# Patient Record
Sex: FEMALE | Race: WHITE | Employment: FULL TIME | URBAN - NONMETROPOLITAN AREA
[De-identification: names, ages, dates, MRNs, and addresses within clinical notes are randomized per-mention and may not be internally consistent; named-entity substitution may affect disease eponyms.]

---

## 2017-01-03 RX ORDER — DAPAGLIFLOZIN 5 MG/1
TABLET, FILM COATED ORAL
Qty: 30 TABLET | Refills: 11 | Status: SHIPPED | OUTPATIENT
Start: 2017-01-03 | End: 2018-01-11 | Stop reason: SDUPTHER

## 2017-04-04 DIAGNOSIS — R39.15 URINARY URGENCY: ICD-10-CM

## 2017-04-04 DIAGNOSIS — E11.9 TYPE 2 DIABETES MELLITUS WITHOUT COMPLICATION, WITHOUT LONG-TERM CURRENT USE OF INSULIN (HCC): ICD-10-CM

## 2017-04-04 DIAGNOSIS — J41.1 BRONCHITIS, MUCOPURULENT RECURRENT (HCC): ICD-10-CM

## 2017-04-04 DIAGNOSIS — J30.9 ALLERGIC RHINITIS: ICD-10-CM

## 2017-04-04 DIAGNOSIS — E11.9 CONTROLLED TYPE 2 DIABETES MELLITUS WITHOUT COMPLICATION, WITHOUT LONG-TERM CURRENT USE OF INSULIN (HCC): ICD-10-CM

## 2017-04-04 DIAGNOSIS — E78.5 DYSLIPIDEMIA: ICD-10-CM

## 2017-04-04 DIAGNOSIS — F32.A ANXIETY AND DEPRESSION: ICD-10-CM

## 2017-04-04 DIAGNOSIS — F41.9 ANXIETY AND DEPRESSION: ICD-10-CM

## 2017-04-04 RX ORDER — VENLAFAXINE HYDROCHLORIDE 75 MG/1
75 CAPSULE, EXTENDED RELEASE ORAL DAILY
Qty: 30 CAPSULE | Refills: 5 | Status: SHIPPED | OUTPATIENT
Start: 2017-04-04 | End: 2017-06-02 | Stop reason: SDUPTHER

## 2017-04-04 RX ORDER — ATORVASTATIN CALCIUM 20 MG/1
20 TABLET, FILM COATED ORAL DAILY
Qty: 30 TABLET | Refills: 5 | Status: SHIPPED | OUTPATIENT
Start: 2017-04-04 | End: 2017-10-31 | Stop reason: SDUPTHER

## 2017-04-04 RX ORDER — BENZOYL PEROXIDE
KIT TOPICAL
Qty: 30 TABLET | Refills: 5 | Status: SHIPPED | OUTPATIENT
Start: 2017-04-04 | End: 2017-09-21 | Stop reason: SDUPTHER

## 2017-04-04 RX ORDER — LANCETS 30 GAUGE
EACH MISCELLANEOUS
Qty: 100 EACH | Refills: 3 | Status: SHIPPED | OUTPATIENT
Start: 2017-04-04 | End: 2021-05-07 | Stop reason: SDUPTHER

## 2017-04-04 RX ORDER — OXYBUTYNIN CHLORIDE 5 MG/1
5 TABLET ORAL 2 TIMES DAILY
Qty: 60 TABLET | Refills: 11 | Status: SHIPPED | OUTPATIENT
Start: 2017-04-04 | End: 2018-04-16 | Stop reason: SDUPTHER

## 2017-04-04 RX ORDER — FLUTICASONE PROPIONATE 50 MCG
1 SPRAY, SUSPENSION (ML) NASAL DAILY
Qty: 1 BOTTLE | Refills: 5 | Status: SHIPPED | OUTPATIENT
Start: 2017-04-04 | End: 2018-06-12

## 2017-04-04 RX ORDER — ALBUTEROL SULFATE 90 UG/1
2 AEROSOL, METERED RESPIRATORY (INHALATION) EVERY 6 HOURS PRN
Qty: 1 INHALER | Refills: 3 | Status: CANCELLED | OUTPATIENT
Start: 2017-04-04

## 2017-04-04 RX ORDER — LISINOPRIL 2.5 MG/1
2.5 TABLET ORAL DAILY
Qty: 30 TABLET | Refills: 5 | Status: SHIPPED | OUTPATIENT
Start: 2017-04-04 | End: 2017-10-31 | Stop reason: SDUPTHER

## 2017-05-03 DIAGNOSIS — F41.9 ANXIETY AND DEPRESSION: ICD-10-CM

## 2017-05-03 DIAGNOSIS — F32.A ANXIETY AND DEPRESSION: ICD-10-CM

## 2017-05-04 RX ORDER — HYDROXYZINE 50 MG/1
TABLET, FILM COATED ORAL
Qty: 60 TABLET | Refills: 0 | Status: SHIPPED | OUTPATIENT
Start: 2017-05-04 | End: 2017-06-01 | Stop reason: SDUPTHER

## 2017-05-08 DIAGNOSIS — E11.9 TYPE 2 DIABETES MELLITUS WITHOUT COMPLICATION (HCC): ICD-10-CM

## 2017-05-26 ENCOUNTER — HOSPITAL ENCOUNTER (OUTPATIENT)
Age: 32
Discharge: HOME | End: 2017-05-26
Payer: COMMERCIAL

## 2017-05-26 DIAGNOSIS — E78.5 DYSLIPIDEMIA: ICD-10-CM

## 2017-05-26 DIAGNOSIS — E11.9 CONTROLLED TYPE 2 DIABETES MELLITUS WITHOUT COMPLICATION, WITHOUT LONG-TERM CURRENT USE OF INSULIN (HCC): ICD-10-CM

## 2017-05-26 LAB
ALBUMIN SERPL-MCNC: 4.2 G/DL (ref 3.5–5.2)
ALBUMIN/GLOBULIN RATIO: 1.3 (ref 1–2.5)
ALP BLD-CCNC: 89 U/L (ref 35–104)
ALT SERPL-CCNC: 24 U/L (ref 5–33)
ANION GAP SERPL CALCULATED.3IONS-SCNC: 14 MMOL/L (ref 9–17)
AST SERPL-CCNC: 13 U/L
BILIRUB SERPL-MCNC: 0.35 MG/DL (ref 0.3–1.2)
BUN BLDV-MCNC: 14 MG/DL (ref 6–20)
BUN/CREAT BLD: 26 (ref 9–20)
CALCIUM SERPL-MCNC: 9.3 MG/DL (ref 8.6–10.4)
CHLORIDE BLD-SCNC: 100 MMOL/L (ref 98–107)
CHOLESTEROL/HDL RATIO: 3.4
CHOLESTEROL: 144 MG/DL
CO2: 22 MMOL/L (ref 20–31)
CREAT SERPL-MCNC: 0.54 MG/DL (ref 0.5–0.9)
CREATININE URINE: 45.7 MG/DL (ref 28–217)
ESTIMATED AVERAGE GLUCOSE: 148 MG/DL
GFR AFRICAN AMERICAN: >60 ML/MIN
GFR NON-AFRICAN AMERICAN: >60 ML/MIN
GFR SERPL CREATININE-BSD FRML MDRD: ABNORMAL ML/MIN/{1.73_M2}
GFR SERPL CREATININE-BSD FRML MDRD: ABNORMAL ML/MIN/{1.73_M2}
GLUCOSE BLD-MCNC: 150 MG/DL (ref 70–99)
HBA1C MFR BLD: 6.8 % (ref 4.8–5.9)
HDLC SERPL-MCNC: 42 MG/DL
LDL CHOLESTEROL: 75 MG/DL (ref 0–130)
MICROALBUMIN/CREAT 24H UR: <12 MG/L
MICROALBUMIN/CREAT UR-RTO: 26 MCG/MG CREAT
POTASSIUM SERPL-SCNC: 4.5 MMOL/L (ref 3.7–5.3)
SODIUM BLD-SCNC: 136 MMOL/L (ref 135–144)
TOTAL PROTEIN: 7.4 G/DL (ref 6.4–8.3)
TRIGL SERPL-MCNC: 133 MG/DL
VLDLC SERPL CALC-MCNC: NORMAL MG/DL (ref 1–30)

## 2017-05-26 PROCEDURE — 83036 HEMOGLOBIN GLYCOSYLATED A1C: CPT

## 2017-05-26 PROCEDURE — 36415 COLL VENOUS BLD VENIPUNCTURE: CPT

## 2017-05-26 PROCEDURE — 80053 COMPREHEN METABOLIC PANEL: CPT

## 2017-05-26 PROCEDURE — 82570 ASSAY OF URINE CREATININE: CPT

## 2017-05-26 PROCEDURE — 82043 UR ALBUMIN QUANTITATIVE: CPT

## 2017-05-26 PROCEDURE — 80061 LIPID PANEL: CPT

## 2017-06-01 DIAGNOSIS — F32.A ANXIETY AND DEPRESSION: ICD-10-CM

## 2017-06-01 DIAGNOSIS — F41.9 ANXIETY AND DEPRESSION: ICD-10-CM

## 2017-06-02 ENCOUNTER — OFFICE VISIT (OUTPATIENT)
Dept: PRIMARY CARE CLINIC | Age: 32
End: 2017-06-02
Payer: COMMERCIAL

## 2017-06-02 VITALS
RESPIRATION RATE: 18 BRPM | HEIGHT: 66 IN | DIASTOLIC BLOOD PRESSURE: 63 MMHG | SYSTOLIC BLOOD PRESSURE: 98 MMHG | BODY MASS INDEX: 37.96 KG/M2 | WEIGHT: 236.2 LBS | HEART RATE: 69 BPM | TEMPERATURE: 97 F

## 2017-06-02 DIAGNOSIS — F41.9 ANXIETY AND DEPRESSION: ICD-10-CM

## 2017-06-02 DIAGNOSIS — Z23 NEED FOR PNEUMOCOCCAL VACCINATION: ICD-10-CM

## 2017-06-02 DIAGNOSIS — F32.A ANXIETY AND DEPRESSION: ICD-10-CM

## 2017-06-02 DIAGNOSIS — E78.5 DYSLIPIDEMIA: ICD-10-CM

## 2017-06-02 DIAGNOSIS — E11.9 CONTROLLED TYPE 2 DIABETES MELLITUS WITHOUT COMPLICATION, WITHOUT LONG-TERM CURRENT USE OF INSULIN (HCC): Primary | ICD-10-CM

## 2017-06-02 PROCEDURE — 90471 IMMUNIZATION ADMIN: CPT | Performed by: NURSE PRACTITIONER

## 2017-06-02 PROCEDURE — 90732 PPSV23 VACC 2 YRS+ SUBQ/IM: CPT | Performed by: NURSE PRACTITIONER

## 2017-06-02 PROCEDURE — 99214 OFFICE O/P EST MOD 30 MIN: CPT | Performed by: NURSE PRACTITIONER

## 2017-06-02 RX ORDER — HYDROXYZINE 50 MG/1
TABLET, FILM COATED ORAL
Qty: 30 TABLET | Refills: 1 | Status: SHIPPED | OUTPATIENT
Start: 2017-06-02 | End: 2017-07-17 | Stop reason: SDUPTHER

## 2017-06-02 RX ORDER — VENLAFAXINE HYDROCHLORIDE 150 MG/1
150 CAPSULE, EXTENDED RELEASE ORAL DAILY
Qty: 90 CAPSULE | Refills: 1 | Status: SHIPPED | OUTPATIENT
Start: 2017-06-02 | End: 2017-11-29 | Stop reason: SDUPTHER

## 2017-06-02 ASSESSMENT — ENCOUNTER SYMPTOMS
BACK PAIN: 0
SORE THROAT: 0
ABDOMINAL PAIN: 0
DIARRHEA: 0
VOMITING: 0
RHINORRHEA: 0
NAUSEA: 0
SHORTNESS OF BREATH: 0
COUGH: 0
WHEEZING: 0
CONSTIPATION: 0

## 2017-07-17 DIAGNOSIS — E11.9 TYPE 2 DIABETES MELLITUS WITHOUT COMPLICATION (HCC): ICD-10-CM

## 2017-07-17 DIAGNOSIS — F32.A ANXIETY AND DEPRESSION: ICD-10-CM

## 2017-07-17 DIAGNOSIS — F41.9 ANXIETY AND DEPRESSION: ICD-10-CM

## 2017-07-18 DIAGNOSIS — E11.9 TYPE 2 DIABETES MELLITUS WITHOUT COMPLICATION (HCC): ICD-10-CM

## 2017-07-18 DIAGNOSIS — E11.9 TYPE 2 DIABETES MELLITUS WITHOUT COMPLICATION, WITHOUT LONG-TERM CURRENT USE OF INSULIN (HCC): ICD-10-CM

## 2017-07-18 DIAGNOSIS — E78.5 DYSLIPIDEMIA: ICD-10-CM

## 2017-07-18 DIAGNOSIS — J30.9 ALLERGIC RHINITIS: ICD-10-CM

## 2017-07-18 DIAGNOSIS — E11.9 CONTROLLED TYPE 2 DIABETES MELLITUS WITHOUT COMPLICATION, WITHOUT LONG-TERM CURRENT USE OF INSULIN (HCC): ICD-10-CM

## 2017-07-18 DIAGNOSIS — J41.1 BRONCHITIS, MUCOPURULENT RECURRENT (HCC): ICD-10-CM

## 2017-07-18 DIAGNOSIS — F41.9 ANXIETY AND DEPRESSION: ICD-10-CM

## 2017-07-18 DIAGNOSIS — F32.A ANXIETY AND DEPRESSION: ICD-10-CM

## 2017-07-18 RX ORDER — LISINOPRIL 2.5 MG/1
2.5 TABLET ORAL DAILY
Qty: 30 TABLET | Refills: 5 | Status: CANCELLED | OUTPATIENT
Start: 2017-07-18

## 2017-07-18 RX ORDER — LORATADINE 10 MG/1
TABLET ORAL
Qty: 30 TABLET | Refills: 5 | Status: CANCELLED | OUTPATIENT
Start: 2017-07-18

## 2017-07-18 RX ORDER — HYDROXYZINE 50 MG/1
TABLET, FILM COATED ORAL
Qty: 30 TABLET | Refills: 1 | Status: SHIPPED | OUTPATIENT
Start: 2017-07-18 | End: 2017-09-10 | Stop reason: SDUPTHER

## 2017-07-18 RX ORDER — ALBUTEROL SULFATE 90 UG/1
2 AEROSOL, METERED RESPIRATORY (INHALATION) EVERY 6 HOURS PRN
Qty: 1 INHALER | Refills: 3 | Status: SHIPPED | OUTPATIENT
Start: 2017-07-18 | End: 2017-10-31 | Stop reason: SDUPTHER

## 2017-07-18 RX ORDER — HYDROXYZINE 50 MG/1
TABLET, FILM COATED ORAL
Qty: 30 TABLET | Refills: 1 | Status: CANCELLED | OUTPATIENT
Start: 2017-07-18

## 2017-07-18 RX ORDER — FLUTICASONE PROPIONATE 50 MCG
1 SPRAY, SUSPENSION (ML) NASAL DAILY
Qty: 1 BOTTLE | Refills: 5 | Status: CANCELLED | OUTPATIENT
Start: 2017-07-18

## 2017-07-18 RX ORDER — BLOOD-GLUCOSE METER
1 KIT MISCELLANEOUS DAILY
Qty: 1 KIT | Refills: 0 | Status: SHIPPED | OUTPATIENT
Start: 2017-07-18 | End: 2018-08-10 | Stop reason: SDUPTHER

## 2017-07-18 RX ORDER — VENLAFAXINE HYDROCHLORIDE 150 MG/1
150 CAPSULE, EXTENDED RELEASE ORAL DAILY
Qty: 90 CAPSULE | Refills: 1 | Status: CANCELLED | OUTPATIENT
Start: 2017-07-18

## 2017-07-18 RX ORDER — ATORVASTATIN CALCIUM 20 MG/1
20 TABLET, FILM COATED ORAL DAILY
Qty: 30 TABLET | Refills: 5 | Status: CANCELLED | OUTPATIENT
Start: 2017-07-18

## 2017-07-18 RX ORDER — LINAGLIPTIN AND METFORMIN HYDROCHLORIDE 2.5; 1 MG/1; MG/1
TABLET, FILM COATED ORAL
Qty: 60 TABLET | Refills: 11 | Status: SHIPPED | OUTPATIENT
Start: 2017-07-18 | End: 2018-08-10 | Stop reason: SDUPTHER

## 2017-09-09 ENCOUNTER — APPOINTMENT (OUTPATIENT)
Dept: GENERAL RADIOLOGY | Age: 32
End: 2017-09-09
Payer: COMMERCIAL

## 2017-09-09 ENCOUNTER — HOSPITAL ENCOUNTER (EMERGENCY)
Age: 32
Discharge: HOME | End: 2017-09-09
Attending: EMERGENCY MEDICINE
Payer: COMMERCIAL

## 2017-09-09 VITALS
TEMPERATURE: 98.1 F | SYSTOLIC BLOOD PRESSURE: 121 MMHG | HEIGHT: 66 IN | WEIGHT: 220 LBS | BODY MASS INDEX: 35.36 KG/M2 | HEART RATE: 68 BPM | RESPIRATION RATE: 18 BRPM | OXYGEN SATURATION: 96 % | DIASTOLIC BLOOD PRESSURE: 85 MMHG

## 2017-09-09 DIAGNOSIS — S80.01XA CONTUSION OF RIGHT KNEE, INITIAL ENCOUNTER: Primary | ICD-10-CM

## 2017-09-09 PROCEDURE — 99283 EMERGENCY DEPT VISIT LOW MDM: CPT

## 2017-09-09 PROCEDURE — 73562 X-RAY EXAM OF KNEE 3: CPT

## 2017-09-09 ASSESSMENT — PAIN DESCRIPTION - PAIN TYPE: TYPE: ACUTE PAIN

## 2017-09-09 ASSESSMENT — PAIN SCALES - GENERAL
PAINLEVEL_OUTOF10: 9
PAINLEVEL_OUTOF10: 4

## 2017-09-10 DIAGNOSIS — F41.9 ANXIETY AND DEPRESSION: ICD-10-CM

## 2017-09-10 DIAGNOSIS — F32.A ANXIETY AND DEPRESSION: ICD-10-CM

## 2017-09-11 RX ORDER — HYDROXYZINE 50 MG/1
TABLET, FILM COATED ORAL
Qty: 180 TABLET | Refills: 1 | Status: SHIPPED | OUTPATIENT
Start: 2017-09-11 | End: 2018-04-15 | Stop reason: SDUPTHER

## 2017-09-21 DIAGNOSIS — J30.9 ALLERGIC RHINITIS: ICD-10-CM

## 2017-09-21 RX ORDER — BENZOYL PEROXIDE
KIT TOPICAL
Qty: 30 TABLET | Refills: 5 | Status: SHIPPED | OUTPATIENT
Start: 2017-09-21 | End: 2018-05-14 | Stop reason: SDUPTHER

## 2017-09-26 ENCOUNTER — PATIENT MESSAGE (OUTPATIENT)
Dept: PRIMARY CARE CLINIC | Age: 32
End: 2017-09-26

## 2017-09-26 DIAGNOSIS — N89.8 VAGINAL ITCHING: ICD-10-CM

## 2017-10-09 ENCOUNTER — PATIENT MESSAGE (OUTPATIENT)
Dept: PRIMARY CARE CLINIC | Age: 32
End: 2017-10-09

## 2017-10-09 NOTE — TELEPHONE ENCOUNTER
From: Najma Close  To:  Hemanth Johnson CNP  Sent: 10/9/2017 8:48 AM EDT  Subject: Non-Urgent Medical Question    My work needs a note saying I am diabetic and need to take a break to eat during my day

## 2017-10-31 ENCOUNTER — OFFICE VISIT (OUTPATIENT)
Dept: PRIMARY CARE CLINIC | Age: 32
End: 2017-10-31
Payer: COMMERCIAL

## 2017-10-31 VITALS
TEMPERATURE: 97.6 F | WEIGHT: 226.3 LBS | BODY MASS INDEX: 36.53 KG/M2 | RESPIRATION RATE: 16 BRPM | DIASTOLIC BLOOD PRESSURE: 89 MMHG | HEART RATE: 89 BPM | SYSTOLIC BLOOD PRESSURE: 113 MMHG

## 2017-10-31 DIAGNOSIS — J40 BRONCHITIS: Primary | ICD-10-CM

## 2017-10-31 DIAGNOSIS — E78.5 DYSLIPIDEMIA: ICD-10-CM

## 2017-10-31 DIAGNOSIS — J20.9 ACUTE BRONCHITIS WITH BRONCHOSPASM: ICD-10-CM

## 2017-10-31 DIAGNOSIS — E11.9 CONTROLLED TYPE 2 DIABETES MELLITUS WITHOUT COMPLICATION, WITHOUT LONG-TERM CURRENT USE OF INSULIN (HCC): ICD-10-CM

## 2017-10-31 PROCEDURE — G8484 FLU IMMUNIZE NO ADMIN: HCPCS | Performed by: NURSE PRACTITIONER

## 2017-10-31 PROCEDURE — 4004F PT TOBACCO SCREEN RCVD TLK: CPT | Performed by: NURSE PRACTITIONER

## 2017-10-31 PROCEDURE — G8427 DOCREV CUR MEDS BY ELIG CLIN: HCPCS | Performed by: NURSE PRACTITIONER

## 2017-10-31 PROCEDURE — G8417 CALC BMI ABV UP PARAM F/U: HCPCS | Performed by: NURSE PRACTITIONER

## 2017-10-31 PROCEDURE — 99213 OFFICE O/P EST LOW 20 MIN: CPT | Performed by: NURSE PRACTITIONER

## 2017-10-31 RX ORDER — AZITHROMYCIN 250 MG/1
TABLET, FILM COATED ORAL
Qty: 1 PACKET | Refills: 0 | Status: SHIPPED | OUTPATIENT
Start: 2017-10-31 | End: 2017-11-10

## 2017-10-31 RX ORDER — BENZONATATE 100 MG/1
100 CAPSULE ORAL 3 TIMES DAILY PRN
Qty: 21 CAPSULE | Refills: 0 | Status: SHIPPED | OUTPATIENT
Start: 2017-10-31 | End: 2017-11-07

## 2017-10-31 RX ORDER — ALBUTEROL SULFATE 90 UG/1
2 AEROSOL, METERED RESPIRATORY (INHALATION) EVERY 6 HOURS PRN
Qty: 1 INHALER | Refills: 3 | Status: SHIPPED | OUTPATIENT
Start: 2017-10-31 | End: 2019-05-14 | Stop reason: SDUPTHER

## 2017-10-31 ASSESSMENT — ENCOUNTER SYMPTOMS
WHEEZING: 1
GASTROINTESTINAL NEGATIVE: 1
SINUS PAIN: 1
COUGH: 1
EYES NEGATIVE: 1
RHINORRHEA: 1
CHEST TIGHTNESS: 1
SORE THROAT: 1
SHORTNESS OF BREATH: 1
SINUS PRESSURE: 1
TROUBLE SWALLOWING: 0
VOICE CHANGE: 1

## 2017-10-31 NOTE — PATIENT INSTRUCTIONS
Patient Education        Bronchitis: Care Instructions  Your Care Instructions    Bronchitis is inflammation of the bronchial tubes, which carry air to the lungs. The tubes swell and produce mucus, or phlegm. The mucus and inflamed bronchial tubes make you cough. You may have trouble breathing. Most cases of bronchitis are caused by viruses like those that cause colds. Antibiotics usually do not help and they may be harmful. Bronchitis usually develops rapidly and lasts about 2 to 3 weeks in otherwise healthy people. Follow-up care is a key part of your treatment and safety. Be sure to make and go to all appointments, and call your doctor if you are having problems. It's also a good idea to know your test results and keep a list of the medicines you take. How can you care for yourself at home? · Take all medicines exactly as prescribed. Call your doctor if you think you are having a problem with your medicine. · Get some extra rest.  · Take an over-the-counter pain medicine, such as acetaminophen (Tylenol), ibuprofen (Advil, Motrin), or naproxen (Aleve) to reduce fever and relieve body aches. Read and follow all instructions on the label. · Do not take two or more pain medicines at the same time unless the doctor told you to. Many pain medicines have acetaminophen, which is Tylenol. Too much acetaminophen (Tylenol) can be harmful. · Take an over-the-counter cough medicine that contains dextromethorphan to help quiet a dry, hacking cough so that you can sleep. Avoid cough medicines that have more than one active ingredient. Read and follow all instructions on the label. · Breathe moist air from a humidifier, hot shower, or sink filled with hot water. The heat and moisture will thin mucus so you can cough it out. · Do not smoke. Smoking can make bronchitis worse. If you need help quitting, talk to your doctor about stop-smoking programs and medicines.  These can increase your chances of quitting for good.  When should you call for help? Call 911 anytime you think you may need emergency care. For example, call if:  · You have severe trouble breathing. Call your doctor now or seek immediate medical care if:  · You have new or worse trouble breathing. · You cough up dark brown or bloody mucus (sputum). · You have a new or higher fever. · You have a new rash. Watch closely for changes in your health, and be sure to contact your doctor if:  · You cough more deeply or more often, especially if you notice more mucus or a change in the color of your mucus. · You are not getting better as expected. Where can you learn more? Go to https://Revaluate.[employer]. org and sign in to your [employer] account. Enter H333 in the [employer] box to learn more about \"Bronchitis: Care Instructions. \"     If you do not have an account, please click on the \"Sign Up Now\" link. Current as of: March 25, 2017  Content Version: 11.3  © 8888-4623 [employer]. Care instructions adapted under license by [facility] ([facility]). If you have questions about a medical condition or this instruction, always ask your healthcare professional. [place] [address] any warranty or liability for your use of this information.

## 2017-10-31 NOTE — LETTER
[address] [address]  Phone: [phone]  Fax: [phone] Renate Guzman NP        October 31, 2017     Patient: Srinivasan Real   YOB: 1985   Date of Visit: 10/31/2017       To Whom it May Concern:    Elliott Tapia was seen in my clinic on 10/31/2017. She may return to work on 11/1/2017. If you have any questions or concerns, please don't hesitate to call.     Sincerely,         Ashley Gimenez NP

## 2017-10-31 NOTE — PROGRESS NOTES
[facility] PHYSICIANS  Formerly [facility]  [address]  [place] [address]  Dept: [phone]  Dept Fax: [phone]    Alma Okeefe is a 29 y.o. female who presents to the [facility] in Care today for her medical conditions/complaints as noted below. Alma Okeefe is c/o of Cough (started a week ago.  ) and Congestion (sinus)      HPI:     Cough   This is a new problem. The current episode started 1 to 4 weeks ago (over a week, worse over past 3-4 days ). The problem has been gradually worsening. Episode frequency: frequent  Cough characteristics: dry and productive yellow/green  Associated symptoms include chills, a fever, headaches, nasal congestion, rhinorrhea, a sore throat, shortness of breath and wheezing. Pertinent negatives include no ear congestion, ear pain or rash. Nothing aggravates the symptoms. Risk factors for lung disease include smoking/tobacco exposure. She has tried OTC cough suppressant and steroid inhaler for the symptoms. The treatment provided no relief. Her past medical history is significant for asthma.        Past Medical History:   Diagnosis Date    Depression     Female stress incontinence 11/23/2015    Obesity     Type II or unspecified type diabetes mellitus without mention of complication, not stated as uncontrolled     Wears glasses         Current Outpatient Prescriptions   Medication Sig Dispense Refill    EQ ALLERGY RELIEF 10 MG tablet TAKE ONE TABLET BY MOUTH ONCE DAILY 30 tablet 5    hydrOXYzine (ATARAX) 50 MG tablet TAKE ONE TABLET BY MOUTH TWICE DAILY AS NEEDED FOR ANXIETY 180 tablet 1    JENTADUETO 2.5-1000 MG TABS TAKE ONE TABLET BY MOUTH TWICE DAILY 60 tablet 11    albuterol sulfate HFA (VENTOLIN HFA) 108 (90 Base) MCG/ACT inhaler Inhale 2 puffs into the lungs every 6 hours as needed for Wheezing 1 Inhaler 3    glucose monitoring kit (FREESTYLE) monitoring kit 1 kit by Does not apply route daily Substitute as needed for Right Ear: Tympanic membrane, external ear and ear canal normal.   Left Ear: Tympanic membrane, external ear and ear canal normal.   Nose: Mucosal edema and rhinorrhea present. Right sinus exhibits no maxillary sinus tenderness and no frontal sinus tenderness. Left sinus exhibits no maxillary sinus tenderness and no frontal sinus tenderness. Mouth/Throat: Uvula is midline, oropharynx is clear and moist and mucous membranes are normal. No trismus in the jaw. No uvula swelling. No oropharyngeal exudate, posterior oropharyngeal edema, posterior oropharyngeal erythema or tonsillar abscesses. Eyes: Conjunctivae and EOM are normal. Pupils are equal, round, and reactive to light. Right eye exhibits no discharge. Left eye exhibits no discharge. Neck: Normal range of motion. Neck supple. No tracheal deviation present. No thyromegaly present. Cardiovascular: Normal rate, regular rhythm, normal heart sounds and intact distal pulses. Exam reveals no gallop and no friction rub. No murmur heard. Pulses:       Radial pulses are 2+ on the left side. Pulmonary/Chest: Effort normal. No accessory muscle usage. No tachypnea. No respiratory distress. She has no decreased breath sounds. She has wheezes. She has no rhonchi. She has no rales. Dry cough in office   Breath sounds with scattered wheezes to auscultation over posterior bilateral fields. Chest expansion is symmetrical with non-restricted air movement. Musculoskeletal: Normal range of motion. She exhibits no edema or tenderness. Lymphadenopathy:     She has no cervical adenopathy. Neurological: She is alert and oriented to person, place, and time. No cranial nerve deficit. She exhibits normal muscle tone. Coordination and gait normal.   Skin: Skin is warm and dry. No rash noted. She is not diaphoretic. No erythema. Psychiatric: She has a normal mood and affect.  Her speech is normal and behavior is normal. Judgment and thought content normal.

## 2017-11-01 RX ORDER — LISINOPRIL 2.5 MG/1
TABLET ORAL
Qty: 90 TABLET | Refills: 1 | Status: SHIPPED | OUTPATIENT
Start: 2017-11-01 | End: 2018-05-14 | Stop reason: SDUPTHER

## 2017-11-01 RX ORDER — ATORVASTATIN CALCIUM 20 MG/1
TABLET, FILM COATED ORAL
Qty: 90 TABLET | Refills: 1 | Status: SHIPPED | OUTPATIENT
Start: 2017-11-01 | End: 2018-08-07 | Stop reason: SDUPTHER

## 2017-11-01 NOTE — TELEPHONE ENCOUNTER
Health Maintenance   Topic Date Due    Diabetic retinal exam  04/29/2017    Diabetic foot exam  08/03/2017    Flu vaccine (1) 09/01/2017    DTaP/Tdap/Td vaccine (1 - Tdap) 11/03/2017 (Originally 9/2/2006)    Diabetic hemoglobin A1C test  05/26/2018    Diabetic microalbuminuria test  05/26/2018    Lipid screen  05/26/2018    Cervical cancer screen  08/19/2018    Pneumococcal med risk  Completed    HIV screen  Completed             (applicable per patient's age: Cancer Screenings, Depression Screening, Fall Risk Screening, Immunizations)    Hemoglobin A1C (%)   Date Value   05/26/2017 6.8 (H)   10/28/2016 6.2 (H)   07/29/2016 6.1 (H)     Microalb/Crt.  Ratio (mcg/mg creat)   Date Value   05/26/2017 26 (H)     LDL Cholesterol (mg/dL)   Date Value   05/26/2017 75     AST (U/L)   Date Value   05/26/2017 13     ALT (U/L)   Date Value   05/26/2017 24     BUN (mg/dL)   Date Value   05/26/2017 14      (goal A1C is < 7)   (goal LDL is <100) need 30-50% reduction from baseline     BP Readings from Last 3 Encounters:   10/31/17 113/89   09/09/17 121/85   06/02/17 98/63    (goal /80)      All Future Testing planned in [facility]:  Lab Frequency Next Occurrence   Comprehensive Metabolic Panel Once 31/43/4898   Hemoglobin A1C Once 11/29/2017   Microalbumin, Ur Once 11/29/2017   CBC Auto Differential Once 11/29/2017       Next Visit Date:  Future Appointments  Date Time Provider Rohini Cabral   12/11/2017 9:30 AM [facility] Might, CNP [facility]            Patient Active Problem List:     Dyslipidemia     Insomnia     Anxiety and depression     Morbidly obese (HCC) BMI 38.1     Adopted     Bronchitis, mucopurulent recurrent (HCC)     Urinary urgency     Female stress incontinence     Controlled type 2 diabetes mellitus without complication, without long-term current use of insulin ([address]

## 2017-11-29 DIAGNOSIS — F32.A ANXIETY AND DEPRESSION: ICD-10-CM

## 2017-11-29 DIAGNOSIS — F41.9 ANXIETY AND DEPRESSION: ICD-10-CM

## 2017-11-29 RX ORDER — VENLAFAXINE HYDROCHLORIDE 150 MG/1
CAPSULE, EXTENDED RELEASE ORAL
Qty: 90 CAPSULE | Refills: 1 | Status: SHIPPED | OUTPATIENT
Start: 2017-11-29 | End: 2018-08-07 | Stop reason: SDUPTHER

## 2018-01-12 RX ORDER — DAPAGLIFLOZIN 5 MG/1
TABLET, FILM COATED ORAL
Qty: 90 TABLET | Refills: 3 | Status: SHIPPED | OUTPATIENT
Start: 2018-01-12 | End: 2019-05-14

## 2018-01-12 NOTE — TELEPHONE ENCOUNTER
Health Maintenance   Topic Date Due    DTaP/Tdap/Td vaccine (1 - Tdap) 09/02/2006    Diabetic retinal exam  04/29/2017    Diabetic foot exam  08/03/2017    Flu vaccine (1) 09/01/2017    A1C test (Diabetic or Prediabetic)  05/26/2018    Diabetic microalbuminuria test  05/26/2018    Lipid screen  05/26/2018    Potassium monitoring  05/26/2018    Creatinine monitoring  05/26/2018    Cervical cancer screen  08/19/2018    Pneumococcal med risk  Completed    HIV screen  Completed             (applicable per patient's age: Cancer Screenings, Depression Screening, Fall Risk Screening, Immunizations)    Hemoglobin A1C (%)   Date Value   05/26/2017 6.8 (H)   10/28/2016 6.2 (H)   07/29/2016 6.1 (H)     Microalb/Crt. Ratio (mcg/mg creat)   Date Value   05/26/2017 26 (H)     LDL Cholesterol (mg/dL)   Date Value   05/26/2017 75     AST (U/L)   Date Value   05/26/2017 13     ALT (U/L)   Date Value   05/26/2017 24     BUN (mg/dL)   Date Value   05/26/2017 14      (goal A1C is < 7)   (goal LDL is <100) need 30-50% reduction from baseline     BP Readings from Last 3 Encounters:   10/31/17 113/89   09/09/17 121/85   06/02/17 98/63    (goal /80)      All Future Testing planned in [facility]:  Lab Frequency Next Occurrence   Comprehensive Metabolic Panel Once 73/26/3280   Hemoglobin A1C Once 01/19/2018   Microalbumin, Ur Once 01/19/2018   CBC Auto Differential Once 01/19/2018       Next Visit Date:  No future appointments.          Patient Active Problem List:     Dyslipidemia     Insomnia     Anxiety and depression     Morbidly obese (HCC) BMI 38.1     Adopted     Bronchitis, mucopurulent recurrent (HCC)     Urinary urgency     Female stress incontinence     Controlled type 2 diabetes mellitus without complication, without long-term current use of insulin ([facility] [address]

## 2018-03-08 ENCOUNTER — TELEPHONE (OUTPATIENT)
Dept: PRIMARY CARE CLINIC | Age: 33
End: 2018-03-08

## 2018-03-08 NOTE — TELEPHONE ENCOUNTER
Attempted to call patient and message left regarding need to have lab work done that was previously ordered and to R/S missed appt. Instructed to call the office.

## 2018-04-16 DIAGNOSIS — R39.15 URINARY URGENCY: ICD-10-CM

## 2018-04-16 RX ORDER — OXYBUTYNIN CHLORIDE 5 MG/1
5 TABLET ORAL 2 TIMES DAILY
Qty: 60 TABLET | Refills: 0 | Status: SHIPPED | OUTPATIENT
Start: 2018-04-16 | End: 2018-07-17 | Stop reason: ALTCHOICE

## 2018-05-07 ENCOUNTER — OFFICE VISIT (OUTPATIENT)
Dept: PRIMARY CARE CLINIC | Age: 33
End: 2018-05-07
Payer: COMMERCIAL

## 2018-05-07 VITALS
DIASTOLIC BLOOD PRESSURE: 73 MMHG | BODY MASS INDEX: 36.41 KG/M2 | WEIGHT: 225.6 LBS | TEMPERATURE: 96.8 F | RESPIRATION RATE: 16 BRPM | HEART RATE: 73 BPM | SYSTOLIC BLOOD PRESSURE: 102 MMHG

## 2018-05-07 DIAGNOSIS — K52.9 AGE (ACUTE GASTROENTERITIS): Primary | ICD-10-CM

## 2018-05-07 PROCEDURE — 4004F PT TOBACCO SCREEN RCVD TLK: CPT | Performed by: NURSE PRACTITIONER

## 2018-05-07 PROCEDURE — G8427 DOCREV CUR MEDS BY ELIG CLIN: HCPCS | Performed by: NURSE PRACTITIONER

## 2018-05-07 PROCEDURE — 99213 OFFICE O/P EST LOW 20 MIN: CPT | Performed by: NURSE PRACTITIONER

## 2018-05-07 PROCEDURE — G8417 CALC BMI ABV UP PARAM F/U: HCPCS | Performed by: NURSE PRACTITIONER

## 2018-05-07 RX ORDER — PROMETHAZINE HYDROCHLORIDE 25 MG/1
25 TABLET ORAL EVERY 6 HOURS PRN
Qty: 12 TABLET | Refills: 0 | Status: SHIPPED | OUTPATIENT
Start: 2018-05-07 | End: 2018-08-09 | Stop reason: SDUPTHER

## 2018-05-07 ASSESSMENT — ENCOUNTER SYMPTOMS
RHINORRHEA: 0
SORE THROAT: 0
COUGH: 0
VOMITING: 1
SHORTNESS OF BREATH: 0
SINUS PAIN: 0
FACIAL SWELLING: 0
DIARRHEA: 0
BLOOD IN STOOL: 0
ABDOMINAL PAIN: 0
NAUSEA: 1
WHEEZING: 0
CONSTIPATION: 0

## 2018-05-14 DIAGNOSIS — F41.9 ANXIETY AND DEPRESSION: ICD-10-CM

## 2018-05-14 DIAGNOSIS — F32.A ANXIETY AND DEPRESSION: ICD-10-CM

## 2018-05-14 RX ORDER — HYDROXYZINE 50 MG/1
TABLET, FILM COATED ORAL
Qty: 60 TABLET | Refills: 0 | OUTPATIENT
Start: 2018-05-14

## 2018-05-15 RX ORDER — HYDROXYZINE 50 MG/1
TABLET, FILM COATED ORAL
Qty: 60 TABLET | Refills: 2 | Status: SHIPPED | OUTPATIENT
Start: 2018-05-15 | End: 2018-08-07 | Stop reason: SDUPTHER

## 2018-06-04 ENCOUNTER — TELEPHONE (OUTPATIENT)
Dept: PRIMARY CARE CLINIC | Age: 33
End: 2018-06-04

## 2018-06-04 DIAGNOSIS — E11.9 CONTROLLED TYPE 2 DIABETES MELLITUS WITHOUT COMPLICATION, WITHOUT LONG-TERM CURRENT USE OF INSULIN (HCC): Primary | ICD-10-CM

## 2018-06-06 ENCOUNTER — HOSPITAL ENCOUNTER (OUTPATIENT)
Age: 33
Discharge: HOME | End: 2018-06-06
Payer: COMMERCIAL

## 2018-06-06 DIAGNOSIS — R30.0 DYSURIA: ICD-10-CM

## 2018-06-06 DIAGNOSIS — R30.0 DYSURIA: Primary | ICD-10-CM

## 2018-06-06 DIAGNOSIS — E11.9 CONTROLLED TYPE 2 DIABETES MELLITUS WITHOUT COMPLICATION, WITHOUT LONG-TERM CURRENT USE OF INSULIN (HCC): ICD-10-CM

## 2018-06-06 LAB
-: ABNORMAL
ABSOLUTE EOS #: 0.35 K/UL (ref 0–0.44)
ABSOLUTE IMMATURE GRANULOCYTE: 0.08 K/UL (ref 0–0.3)
ABSOLUTE LYMPH #: 3.04 K/UL (ref 1.1–3.7)
ABSOLUTE MONO #: 0.64 K/UL (ref 0.1–1.2)
ALBUMIN SERPL-MCNC: 4.5 G/DL (ref 3.5–5.2)
ALBUMIN/GLOBULIN RATIO: 1.3 (ref 1–2.5)
ALP BLD-CCNC: 104 U/L (ref 35–104)
ALT SERPL-CCNC: 16 U/L (ref 5–33)
AMORPHOUS: ABNORMAL
ANION GAP SERPL CALCULATED.3IONS-SCNC: 12 MMOL/L (ref 9–17)
AST SERPL-CCNC: 9 U/L
BACTERIA: ABNORMAL
BASOPHILS # BLD: 1 % (ref 0–2)
BASOPHILS ABSOLUTE: 0.09 K/UL (ref 0–0.2)
BILIRUB SERPL-MCNC: 0.21 MG/DL (ref 0.3–1.2)
BILIRUBIN URINE: NEGATIVE
BUN BLDV-MCNC: 13 MG/DL (ref 6–20)
BUN/CREAT BLD: 27 (ref 9–20)
CALCIUM SERPL-MCNC: 9.6 MG/DL (ref 8.6–10.4)
CASTS UA: ABNORMAL /LPF
CHLORIDE BLD-SCNC: 98 MMOL/L (ref 98–107)
CO2: 25 MMOL/L (ref 20–31)
COLOR: YELLOW
COMMENT UA: ABNORMAL
CREAT SERPL-MCNC: 0.49 MG/DL (ref 0.5–0.9)
CREATININE URINE: 53.9 MG/DL (ref 28–217)
CRYSTALS, UA: ABNORMAL /HPF
DIFFERENTIAL TYPE: ABNORMAL
EOSINOPHILS RELATIVE PERCENT: 3 % (ref 1–4)
EPITHELIAL CELLS UA: ABNORMAL /HPF (ref 0–25)
ESTIMATED AVERAGE GLUCOSE: 197 MG/DL
GFR AFRICAN AMERICAN: >60 ML/MIN
GFR NON-AFRICAN AMERICAN: >60 ML/MIN
GFR SERPL CREATININE-BSD FRML MDRD: ABNORMAL ML/MIN/{1.73_M2}
GFR SERPL CREATININE-BSD FRML MDRD: ABNORMAL ML/MIN/{1.73_M2}
GLUCOSE BLD-MCNC: 206 MG/DL (ref 70–99)
GLUCOSE URINE: ABNORMAL
HBA1C MFR BLD: 8.5 % (ref 4.8–5.9)
HCT VFR BLD CALC: 43.6 % (ref 36.3–47.1)
HEMOGLOBIN: 14.5 G/DL (ref 11.9–15.1)
IMMATURE GRANULOCYTES: 1 %
KETONES, URINE: NEGATIVE
LEUKOCYTE ESTERASE, URINE: NEGATIVE
LYMPHOCYTES # BLD: 23 % (ref 24–43)
MCH RBC QN AUTO: 29.8 PG (ref 25.2–33.5)
MCHC RBC AUTO-ENTMCNC: 33.3 G/DL (ref 28.4–34.8)
MCV RBC AUTO: 89.5 FL (ref 82.6–102.9)
MICROALBUMIN/CREAT 24H UR: 60 MG/L
MICROALBUMIN/CREAT UR-RTO: 111 MCG/MG CREAT
MONOCYTES # BLD: 5 % (ref 3–12)
MUCUS: ABNORMAL
NITRITE, URINE: NEGATIVE
NRBC AUTOMATED: 0 PER 100 WBC
OTHER OBSERVATIONS UA: ABNORMAL
PDW BLD-RTO: 12.3 % (ref 11.8–14.4)
PH UA: 6.5 (ref 5–9)
PLATELET # BLD: 444 K/UL (ref 138–453)
PLATELET ESTIMATE: ABNORMAL
PMV BLD AUTO: 8.8 FL (ref 8.1–13.5)
POTASSIUM SERPL-SCNC: 4.6 MMOL/L (ref 3.7–5.3)
PROTEIN UA: NEGATIVE
RBC # BLD: 4.87 M/UL (ref 3.95–5.11)
RBC # BLD: ABNORMAL 10*6/UL
RBC UA: ABNORMAL /HPF (ref 0–2)
RENAL EPITHELIAL, UA: ABNORMAL /HPF
SEG NEUTROPHILS: 67 % (ref 36–65)
SEGMENTED NEUTROPHILS ABSOLUTE COUNT: 9.08 K/UL (ref 1.5–8.1)
SODIUM BLD-SCNC: 135 MMOL/L (ref 135–144)
SPECIFIC GRAVITY UA: 1.01 (ref 1.01–1.02)
TOTAL PROTEIN: 8.1 G/DL (ref 6.4–8.3)
TRICHOMONAS: ABNORMAL
TURBIDITY: CLEAR
URINE HGB: ABNORMAL
UROBILINOGEN, URINE: NORMAL
WBC # BLD: 13.3 K/UL (ref 3.5–11.3)
WBC # BLD: ABNORMAL 10*3/UL
WBC UA: ABNORMAL /HPF (ref 0–5)
YEAST: ABNORMAL

## 2018-06-06 PROCEDURE — 36415 COLL VENOUS BLD VENIPUNCTURE: CPT

## 2018-06-06 PROCEDURE — 85025 COMPLETE CBC W/AUTO DIFF WBC: CPT

## 2018-06-06 PROCEDURE — 82570 ASSAY OF URINE CREATININE: CPT

## 2018-06-06 PROCEDURE — 81001 URINALYSIS AUTO W/SCOPE: CPT

## 2018-06-06 PROCEDURE — 87077 CULTURE AEROBIC IDENTIFY: CPT

## 2018-06-06 PROCEDURE — 83036 HEMOGLOBIN GLYCOSYLATED A1C: CPT

## 2018-06-06 PROCEDURE — 80053 COMPREHEN METABOLIC PANEL: CPT

## 2018-06-06 PROCEDURE — 82043 UR ALBUMIN QUANTITATIVE: CPT

## 2018-06-06 PROCEDURE — 87186 SC STD MICRODIL/AGAR DIL: CPT

## 2018-06-06 PROCEDURE — 87086 URINE CULTURE/COLONY COUNT: CPT

## 2018-06-07 ENCOUNTER — TELEPHONE (OUTPATIENT)
Dept: PRIMARY CARE CLINIC | Age: 33
End: 2018-06-07

## 2018-06-07 LAB
CULTURE: ABNORMAL
CULTURE: ABNORMAL
Lab: ABNORMAL
Lab: ABNORMAL
ORGANISM: ABNORMAL
SPECIMEN DESCRIPTION: ABNORMAL
SPECIMEN DESCRIPTION: ABNORMAL
STATUS: ABNORMAL

## 2018-06-08 ENCOUNTER — TELEPHONE (OUTPATIENT)
Dept: PRIMARY CARE CLINIC | Age: 33
End: 2018-06-08

## 2018-06-08 DIAGNOSIS — N30.00 ACUTE CYSTITIS WITHOUT HEMATURIA: Primary | ICD-10-CM

## 2018-06-08 RX ORDER — CIPROFLOXACIN 250 MG/1
250 TABLET, FILM COATED ORAL 2 TIMES DAILY
Qty: 10 TABLET | Refills: 0 | Status: SHIPPED | OUTPATIENT
Start: 2018-06-08 | End: 2018-06-13

## 2018-06-12 ENCOUNTER — OFFICE VISIT (OUTPATIENT)
Dept: PRIMARY CARE CLINIC | Age: 33
End: 2018-06-12
Payer: COMMERCIAL

## 2018-06-12 VITALS
SYSTOLIC BLOOD PRESSURE: 116 MMHG | TEMPERATURE: 97.2 F | BODY MASS INDEX: 36.67 KG/M2 | HEART RATE: 82 BPM | WEIGHT: 227.2 LBS | RESPIRATION RATE: 16 BRPM | DIASTOLIC BLOOD PRESSURE: 68 MMHG

## 2018-06-12 DIAGNOSIS — E11.9 CONTROLLED TYPE 2 DIABETES MELLITUS WITHOUT COMPLICATION, WITHOUT LONG-TERM CURRENT USE OF INSULIN (HCC): Primary | ICD-10-CM

## 2018-06-12 DIAGNOSIS — E78.5 DYSLIPIDEMIA: ICD-10-CM

## 2018-06-12 DIAGNOSIS — J41.1 BRONCHITIS, MUCOPURULENT RECURRENT (HCC): ICD-10-CM

## 2018-06-12 PROCEDURE — G8417 CALC BMI ABV UP PARAM F/U: HCPCS | Performed by: NURSE PRACTITIONER

## 2018-06-12 PROCEDURE — 99214 OFFICE O/P EST MOD 30 MIN: CPT | Performed by: NURSE PRACTITIONER

## 2018-06-12 PROCEDURE — 3045F PR MOST RECENT HEMOGLOBIN A1C LEVEL 7.0-9.0%: CPT | Performed by: NURSE PRACTITIONER

## 2018-06-12 PROCEDURE — 3023F SPIROM DOC REV: CPT | Performed by: NURSE PRACTITIONER

## 2018-06-12 PROCEDURE — 4004F PT TOBACCO SCREEN RCVD TLK: CPT | Performed by: NURSE PRACTITIONER

## 2018-06-12 PROCEDURE — 2022F DILAT RTA XM EVC RTNOPTHY: CPT | Performed by: NURSE PRACTITIONER

## 2018-06-12 PROCEDURE — G8427 DOCREV CUR MEDS BY ELIG CLIN: HCPCS | Performed by: NURSE PRACTITIONER

## 2018-06-12 PROCEDURE — G8926 SPIRO NO PERF OR DOC: HCPCS | Performed by: NURSE PRACTITIONER

## 2018-06-12 ASSESSMENT — ENCOUNTER SYMPTOMS
VOMITING: 0
FREQUENT THROAT CLEARING: 0
CONSTIPATION: 0
DIARRHEA: 0
BLURRED VISION: 0
SORE THROAT: 0
NAUSEA: 0
CHEST TIGHTNESS: 0
WHEEZING: 0
HOARSE VOICE: 0
DIFFICULTY BREATHING: 0
SPUTUM PRODUCTION: 0
RHINORRHEA: 0
COUGH: 0
SHORTNESS OF BREATH: 0
HEMOPTYSIS: 0

## 2018-06-12 ASSESSMENT — PATIENT HEALTH QUESTIONNAIRE - PHQ9
SUM OF ALL RESPONSES TO PHQ QUESTIONS 1-9: 1
SUM OF ALL RESPONSES TO PHQ9 QUESTIONS 1 & 2: 1
1. LITTLE INTEREST OR PLEASURE IN DOING THINGS: 0
2. FEELING DOWN, DEPRESSED OR HOPELESS: 1

## 2018-06-12 ASSESSMENT — COPD QUESTIONNAIRES: COPD: 1

## 2018-06-14 ENCOUNTER — TELEPHONE (OUTPATIENT)
Dept: PRIMARY CARE CLINIC | Age: 33
End: 2018-06-14

## 2018-06-14 DIAGNOSIS — E11.9 CONTROLLED TYPE 2 DIABETES MELLITUS WITHOUT COMPLICATION, WITHOUT LONG-TERM CURRENT USE OF INSULIN (HCC): Primary | ICD-10-CM

## 2018-06-15 ENCOUNTER — TELEPHONE (OUTPATIENT)
Dept: PRIMARY CARE CLINIC | Age: 33
End: 2018-06-15

## 2018-07-04 DIAGNOSIS — E11.9 CONTROLLED TYPE 2 DIABETES MELLITUS WITHOUT COMPLICATION, WITHOUT LONG-TERM CURRENT USE OF INSULIN (HCC): ICD-10-CM

## 2018-07-05 RX ORDER — LISINOPRIL 2.5 MG/1
TABLET ORAL
Qty: 90 TABLET | Refills: 0 | Status: SHIPPED | OUTPATIENT
Start: 2018-07-05 | End: 2018-09-03 | Stop reason: SDUPTHER

## 2018-07-17 ENCOUNTER — HOSPITAL ENCOUNTER (OUTPATIENT)
Age: 33
Setting detail: SPECIMEN
Discharge: HOME | End: 2018-07-17
Payer: COMMERCIAL

## 2018-07-17 ENCOUNTER — OFFICE VISIT (OUTPATIENT)
Dept: UROLOGY | Age: 33
End: 2018-07-17
Payer: COMMERCIAL

## 2018-07-17 VITALS
BODY MASS INDEX: 36.48 KG/M2 | SYSTOLIC BLOOD PRESSURE: 110 MMHG | DIASTOLIC BLOOD PRESSURE: 80 MMHG | TEMPERATURE: 98 F | WEIGHT: 227 LBS | HEIGHT: 66 IN

## 2018-07-17 DIAGNOSIS — R35.1 NOCTURIA: ICD-10-CM

## 2018-07-17 DIAGNOSIS — N39.46 MIXED INCONTINENCE: ICD-10-CM

## 2018-07-17 DIAGNOSIS — R35.0 FREQUENCY OF URINATION: ICD-10-CM

## 2018-07-17 DIAGNOSIS — K59.00 CONSTIPATION, UNSPECIFIED CONSTIPATION TYPE: ICD-10-CM

## 2018-07-17 DIAGNOSIS — N39.46 MIXED INCONTINENCE: Primary | ICD-10-CM

## 2018-07-17 LAB
-: ABNORMAL
AMORPHOUS: ABNORMAL
BACTERIA: ABNORMAL
BILIRUBIN URINE: NEGATIVE
CASTS UA: ABNORMAL /LPF
COLOR: YELLOW
COMMENT UA: ABNORMAL
CRYSTALS, UA: ABNORMAL /HPF
EPITHELIAL CELLS UA: ABNORMAL /HPF (ref 0–25)
GLUCOSE URINE: ABNORMAL
KETONES, URINE: NEGATIVE
LEUKOCYTE ESTERASE, URINE: NEGATIVE
MUCUS: ABNORMAL
NITRITE, URINE: NEGATIVE
OTHER OBSERVATIONS UA: ABNORMAL
PH UA: 6 (ref 5–9)
PROTEIN UA: NEGATIVE
RBC UA: ABNORMAL /HPF (ref 0–2)
RENAL EPITHELIAL, UA: ABNORMAL /HPF
SPECIFIC GRAVITY UA: >1.03 (ref 1.01–1.02)
TRICHOMONAS: ABNORMAL
TURBIDITY: ABNORMAL
URINE HGB: ABNORMAL
UROBILINOGEN, URINE: NORMAL
WBC UA: ABNORMAL /HPF (ref 0–5)
YEAST: ABNORMAL

## 2018-07-17 PROCEDURE — G8417 CALC BMI ABV UP PARAM F/U: HCPCS | Performed by: NURSE PRACTITIONER

## 2018-07-17 PROCEDURE — 87086 URINE CULTURE/COLONY COUNT: CPT

## 2018-07-17 PROCEDURE — 99215 OFFICE O/P EST HI 40 MIN: CPT | Performed by: NURSE PRACTITIONER

## 2018-07-17 PROCEDURE — 51798 US URINE CAPACITY MEASURE: CPT | Performed by: NURSE PRACTITIONER

## 2018-07-17 PROCEDURE — 4004F PT TOBACCO SCREEN RCVD TLK: CPT | Performed by: NURSE PRACTITIONER

## 2018-07-17 PROCEDURE — G8427 DOCREV CUR MEDS BY ELIG CLIN: HCPCS | Performed by: NURSE PRACTITIONER

## 2018-07-17 PROCEDURE — 86403 PARTICLE AGGLUT ANTBDY SCRN: CPT

## 2018-07-17 PROCEDURE — 81001 URINALYSIS AUTO W/SCOPE: CPT

## 2018-07-17 RX ORDER — POLYETHYLENE GLYCOL 3350 17 G/17G
17 POWDER, FOR SOLUTION ORAL DAILY
Qty: 510 G | Refills: 11 | Status: SHIPPED | OUTPATIENT
Start: 2018-07-17 | End: 2018-08-16

## 2018-07-17 RX ORDER — OXYBUTYNIN CHLORIDE 10 MG/1
10 TABLET, EXTENDED RELEASE ORAL DAILY
Qty: 30 TABLET | Refills: 1 | Status: SHIPPED | OUTPATIENT
Start: 2018-07-17 | End: 2018-09-03 | Stop reason: SDUPTHER

## 2018-07-17 NOTE — PROGRESS NOTES
TABLET BY MOUTH ONCE DAILY 30 tablet 3    FARXIGA 5 MG tablet TAKE ONE TABLET BY MOUTH IN THE MORNING 90 tablet 3    venlafaxine (EFFEXOR XR) 150 MG extended release capsule TAKE ONE CAPSULE BY MOUTH ONCE DAILY 90 capsule 1    atorvastatin (LIPITOR) 20 MG tablet TAKE ONE TABLET BY MOUTH ONCE DAILY 90 tablet 1    albuterol sulfate HFA (VENTOLIN HFA) 108 (90 Base) MCG/ACT inhaler Inhale 2 puffs into the lungs every 6 hours as needed for Wheezing 1 Inhaler 3    JENTADUETO 2.5-1000 MG TABS TAKE ONE TABLET BY MOUTH TWICE DAILY 60 tablet 11    glucose monitoring kit (FREESTYLE) monitoring kit 1 kit by Does not apply route daily Substitute as needed for insurance requirements. 1 kit 0    glucose blood VI test strips (TRUE METRIX BLOOD GLUCOSE TEST) strip 1 each by In Vitro route daily As needed. 100 each 3    Lancets MISC Substitute as needed for insurance requirements. Dx 250.00, testing daily. 100 each 3    [DISCONTINUED] oxybutynin (DITROPAN) 5 MG tablet Take 1 tablet by mouth 2 times daily 60 tablet 0     No facility-administered encounter medications on file as of 7/17/2018. Patient has no known allergies. History   Smoking Status    Current Some Day Smoker    Packs/day: 0.25    Types: Cigarettes   Smokeless Tobacco    Never Used     Comment: 1-2 cigs a day     History   Alcohol Use    Yes     Comment: rarely       Vitals:    07/17/18 1354   BP: 110/80   Temp: 98 °F (36.7 °C)     PHYSICAL EXAM:  Constitutional: Patient resting comfortably, in no acute distress. Neuro: Alert and oriented to person place and time. Cranial nerves grossly intact. Psych: Mood and affect normal.  Skin: Warm, dry  HEENT: normocephalic, atraumatic  Lymphatics: No palpable lymphadenopathy  Lungs: Respiratory effort normal, unlabored  Cardiovascular:  Normal peripheral pulses  Abdomen: Soft, non-tender, non-distended with no organomegaly or palpable masses. : No CVA tenderness.  Bladder non-tender and not symptoms. She was instructed to start MiraLAX daily and was sent home with written instructions on how to take this medication. We will check a UA C&S today. We'll start her on oxybutynin extended release 10 mg daily. I did explain that the extended release formulations should have less side effects. We'll see her back in 6-8 weeks to reevaluate her urinary symptoms.

## 2018-07-18 LAB
CULTURE: ABNORMAL
Lab: ABNORMAL
SPECIMEN DESCRIPTION: ABNORMAL
STATUS: ABNORMAL

## 2018-07-19 ENCOUNTER — TELEPHONE (OUTPATIENT)
Dept: UROLOGY | Age: 33
End: 2018-07-19

## 2018-07-19 RX ORDER — CEPHALEXIN 500 MG/1
500 CAPSULE ORAL 3 TIMES DAILY
Qty: 21 CAPSULE | Refills: 0 | Status: SHIPPED | OUTPATIENT
Start: 2018-07-19 | End: 2018-07-26

## 2018-07-20 NOTE — TELEPHONE ENCOUNTER
UACS is positive for infection. I sent in keflex to treat this infection. Take this antibiotic until gone. Take this with food and eat yogurt once per day to prevent GI upset. If you develop nausea, vomiting, or fevers call the office or go to the ER. If your urinary symptoms do not improve once completing the antibiotics call our office.  Keep f/u as planned

## 2018-08-07 DIAGNOSIS — F41.9 ANXIETY AND DEPRESSION: ICD-10-CM

## 2018-08-07 DIAGNOSIS — N89.8 VAGINAL ITCHING: ICD-10-CM

## 2018-08-07 DIAGNOSIS — K52.9 AGE (ACUTE GASTROENTERITIS): ICD-10-CM

## 2018-08-07 DIAGNOSIS — F32.A ANXIETY AND DEPRESSION: ICD-10-CM

## 2018-08-07 DIAGNOSIS — E78.5 DYSLIPIDEMIA: ICD-10-CM

## 2018-08-08 RX ORDER — PROMETHAZINE HYDROCHLORIDE 25 MG/1
25 TABLET ORAL EVERY 6 HOURS PRN
Qty: 12 TABLET | Refills: 0 | OUTPATIENT
Start: 2018-08-08 | End: 2018-08-15

## 2018-08-08 RX ORDER — ATORVASTATIN CALCIUM 20 MG/1
TABLET, FILM COATED ORAL
Qty: 90 TABLET | Refills: 1 | Status: SHIPPED | OUTPATIENT
Start: 2018-08-08 | End: 2019-01-07 | Stop reason: SDUPTHER

## 2018-08-08 RX ORDER — HYDROXYZINE 50 MG/1
TABLET, FILM COATED ORAL
Qty: 60 TABLET | Refills: 2 | Status: SHIPPED | OUTPATIENT
Start: 2018-08-08 | End: 2018-11-07 | Stop reason: SDUPTHER

## 2018-08-08 RX ORDER — VENLAFAXINE HYDROCHLORIDE 150 MG/1
CAPSULE, EXTENDED RELEASE ORAL
Qty: 90 CAPSULE | Refills: 1 | Status: SHIPPED | OUTPATIENT
Start: 2018-08-08 | End: 2019-01-07 | Stop reason: SDUPTHER

## 2018-08-08 NOTE — TELEPHONE ENCOUNTER
Health Maintenance   Topic Date Due    Diabetic foot exam  08/03/2017    Lipid screen  05/26/2018    Cervical cancer screen  08/19/2018    DTaP/Tdap/Td vaccine (1 - Tdap) 06/12/2019 (Originally 9/2/2006)    Flu vaccine (1) 09/01/2018    Diabetic retinal exam  03/29/2019    A1C test (Diabetic or Prediabetic)  06/06/2019    Diabetic microalbuminuria test  06/06/2019    Potassium monitoring  06/06/2019    Creatinine monitoring  06/06/2019    Pneumococcal med risk  Completed    HIV screen  Completed             (applicable per patient's age: Cancer Screenings, Depression Screening, Fall Risk Screening, Immunizations)    Hemoglobin A1C (%)   Date Value   06/06/2018 8.5 (H)   05/26/2017 6.8 (H)   10/28/2016 6.2 (H)     Microalb/Crt.  Ratio (mcg/mg creat)   Date Value   06/06/2018 111 (H)     LDL Cholesterol (mg/dL)   Date Value   05/26/2017 75     AST (U/L)   Date Value   06/06/2018 9     ALT (U/L)   Date Value   06/06/2018 16     BUN (mg/dL)   Date Value   06/06/2018 13      (goal A1C is < 7)   (goal LDL is <100) need 30-50% reduction from baseline     BP Readings from Last 3 Encounters:   07/17/18 110/80   06/12/18 116/68   05/07/18 102/73    (goal /80)      All Future Testing planned in [facility]:  Lab Frequency Next Occurrence   Hemoglobin A1C Once 09/10/2018   Lipid Panel Once 09/10/2018       Next Visit Date:  Future Appointments  Date Time Provider Rohini Cabral   8/28/2018 1:30 PM NINA Shepherd CNP[facility]   9/11/2018 4:30 PM NINA Ewing CNP [facility]            Patient Active Problem List:     Dyslipidemia     Insomnia     Anxiety and depression     Morbidly obese (HCC) BMI 38.1     Adopted     Bronchitis, mucopurulent recurrent ([address]     Urinary urgency     Female stress incontinence     Controlled type 2 diabetes mellitus without complication, without long-term current use of insulin ([address]

## 2018-08-09 DIAGNOSIS — K52.9 AGE (ACUTE GASTROENTERITIS): ICD-10-CM

## 2018-08-09 DIAGNOSIS — N89.8 VAGINAL ITCHING: ICD-10-CM

## 2018-08-10 DIAGNOSIS — E11.9 TYPE 2 DIABETES MELLITUS WITHOUT COMPLICATION, WITHOUT LONG-TERM CURRENT USE OF INSULIN (HCC): ICD-10-CM

## 2018-08-10 DIAGNOSIS — E11.9 TYPE 2 DIABETES MELLITUS WITHOUT COMPLICATION (HCC): ICD-10-CM

## 2018-08-10 DIAGNOSIS — J30.1 SEASONAL ALLERGIC RHINITIS DUE TO POLLEN: ICD-10-CM

## 2018-08-10 RX ORDER — BLOOD-GLUCOSE METER
1 KIT MISCELLANEOUS DAILY
Qty: 1 KIT | Refills: 0 | Status: SHIPPED | OUTPATIENT
Start: 2018-08-10 | End: 2021-05-07 | Stop reason: SDUPTHER

## 2018-08-10 RX ORDER — LORATADINE 10 MG/1
TABLET ORAL
Qty: 90 TABLET | Refills: 3 | Status: SHIPPED | OUTPATIENT
Start: 2018-08-10 | End: 2019-05-22 | Stop reason: ALTCHOICE

## 2018-08-10 RX ORDER — PROMETHAZINE HYDROCHLORIDE 25 MG/1
25 TABLET ORAL EVERY 6 HOURS PRN
Qty: 12 TABLET | Refills: 0 | Status: SHIPPED | OUTPATIENT
Start: 2018-08-10 | End: 2018-08-17

## 2018-08-10 NOTE — TELEPHONE ENCOUNTER
From: Shahrma Shankar  Sent: 8/10/2018 11:02 AM EDT  Subject: Medication Renewal Request    Shahram Shankar would like a refill of the following medications:     Chris Overall 2.5-1000 MG TABS [Silvio Johnson, APRN - CNP]    Preferred pharmacy: [address], [id number] [place] [address]    Comment:

## 2018-08-10 NOTE — TELEPHONE ENCOUNTER
Health Maintenance   Topic Date Due    Diabetic foot exam  08/03/2017    Lipid screen  05/26/2018    Cervical cancer screen  08/19/2018    DTaP/Tdap/Td vaccine (1 - Tdap) 06/12/2019 (Originally 9/2/2006)    Flu vaccine (1) 09/01/2018    Diabetic retinal exam  03/29/2019    A1C test (Diabetic or Prediabetic)  06/06/2019    Diabetic microalbuminuria test  06/06/2019    Potassium monitoring  06/06/2019    Creatinine monitoring  06/06/2019    Pneumococcal med risk  Completed    HIV screen  Completed             (applicable per patient's age: Cancer Screenings, Depression Screening, Fall Risk Screening, Immunizations)    Hemoglobin A1C (%)   Date Value   06/06/2018 8.5 (H)   05/26/2017 6.8 (H)   10/28/2016 6.2 (H)     Microalb/Crt.  Ratio (mcg/mg creat)   Date Value   06/06/2018 111 (H)     LDL Cholesterol (mg/dL)   Date Value   05/26/2017 75     AST (U/L)   Date Value   06/06/2018 9     ALT (U/L)   Date Value   06/06/2018 16     BUN (mg/dL)   Date Value   06/06/2018 13      (goal A1C is < 7)   (goal LDL is <100) need 30-50% reduction from baseline     BP Readings from Last 3 Encounters:   07/17/18 110/80   06/12/18 116/68   05/07/18 102/73    (goal /80)      All Future Testing planned in [facility]:  Lab Frequency Next Occurrence   Hemoglobin A1C Once 09/10/2018   Lipid Panel Once 09/10/2018       Next Visit Date:  Future Appointments  Date Time Provider Rohini Cabral   8/28/2018 1:30 PM NINA John CNP[facility]   9/11/2018 4:30 PM NINA Gandara - CNP [facility]            Patient Active Problem List:     Dyslipidemia     Insomnia     Anxiety and depression     Morbidly obese (HCC) BMI 38.1     Adopted     Bronchitis, mucopurulent recurrent ([facility] [address]     Urinary urgency     Female stress incontinence     Controlled type 2 diabetes mellitus without complication, without long-term current use of insulin ([address]

## 2018-08-12 ASSESSMENT — ENCOUNTER SYMPTOMS
EYE REDNESS: 0
SHORTNESS OF BREATH: 0
WHEEZING: 0
COLOR CHANGE: 0
BACK PAIN: 0
VOMITING: 0
COUGH: 0
NAUSEA: 0
ABDOMINAL PAIN: 0
EYE PAIN: 0
CONSTIPATION: 1

## 2018-09-03 DIAGNOSIS — K52.9 AGE (ACUTE GASTROENTERITIS): ICD-10-CM

## 2018-09-03 DIAGNOSIS — E11.9 CONTROLLED TYPE 2 DIABETES MELLITUS WITHOUT COMPLICATION, WITHOUT LONG-TERM CURRENT USE OF INSULIN (HCC): ICD-10-CM

## 2018-09-04 RX ORDER — PROMETHAZINE HYDROCHLORIDE 25 MG/1
25 TABLET ORAL EVERY 6 HOURS PRN
Qty: 12 TABLET | Refills: 0 | OUTPATIENT
Start: 2018-09-04 | End: 2018-09-11

## 2018-09-04 RX ORDER — LISINOPRIL 2.5 MG/1
TABLET ORAL
Qty: 90 TABLET | Refills: 0 | Status: SHIPPED | OUTPATIENT
Start: 2018-09-04 | End: 2018-11-07 | Stop reason: SDUPTHER

## 2018-09-06 DIAGNOSIS — E11.9 CONTROLLED TYPE 2 DIABETES MELLITUS WITHOUT COMPLICATION, WITHOUT LONG-TERM CURRENT USE OF INSULIN (HCC): Primary | ICD-10-CM

## 2018-09-07 ENCOUNTER — TELEPHONE (OUTPATIENT)
Dept: PRIMARY CARE CLINIC | Age: 33
End: 2018-09-07

## 2018-09-18 ENCOUNTER — OFFICE VISIT (OUTPATIENT)
Dept: UROLOGY | Age: 33
End: 2018-09-18
Payer: COMMERCIAL

## 2018-09-18 ENCOUNTER — HOSPITAL ENCOUNTER (OUTPATIENT)
Age: 33
Setting detail: SPECIMEN
Discharge: HOME | End: 2018-09-18
Payer: COMMERCIAL

## 2018-09-18 VITALS — BODY MASS INDEX: 37.45 KG/M2 | SYSTOLIC BLOOD PRESSURE: 108 MMHG | DIASTOLIC BLOOD PRESSURE: 78 MMHG | WEIGHT: 232 LBS

## 2018-09-18 DIAGNOSIS — N39.46 MIXED INCONTINENCE: ICD-10-CM

## 2018-09-18 DIAGNOSIS — R35.0 FREQUENCY OF URINATION: ICD-10-CM

## 2018-09-18 DIAGNOSIS — R35.1 NOCTURIA: ICD-10-CM

## 2018-09-18 DIAGNOSIS — R35.1 NOCTURIA: Primary | ICD-10-CM

## 2018-09-18 LAB
-: ABNORMAL
AMORPHOUS: ABNORMAL
BACTERIA: ABNORMAL
BILIRUBIN URINE: NEGATIVE
CASTS UA: ABNORMAL /LPF
COLOR: YELLOW
COMMENT UA: ABNORMAL
CRYSTALS, UA: ABNORMAL /HPF
EPITHELIAL CELLS UA: ABNORMAL /HPF (ref 0–25)
GLUCOSE URINE: ABNORMAL
KETONES, URINE: NEGATIVE
LEUKOCYTE ESTERASE, URINE: NEGATIVE
MUCUS: ABNORMAL
NITRITE, URINE: NEGATIVE
OTHER OBSERVATIONS UA: ABNORMAL
PH UA: 5.5 (ref 5–9)
PROTEIN UA: NEGATIVE
RBC UA: ABNORMAL /HPF (ref 0–2)
RENAL EPITHELIAL, UA: ABNORMAL /HPF
SPECIFIC GRAVITY UA: >1.03 (ref 1.01–1.02)
TRICHOMONAS: ABNORMAL
TURBIDITY: CLEAR
URINE HGB: ABNORMAL
UROBILINOGEN, URINE: NORMAL
WBC UA: ABNORMAL /HPF (ref 0–5)
YEAST: ABNORMAL

## 2018-09-18 PROCEDURE — 81001 URINALYSIS AUTO W/SCOPE: CPT

## 2018-09-18 PROCEDURE — 99214 OFFICE O/P EST MOD 30 MIN: CPT | Performed by: NURSE PRACTITIONER

## 2018-09-18 PROCEDURE — 4004F PT TOBACCO SCREEN RCVD TLK: CPT | Performed by: NURSE PRACTITIONER

## 2018-09-18 PROCEDURE — G8427 DOCREV CUR MEDS BY ELIG CLIN: HCPCS | Performed by: NURSE PRACTITIONER

## 2018-09-18 PROCEDURE — 87086 URINE CULTURE/COLONY COUNT: CPT

## 2018-09-18 PROCEDURE — G8417 CALC BMI ABV UP PARAM F/U: HCPCS | Performed by: NURSE PRACTITIONER

## 2018-09-18 PROCEDURE — 51798 US URINE CAPACITY MEASURE: CPT | Performed by: NURSE PRACTITIONER

## 2018-09-18 RX ORDER — TROSPIUM CHLORIDE 20 MG/1
20 TABLET, FILM COATED ORAL 2 TIMES DAILY
Qty: 60 TABLET | Refills: 3 | Status: SHIPPED | OUTPATIENT
Start: 2018-09-18 | End: 2019-01-07 | Stop reason: SDUPTHER

## 2018-09-18 ASSESSMENT — ENCOUNTER SYMPTOMS
ABDOMINAL PAIN: 0
COLOR CHANGE: 0
WHEEZING: 0
NAUSEA: 0
EYE REDNESS: 0
COUGH: 0
VOMITING: 0
BACK PAIN: 0
SHORTNESS OF BREATH: 0
CONSTIPATION: 0
EYE PAIN: 0

## 2018-09-18 NOTE — PROGRESS NOTES
Depression     Female stress incontinence 11/23/2015    Obesity     Type II or unspecified type diabetes mellitus without mention of complication, not stated as uncontrolled     Wears glasses      Past Surgical History:   Procedure Laterality Date    TUBAL LIGATION      WISDOM TOOTH EXTRACTION       Family History   Problem Relation Age of Onset    Adopted: Yes     Current Outpatient Prescriptions on File Prior to Visit   Medication Sig Dispense Refill    blood glucose test strips (TRUE METRIX BLOOD GLUCOSE TEST) strip 1 each by In Vitro route daily As needed. 100 each 3    lisinopril (PRINIVIL;ZESTRIL) 2.5 MG tablet TAKE 1 TABLET BY MOUTH ONCE DAILY 90 tablet 0    terconazole (TERAZOL 7) 0.4 % vaginal cream PLACE VAGINALLY ONCE PER NIGHT 1 Tube 0    linagliptin-metformin (JENTADUETO) 2.5-1000 MG TABS TAKE ONE TABLET BY MOUTH TWICE DAILY 60 tablet 11    glucose monitoring kit (FREESTYLE) monitoring kit 1 kit by Does not apply route daily Substitute as needed for insurance requirements. 1 kit 0    loratadine (EQ ALLERGY RELIEF) 10 MG tablet TAKE ONE TABLET BY MOUTH ONCE DAILY 90 tablet 3    venlafaxine (EFFEXOR XR) 150 MG extended release capsule TAKE ONE CAPSULE BY MOUTH ONCE DAILY 90 capsule 1    atorvastatin (LIPITOR) 20 MG tablet TAKE ONE TABLET BY MOUTH ONCE DAILY 90 tablet 1    hydrOXYzine (ATARAX) 50 MG tablet TAKE 1 TABLET BY MOUTH TWICE DAILY AS NEEDED FOR ANXIETY 60 tablet 2    Exenatide (BYDUREON) 2 MG PEN Inject 1 pen into the skin once a week 4 pen 5    FARXIGA 5 MG tablet TAKE ONE TABLET BY MOUTH IN THE MORNING 90 tablet 3    albuterol sulfate HFA (VENTOLIN HFA) 108 (90 Base) MCG/ACT inhaler Inhale 2 puffs into the lungs every 6 hours as needed for Wheezing 1 Inhaler 3    Lancets MISC Substitute as needed for insurance requirements. Dx 250.00, testing daily. 100 each 3     No current facility-administered medications on file prior to visit.        Outpatient Encounter Prescriptions as Vitals:    09/18/18 1426   BP: 108/78     PHYSICAL EXAM:  Constitutional: Patient resting comfortably, in no acute distress. Neuro: Alert and oriented to person place and time. Cranial nerves grossly intact. Psych: Mood and affect normal.  Skin: Warm, dry  HEENT: normocephalic, atraumatic  Lymphatics: No palpable lymphadenopathy  Lungs: Respiratory effort normal, unlabored  Cardiovascular:  Normal peripheral pulses  Abdomen: Soft, non-tender, non-distended with no organomegaly or palpable masses. : No CVA tenderness. Bladder non-tender and not distended. Pelvic: Deferred      No results found for this visit on 09/18/18. Lab Results   Component Value Date    BUN 13 06/06/2018     Lab Results   Component Value Date    CREATININE 0.49 (L) 06/06/2018     Review of Systems   Constitutional: Negative for appetite change, chills and fever. Eyes: Negative for pain, redness and visual disturbance. Respiratory: Negative for cough, shortness of breath and wheezing. Cardiovascular: Negative for chest pain and leg swelling. Gastrointestinal: Negative for abdominal pain, constipation, nausea and vomiting. Genitourinary: Positive for enuresis, frequency and urgency. Negative for difficulty urinating, dysuria, flank pain, hematuria, pelvic pain, vaginal bleeding and vaginal discharge. Musculoskeletal: Negative for back pain, joint swelling and myalgias. Skin: Negative for color change, rash and wound. Neurological: Negative for dizziness, tremors and numbness. Hematological: Negative for adenopathy. Does not bruise/bleed easily. Objective:   Physical Exam    Assessment:       Diagnosis Orders   1. Nocturia  Urinalysis with Microscopic    Urine culture clean catch   2. Frequency of urination     3. Mixed incontinence             Plan: We will check a UA C&S today. We'll stop the oxybutynin and start her on trospium twice per day. She will continue MiraLAX daily.   She will continue to work

## 2018-09-19 ENCOUNTER — HOSPITAL ENCOUNTER (OUTPATIENT)
Age: 33
Discharge: HOME | End: 2018-09-19
Payer: COMMERCIAL

## 2018-09-19 DIAGNOSIS — E78.5 DYSLIPIDEMIA: ICD-10-CM

## 2018-09-19 DIAGNOSIS — E11.9 CONTROLLED TYPE 2 DIABETES MELLITUS WITHOUT COMPLICATION, WITHOUT LONG-TERM CURRENT USE OF INSULIN (HCC): ICD-10-CM

## 2018-09-19 LAB
CHOLESTEROL/HDL RATIO: 3.4
CHOLESTEROL: 126 MG/DL
CULTURE: NORMAL
ESTIMATED AVERAGE GLUCOSE: 171 MG/DL
HBA1C MFR BLD: 7.6 % (ref 4.8–5.9)
HDLC SERPL-MCNC: 37 MG/DL
LDL CHOLESTEROL: 58 MG/DL (ref 0–130)
Lab: NORMAL
SPECIMEN DESCRIPTION: NORMAL
STATUS: NORMAL
TRIGL SERPL-MCNC: 157 MG/DL
VLDLC SERPL CALC-MCNC: ABNORMAL MG/DL (ref 1–30)

## 2018-09-19 PROCEDURE — 80061 LIPID PANEL: CPT

## 2018-09-19 PROCEDURE — 83036 HEMOGLOBIN GLYCOSYLATED A1C: CPT

## 2018-09-19 PROCEDURE — 36415 COLL VENOUS BLD VENIPUNCTURE: CPT

## 2018-09-20 ENCOUNTER — TELEPHONE (OUTPATIENT)
Dept: UROLOGY | Age: 33
End: 2018-09-20

## 2018-10-03 RX ORDER — LANCETS 30 GAUGE
EACH MISCELLANEOUS
Qty: 100 EACH | Refills: 12 | Status: CANCELLED | OUTPATIENT
Start: 2018-10-03 | End: 2020-06-17

## 2018-10-09 ENCOUNTER — OFFICE VISIT (OUTPATIENT)
Dept: PRIMARY CARE CLINIC | Age: 33
End: 2018-10-09
Payer: COMMERCIAL

## 2018-10-09 VITALS
RESPIRATION RATE: 14 BRPM | WEIGHT: 227.2 LBS | HEIGHT: 66 IN | DIASTOLIC BLOOD PRESSURE: 63 MMHG | TEMPERATURE: 96.7 F | HEART RATE: 82 BPM | SYSTOLIC BLOOD PRESSURE: 114 MMHG | BODY MASS INDEX: 36.52 KG/M2

## 2018-10-09 DIAGNOSIS — E11.9 CONTROLLED TYPE 2 DIABETES MELLITUS WITHOUT COMPLICATION, WITHOUT LONG-TERM CURRENT USE OF INSULIN (HCC): Primary | ICD-10-CM

## 2018-10-09 DIAGNOSIS — E78.5 DYSLIPIDEMIA: ICD-10-CM

## 2018-10-09 DIAGNOSIS — Z23 NEED FOR INFLUENZA VACCINATION: ICD-10-CM

## 2018-10-09 DIAGNOSIS — F41.9 ANXIETY AND DEPRESSION: ICD-10-CM

## 2018-10-09 DIAGNOSIS — F32.A ANXIETY AND DEPRESSION: ICD-10-CM

## 2018-10-09 PROCEDURE — G8482 FLU IMMUNIZE ORDER/ADMIN: HCPCS | Performed by: NURSE PRACTITIONER

## 2018-10-09 PROCEDURE — 99214 OFFICE O/P EST MOD 30 MIN: CPT | Performed by: NURSE PRACTITIONER

## 2018-10-09 PROCEDURE — G8417 CALC BMI ABV UP PARAM F/U: HCPCS | Performed by: NURSE PRACTITIONER

## 2018-10-09 PROCEDURE — 3045F PR MOST RECENT HEMOGLOBIN A1C LEVEL 7.0-9.0%: CPT | Performed by: NURSE PRACTITIONER

## 2018-10-09 PROCEDURE — G8427 DOCREV CUR MEDS BY ELIG CLIN: HCPCS | Performed by: NURSE PRACTITIONER

## 2018-10-09 PROCEDURE — 90471 IMMUNIZATION ADMIN: CPT | Performed by: NURSE PRACTITIONER

## 2018-10-09 PROCEDURE — 4004F PT TOBACCO SCREEN RCVD TLK: CPT | Performed by: NURSE PRACTITIONER

## 2018-10-09 PROCEDURE — 2022F DILAT RTA XM EVC RTNOPTHY: CPT | Performed by: NURSE PRACTITIONER

## 2018-10-09 PROCEDURE — 90686 IIV4 VACC NO PRSV 0.5 ML IM: CPT | Performed by: NURSE PRACTITIONER

## 2018-10-09 RX ORDER — VENLAFAXINE HYDROCHLORIDE 75 MG/1
75 CAPSULE, EXTENDED RELEASE ORAL DAILY
Qty: 90 CAPSULE | Refills: 3 | Status: SHIPPED | OUTPATIENT
Start: 2018-10-09 | End: 2019-08-08 | Stop reason: SDUPTHER

## 2018-10-09 ASSESSMENT — ENCOUNTER SYMPTOMS
VISUAL CHANGE: 0
COUGH: 0
SHORTNESS OF BREATH: 0
NAUSEA: 0
SORE THROAT: 0
DIARRHEA: 0
CONSTIPATION: 0
WHEEZING: 0
RHINORRHEA: 0
ABDOMINAL PAIN: 0
VOMITING: 0

## 2018-10-09 NOTE — PATIENT INSTRUCTIONS
Patient Education        Influenza (Flu) Vaccine (Inactivated or Recombinant): What You Need to Know  Why get vaccinated? Influenza (\"flu\") is a contagious disease that spreads around the [place] every winter, usually between October and May. Flu is caused by influenza viruses and is spread mainly by coughing, sneezing, and close contact. Anyone can get flu. Flu strikes suddenly and can last several days. Symptoms vary by age, but can include:  · Fever/chills. · Sore throat. · Muscle aches. · Fatigue. · Cough. · Headache. · Runny or stuffy nose. Flu can also lead to pneumonia and blood infections, and cause diarrhea and seizures in children. If you have a medical condition, such as heart or lung disease, flu can make it worse. Flu is more dangerous for some people. Infants and young children, people 72years of age and older, pregnant women, and people with certain health conditions or a weakened immune system are at greatest risk. Each year thousands of people in the [facility] die from flu, and many more are hospitalized. Flu vaccine can:  · Keep you from getting flu. · Make flu less severe if you do get it. · Keep you from spreading flu to your family and other people. Inactivated and recombinant flu vaccines  A dose of flu vaccine is recommended every flu season. Children 6 months through 6years of age may need two doses during the same flu season. Everyone else needs only one dose each flu season. Some inactivated flu vaccines contain a very small amount of a mercury-based preservative called thimerosal. Studies have not shown thimerosal in vaccines to be harmful, but flu vaccines that do not contain thimerosal are available. There is no live flu virus in flu shots. They cannot cause the flu. There are many flu viruses, and they are always changing.  Each year a new flu vaccine is made to protect against three or four viruses that are likely to cause disease in the upcoming flu

## 2018-10-09 NOTE — PROGRESS NOTES
podiatrist.Eye exam is current. Hyperlipidemia   This is a chronic problem. The current episode started more than 1 year ago. The problem is controlled. Recent lipid tests were reviewed and are normal. Exacerbating diseases include diabetes and obesity. She has no history of liver disease. Factors aggravating her hyperlipidemia include fatty foods. Pertinent negatives include no chest pain, leg pain, myalgias or shortness of breath. Current antihyperlipidemic treatment includes statins. The current treatment provides moderate improvement of lipids. Compliance problems include adherence to exercise and adherence to diet. Risk factors for coronary artery disease include dyslipidemia, diabetes mellitus and obesity. Mental Health Problem   The primary symptoms do not include dysphoric mood, delusions, hallucinations, bizarre behavior, disorganized speech, negative symptoms or somatic symptoms. Primary symptoms comment: NERVOUSNESS. The onset of the illness is precipitated by emotional stress and a stressful event (RECENT MOVE). The degree of incapacity that she is experiencing as a consequence of her illness is moderate. Sequelae of the illness include harmed interpersonal relations. Additional symptoms of the illness include insomnia, fatigue, agitation, attention impairment and distractible. Additional symptoms of the illness do not include anhedonia, hypersomnia, appetite change, unexpected weight change, psychomotor retardation, feelings of worthlessness, euphoric mood, increased goal-directed activity, flight of ideas, inflated self-esteem, decreased need for sleep, poor judgment, visual change, headaches, abdominal pain or seizures. She does not admit to suicidal ideas. She does not have a plan to commit suicide. She does not contemplate harming herself. She has not already injured self. She does not contemplate injuring another person. She has not already  injured another person.  Risk factors that are present for mental illness include a history of mental illness. Past Medical History:     Past Medical History:   Diagnosis Date    Anxiety and depression     Depression     Female stress incontinence 11/23/2015    Obesity     Type II or unspecified type diabetes mellitus without mention of complication, not stated as uncontrolled     Wears glasses       Reviewed all health maintenance requirements and ordered appropriate tests  Health Maintenance Due   Topic Date Due    Diabetic foot exam  08/03/2017       Past Surgical History:     Past Surgical History:   Procedure Laterality Date    TUBAL LIGATION      WISDOM TOOTH EXTRACTION          Medications:       Prior to Admission medications    Medication Sig Start Date End Date Taking? Authorizing Provider   Exenatide (BYDUREON) 2 MG PEN Inject 1 pen into the skin once a week 10/9/18  Yes NINA Kan CNP   venlafaxine (EFFEXOR XR) 75 MG extended release capsule Take 1 capsule by mouth daily 10/9/18  Yes NINA Kan CNP   trospium (SANCTURA) 20 MG tablet Take 1 tablet by mouth 2 times daily 9/18/18  Yes NINA Cook CNP   blood glucose test strips (TRUE METRIX BLOOD GLUCOSE TEST) strip 1 each by In Vitro route daily As needed. 9/6/18  Yes NINA Kan CNP   lisinopril (PRINIVIL;ZESTRIL) 2.5 MG tablet TAKE 1 TABLET BY MOUTH ONCE DAILY 9/4/18  Yes NINA Kan CNP   linagliptin-metformin (JENTADUETO) 2.5-1000 MG TABS TAKE ONE TABLET BY MOUTH TWICE DAILY 8/10/18  Yes NINA Kan CNP   glucose monitoring kit (FREESTYLE) monitoring kit 1 kit by Does not apply route daily Substitute as needed for insurance requirements.  8/10/18  Yes NINA Kan CNP   loratadine (EQ ALLERGY RELIEF) 10 MG tablet TAKE ONE TABLET BY MOUTH ONCE DAILY 8/10/18  Yes NINA Kan CNP   venlafaxine (EFFEXOR XR) 150 MG extended release capsule TAKE ONE CAPSULE BY MOUTH ONCE DAILY 8/8/18  Yes Esther Johnson addressed. All patient questions answered. Pt voiced understanding. 5.  Reviewed prior labs and health maintenance  6. Continue current medications, diet and exercise. Completed Refills   Requested Prescriptions     Signed Prescriptions Disp Refills    Exenatide (BYDUREON) 2 MG PEN 4 pen 5     Sig: Inject 1 pen into the skin once a week    venlafaxine (EFFEXOR XR) 75 MG extended release capsule 90 capsule 3     Sig: Take 1 capsule by mouth daily         Return in about 6 months (around 4/9/2019) for check up.

## 2018-10-16 ENCOUNTER — TELEPHONE (OUTPATIENT)
Dept: PRIMARY CARE CLINIC | Age: 33
End: 2018-10-16

## 2018-11-07 DIAGNOSIS — F41.9 ANXIETY AND DEPRESSION: ICD-10-CM

## 2018-11-07 DIAGNOSIS — E11.9 CONTROLLED TYPE 2 DIABETES MELLITUS WITHOUT COMPLICATION, WITHOUT LONG-TERM CURRENT USE OF INSULIN (HCC): ICD-10-CM

## 2018-11-07 DIAGNOSIS — K52.9 AGE (ACUTE GASTROENTERITIS): ICD-10-CM

## 2018-11-07 DIAGNOSIS — F32.A ANXIETY AND DEPRESSION: ICD-10-CM

## 2018-11-07 RX ORDER — PROMETHAZINE HYDROCHLORIDE 25 MG/1
25 TABLET ORAL EVERY 6 HOURS PRN
Qty: 12 TABLET | Refills: 0 | OUTPATIENT
Start: 2018-11-07 | End: 2018-11-14

## 2018-11-07 RX ORDER — LISINOPRIL 2.5 MG/1
TABLET ORAL
Qty: 90 TABLET | Refills: 3 | Status: SHIPPED | OUTPATIENT
Start: 2018-11-07 | End: 2019-11-26 | Stop reason: SDUPTHER

## 2018-11-07 RX ORDER — HYDROXYZINE 50 MG/1
TABLET, FILM COATED ORAL
Qty: 60 TABLET | Refills: 2 | Status: SHIPPED | OUTPATIENT
Start: 2018-11-07 | End: 2019-02-18 | Stop reason: SDUPTHER

## 2018-11-07 NOTE — TELEPHONE ENCOUNTER
Last visit: 10/9/18  Last Med refill: 8/8/18    Next Visit Date:  Future Appointments  Date Time Provider Rohini Cabral   4/11/2019 2:00 PM NINA Duque - CNP [facility]       Health Maintenance   Topic Date Due    DTaP/Tdap/Td vaccine (1 - Tdap) 06/12/2019 (Originally 9/2/2006)    Cervical cancer screen  10/09/2019 (Originally 8/19/2018)    Diabetic retinal exam  03/29/2019    Diabetic microalbuminuria test  06/06/2019    Potassium monitoring  06/06/2019    Creatinine monitoring  06/06/2019    A1C test (Diabetic or Prediabetic)  09/19/2019    Lipid screen  09/19/2019    Diabetic foot exam  10/09/2019    Flu vaccine  Completed    Pneumococcal med risk  Completed    HIV screen  Completed       Hemoglobin A1C (%)   Date Value   09/19/2018 7.6 (H)   06/06/2018 8.5 (H)   05/26/2017 6.8 (H)             ( goal A1C is < 7)   Microalb/Crt.  Ratio (mcg/mg creat)   Date Value   06/06/2018 111 (H)     LDL Cholesterol (mg/dL)   Date Value   09/19/2018 58   05/26/2017 75       (goal LDL is <100)   AST (U/L)   Date Value   06/06/2018 9     ALT (U/L)   Date Value   06/06/2018 16     BUN (mg/dL)   Date Value   06/06/2018 13     BP Readings from Last 3 Encounters:   10/09/18 114/63   09/18/18 108/78   07/17/18 110/80          (goal 120/80)    All Future Testing planned in [facility]  Lab Frequency Next Occurrence   CBC Auto Differential Once 04/07/2019   Comprehensive Metabolic Panel Once 91/70/5112   Microalbumin, Ur Once 04/07/2019   Hemoglobin A1C Once 04/07/2019   Lipid Panel Once 04/07/2019               Patient Active Problem List:     Dyslipidemia     Insomnia     Anxiety and depression     Morbidly obese (HCC) BMI 38.1     Adopted     Bronchitis, mucopurulent recurrent (HCC)     Urinary urgency     Female stress incontinence     Controlled type 2 diabetes mellitus without complication, without long-term current use of insulin (HCC)     Nocturia     Frequency of urination     Mixed incontinence

## 2019-01-07 DIAGNOSIS — K52.9 AGE (ACUTE GASTROENTERITIS): ICD-10-CM

## 2019-01-07 DIAGNOSIS — F41.9 ANXIETY AND DEPRESSION: ICD-10-CM

## 2019-01-07 DIAGNOSIS — F32.A ANXIETY AND DEPRESSION: ICD-10-CM

## 2019-01-07 DIAGNOSIS — E78.5 DYSLIPIDEMIA: ICD-10-CM

## 2019-01-07 RX ORDER — ATORVASTATIN CALCIUM 20 MG/1
TABLET, FILM COATED ORAL
Qty: 90 TABLET | Refills: 3 | Status: SHIPPED | OUTPATIENT
Start: 2019-01-07 | End: 2020-02-06

## 2019-01-07 RX ORDER — PROMETHAZINE HYDROCHLORIDE 25 MG/1
25 TABLET ORAL EVERY 6 HOURS PRN
Qty: 12 TABLET | Refills: 0 | OUTPATIENT
Start: 2019-01-07 | End: 2019-01-14

## 2019-01-07 RX ORDER — VENLAFAXINE HYDROCHLORIDE 150 MG/1
CAPSULE, EXTENDED RELEASE ORAL
Qty: 90 CAPSULE | Refills: 3 | Status: SHIPPED | OUTPATIENT
Start: 2019-01-07 | End: 2020-02-06

## 2019-02-18 ENCOUNTER — TELEPHONE (OUTPATIENT)
Dept: PRIMARY CARE CLINIC | Age: 34
End: 2019-02-18

## 2019-02-18 DIAGNOSIS — F41.9 ANXIETY AND DEPRESSION: ICD-10-CM

## 2019-02-18 DIAGNOSIS — F32.A ANXIETY AND DEPRESSION: ICD-10-CM

## 2019-02-18 RX ORDER — HYDROXYZINE 50 MG/1
TABLET, FILM COATED ORAL
Qty: 60 TABLET | Refills: 2 | Status: SHIPPED | OUTPATIENT
Start: 2019-02-18 | End: 2019-05-22 | Stop reason: ALTCHOICE

## 2019-02-18 RX ORDER — DAPAGLIFLOZIN 5 MG/1
TABLET, FILM COATED ORAL
Qty: 30 TABLET | Refills: 11 | Status: CANCELLED | OUTPATIENT
Start: 2019-02-18

## 2019-02-19 ENCOUNTER — TELEPHONE (OUTPATIENT)
Dept: PRIMARY CARE CLINIC | Age: 34
End: 2019-02-19

## 2019-03-14 RX ORDER — TROSPIUM CHLORIDE 20 MG/1
TABLET, FILM COATED ORAL
Qty: 60 TABLET | Refills: 0 | OUTPATIENT
Start: 2019-03-14

## 2019-03-26 ENCOUNTER — TELEPHONE (OUTPATIENT)
Dept: BARIATRICS/WEIGHT MGMT | Age: 34
End: 2019-03-26

## 2019-03-26 NOTE — TELEPHONE ENCOUNTER
Online Info Session Completed:  on 3/19/19 okay to schedule with either surgeon. Verified Insurance Benefit   with [facility]    Appointment Note :   New Patient , [facility],   6   month visits,  PG Fee $350,  Mailed Packet or advised to arrive  early      Remind Patient of $350 Program fee with $ 100 required at Second visit with office on initial dietician visit. Remind Patient they must be nicotine free. They will be tested at the beginning of the program and prior to surgery. Advise Patient Responsible for out of pocket, copay at medical visits, Deductible and coinsurance applied to medical visits and procedure. You will be responsible for any of the following:  · Copays   · Deductibles   · Co insurances     The items mentioned above are indicated or required by your insurance plan. Your deductible and coinsurance are applied to medical visits and procedures. Verified with patient if he or she has had any previous bariatric surgery? no  ( If yes ,advise patient of transfer of care process and program fee)       .

## 2019-04-15 ENCOUNTER — TELEPHONE (OUTPATIENT)
Dept: PRIMARY CARE CLINIC | Age: 34
End: 2019-04-15

## 2019-04-15 NOTE — TELEPHONE ENCOUNTER
Attempted to call pt to remind to have labs done, no answer and unable to leave message due to mailbox being full.

## 2019-05-08 ENCOUNTER — TELEPHONE (OUTPATIENT)
Dept: PRIMARY CARE CLINIC | Age: 34
End: 2019-05-08

## 2019-05-08 NOTE — TELEPHONE ENCOUNTER
Attempted to call patient regarding need to schedule a missed appt and need to get fasting labs done. No answer and voice mailbox was full.

## 2019-05-14 ENCOUNTER — TELEPHONE (OUTPATIENT)
Dept: PRIMARY CARE CLINIC | Age: 34
End: 2019-05-14

## 2019-05-14 ENCOUNTER — OFFICE VISIT (OUTPATIENT)
Dept: PRIMARY CARE CLINIC | Age: 34
End: 2019-05-14
Payer: COMMERCIAL

## 2019-05-14 VITALS
SYSTOLIC BLOOD PRESSURE: 105 MMHG | RESPIRATION RATE: 18 BRPM | WEIGHT: 229.6 LBS | BODY MASS INDEX: 36.9 KG/M2 | HEART RATE: 90 BPM | HEIGHT: 66 IN | TEMPERATURE: 97.6 F | DIASTOLIC BLOOD PRESSURE: 80 MMHG | OXYGEN SATURATION: 96 %

## 2019-05-14 DIAGNOSIS — M94.0 COSTOCHONDRITIS, ACUTE: ICD-10-CM

## 2019-05-14 DIAGNOSIS — J20.9 ACUTE BRONCHITIS WITH BRONCHOSPASM: ICD-10-CM

## 2019-05-14 DIAGNOSIS — J40 BRONCHITIS: ICD-10-CM

## 2019-05-14 DIAGNOSIS — J30.1 SEASONAL ALLERGIC RHINITIS DUE TO POLLEN: Primary | ICD-10-CM

## 2019-05-14 PROCEDURE — G8417 CALC BMI ABV UP PARAM F/U: HCPCS | Performed by: NURSE PRACTITIONER

## 2019-05-14 PROCEDURE — 99214 OFFICE O/P EST MOD 30 MIN: CPT | Performed by: NURSE PRACTITIONER

## 2019-05-14 PROCEDURE — 4004F PT TOBACCO SCREEN RCVD TLK: CPT | Performed by: NURSE PRACTITIONER

## 2019-05-14 PROCEDURE — G8427 DOCREV CUR MEDS BY ELIG CLIN: HCPCS | Performed by: NURSE PRACTITIONER

## 2019-05-14 RX ORDER — MONTELUKAST SODIUM 10 MG/1
10 TABLET ORAL DAILY
Qty: 30 TABLET | Refills: 3 | Status: SHIPPED | OUTPATIENT
Start: 2019-05-14 | End: 2019-09-07 | Stop reason: SDUPTHER

## 2019-05-14 RX ORDER — ALBUTEROL SULFATE 90 UG/1
2 AEROSOL, METERED RESPIRATORY (INHALATION) EVERY 6 HOURS PRN
Qty: 1 INHALER | Refills: 3 | Status: SHIPPED | OUTPATIENT
Start: 2019-05-14 | End: 2022-01-26 | Stop reason: ALTCHOICE

## 2019-05-14 RX ORDER — METHYLPREDNISOLONE 4 MG/1
TABLET ORAL
Qty: 1 KIT | Refills: 0 | Status: SHIPPED | OUTPATIENT
Start: 2019-05-14 | End: 2019-05-20

## 2019-05-14 ASSESSMENT — ENCOUNTER SYMPTOMS
VOMITING: 0
COUGH: 1
RHINORRHEA: 1
ABDOMINAL PAIN: 0
DIARRHEA: 0
SINUS PAIN: 0
SWOLLEN GLANDS: 0
SORE THROAT: 1
WHEEZING: 0
NAUSEA: 0

## 2019-05-14 ASSESSMENT — PATIENT HEALTH QUESTIONNAIRE - PHQ9
1. LITTLE INTEREST OR PLEASURE IN DOING THINGS: 0
SUM OF ALL RESPONSES TO PHQ QUESTIONS 1-9: 1
SUM OF ALL RESPONSES TO PHQ QUESTIONS 1-9: 1
2. FEELING DOWN, DEPRESSED OR HOPELESS: 1
SUM OF ALL RESPONSES TO PHQ9 QUESTIONS 1 & 2: 1

## 2019-05-14 NOTE — PROGRESS NOTES
[facility] PHYSICIANS  [facility]  [address] [facility] [address]  Dept: [phone]  Dept Fax: [phone]    Bre Rees is a 34 y.o. female who [facility] the [facility] today for her medical conditions/complaints as noted below. Bre Rees is c/o of URI (\"for a few days. \" States \"when I coughe d a few days ago I felt something pop in my righ trib area. \" )      HPI:     Orlando Yarbrough is here today for a walk in care visit. URI    This is a new problem. The current episode started in the past 7 days. The problem has been waxing and waning. There has been no fever. Associated symptoms include chest pain (RIGHT RIB WITH COUGH), congestion, coughing, headaches, rhinorrhea, sneezing and a sore throat. Pertinent negatives include no abdominal pain, diarrhea, dysuria, ear pain, joint pain, joint swelling, nausea, neck pain, plugged ear sensation, rash, sinus pain, swollen glands, vomiting or wheezing. She has tried nothing for the symptoms. The treatment provided no relief. Past Medical History:   Diagnosis Date    Anxiety and depression     Depression     Female stress incontinence 11/23/2015    Obesity     Type II or unspecified type diabetes mellitus without mention of complication, not stated as uncontrolled     Wears glasses         Current Outpatient Medications   Medication Sig Dispense Refill    montelukast (SINGULAIR) 10 MG tablet Take 1 tablet by mouth daily 30 tablet 3    methylPREDNISolone (MEDROL DOSEPACK) 4 MG tablet Take by mouth.  1 kit 0    hydrOXYzine (ATARAX) 50 MG tablet TAKE 1 TABLET BY MOUTH TWICE DAILY AS NEEDED FOR ANXIETY 60 tablet 2    Ertugliflozin L-PyroglutamicAc (STEGLATRO) 15 MG TABS Take 1 tablet by mouth daily 90 tablet 1    atorvastatin (LIPITOR) 20 MG tablet TAKE 1 TABLET BY MOUTH ONCE DAILY 90 tablet 3    venlafaxine (EFFEXOR XR) 150 MG extended release capsule TAKE 1 CAPSULE BY MOUTH ONCE DAILY 90 capsule 3    lisinopril (PRINIVIL;ZESTRIL) 2.5 MG tablet TAKE 1 TABLET BY MOUTH ONCE DAILY 90 tablet 3    venlafaxine (EFFEXOR XR) 75 MG extended release capsule Take 1 capsule by mouth daily 90 capsule 3    blood glucose test strips (TRUE METRIX BLOOD GLUCOSE TEST) strip 1 each by In Vitro route daily As needed. 100 each 3    linagliptin-metformin (JENTADUETO) 2.5-1000 MG TABS TAKE ONE TABLET BY MOUTH TWICE DAILY 60 tablet 11    glucose monitoring kit (FREESTYLE) monitoring kit 1 kit by Does not apply route daily Substitute as needed for insurance requirements. 1 kit 0    loratadine (EQ ALLERGY RELIEF) 10 MG tablet TAKE ONE TABLET BY MOUTH ONCE DAILY 90 tablet 3    trospium (SANCTURA) 20 MG tablet TAKE 1 TABLET BY MOUTH TWICE DAILY 60 tablet 0    Exenatide (BYDUREON) 2 MG PEN Inject 1 pen into the skin once a week 4 pen 5    albuterol sulfate HFA (VENTOLIN HFA) 108 (90 Base) MCG/ACT inhaler Inhale 2 puffs into the lungs every 6 hours as needed for Wheezing 1 Inhaler 3    Lancets MISC Substitute as needed for insurance requirements. Dx 250.00, testing daily. 100 each 3     No current facility-administered medications for this visit. No Known Allergies    Subjective:      Review of Systems   HENT: Positive for congestion, rhinorrhea, sneezing and sore throat. Negative for ear pain and sinus pain. Respiratory: Positive for cough. Negative for wheezing. Cardiovascular: Positive for chest pain (RIGHT RIB WITH COUGH). Gastrointestinal: Negative for abdominal pain, diarrhea, nausea and vomiting. Genitourinary: Negative for dysuria. Musculoskeletal: Negative for joint pain and neck pain. Skin: Negative for rash. Neurological: Positive for headaches. Objective:     Physical Exam   Constitutional: She is oriented to person, place, and time. She appears well-developed and well-nourished. No distress.    HENT:   Right Ear: Tympanic membrane normal.   Left Ear: Tympanic membrane normal.   Nose: Mucosal edema present. No rhinorrhea. Mouth/Throat: Mucous membranes are normal. Mucous membranes are not dry. Posterior oropharyngeal erythema present. MILD POST NASAL DRIP   Eyes: Conjunctivae are normal.   Neck: Normal range of motion. Neck supple. Cardiovascular: Normal rate and regular rhythm. Pulmonary/Chest: Effort normal and breath sounds normal. She has no wheezes. She exhibits tenderness. She exhibits no mass, no crepitus, no edema and no deformity. Abdominal: Soft. Bowel sounds are normal.   Musculoskeletal: She exhibits tenderness. Lymphadenopathy:     She has no cervical adenopathy. Neurological: She is alert and oriented to person, place, and time. Skin: Skin is warm and dry. No rash noted. Nursing note and vitals reviewed. /80 (Site: Right Upper Arm, Position: Sitting, Cuff Size: Large Adult)   Pulse 90   Temp 97.6 °F (36.4 °C) (Temporal)   Resp 18   Ht 5' 6\" (1.676 m)   Wt 229 lb 9.6 oz (104.1 kg)   LMP 04/30/2019   SpO2 96%   BMI 37.06 kg/m²     Assessment:      Diagnosis Orders   1. Seasonal allergic rhinitis due to pollen  montelukast (SINGULAIR) 10 MG tablet   2. Costochondritis, acute  methylPREDNISolone (MEDROL DOSEPACK) 4 MG tablet       Plan:             Discussed exam, POCT findings, plan of care (including prescriptive and supportive as listed below) and follow-up atlength with patient and or Patient. Reviewed all prescribed and recommended medications, administration and side effects. Encouraged to return to [address] [place] [address] for noimprovement and or worsening of symptoms. To ER or call 911 if any difficulty breathing, shortness of breath, inability to swallow, hives or temp greater than 103 degrees. Questions answered. They verbalized understandingand agreement. No follow-ups on file.     Orders Placed This Encounter   Medications    montelukast (SINGULAIR) 10 MG tablet     Sig: Take 1 tablet by mouth daily     Dispense:  30 tablet Refill:  3    methylPREDNISolone (MEDROL DOSEPACK) 4 MG tablet     Sig: Take by mouth. Dispense:  1 kit     Refill:  0          All patient questions answered. Pt voiced understanding.       Electronically signed by NINA Wade CNP on 5/14/2019 at 12:19 PM

## 2019-05-14 NOTE — PATIENT INSTRUCTIONS
SURVEY:    You may be receiving a survey from [employer] regarding your visit today. Please complete the survey to enable us to provide the highest quality of care to you and your family. If you cannot score us a very good on any question, please call the office to discuss how we could have made your experience a very good one. Thank you. Patient Education        Allergies: Care Instructions  Your Care Instructions    Allergies occur when your body's defense system (immune system) overreacts to certain substances. The immune system treats a harmless substance as if it were a harmful germ or virus. Many things can cause this overreaction, including pollens, medicine, food, dust, animal dander, and mold. Allergies can be mild or severe. Mild allergies can be managed with home treatment. But medicine may be needed to prevent problems. Managing your allergies is an important part of staying healthy. Your doctor may suggest that you have allergy testing to help find out what is causing your allergies. When you know what things trigger your symptoms, you can avoid them. This can prevent allergy symptoms and other health problems. For severe allergies that cause reactions that affect your whole body (anaphylactic reactions), your doctor may prescribe a shot of epinephrine to carry with you in case you have a severe reaction. Learn how to give yourself the shot and keep it with you at all times. Make sure it is not . Follow-up care is a key part of your treatment and safety. Be sure to make and go to all appointments, and call your doctor if you are having problems. It's also a good idea to know your test results and keep a list of the medicines you take. How can you care for yourself at home? · If you have been told by your doctor that dust or dust mites are causing your allergy, decrease the dust around your bed:  ? Wash sheets, pillowcases, and other bedding in hot water every week. ?  Use think you are having a severe allergic reaction.     · You have symptoms in more than one body area, such as mild nausea and an itchy mouth.    After giving an epinephrine shot call 911, even if you feel better.   Call 911 if:    · You have symptoms of a severe allergic reaction. These may include:  ? Sudden raised, red areas (hives) all over your body. ? Swelling of the throat, mouth, lips, or tongue. ? Trouble breathing. ? Passing out (losing consciousness). Or you may feel very lightheaded or suddenly feel weak, confused, or restless.     · You have been given an epinephrine shot, even if you feel better.    Call your doctor now or seek immediate medical care if:    · You have symptoms of an allergic reaction, such as:  ? A rash or hives (raised, red areas on the skin). ? Itching. ? Swelling. ? Belly pain, nausea, or vomiting.    Watch closely for changes in your health, and be sure to contact your doctor if:    · You do not get better as expected. Where can you learn more? Go to [email or web address]://[email or web address]. org and sign in to your [employer] account. Enter Y556 in the [employer] box to learn more about \"Allergies: Care Instructions. \"     If you do not have an account, please click on the \"Sign Up Now\" link. Current as of: June 27, 2018  Content Version: 12.0  © 4393-2787 [employer]wise, Incorporated. Care instructions adapted under license by [facility] ([facility]). If you have questions about a medical condition or this instruction, always ask your healthcare professional. [place] [address] any warranty or liability for your use of this information.

## 2019-05-14 NOTE — TELEPHONE ENCOUNTER
Health Maintenance   Topic Date Due    Varicella Vaccine (1 of 2 - 13+ 2-dose series) 09/02/2000    Hepatitis B Vaccine (1 of 3 - Risk 3-dose series) 09/02/2006    Diabetic retinal exam  03/29/2019    DTaP/Tdap/Td vaccine (1 - Tdap) 06/12/2019 (Originally 9/2/2006)    Cervical cancer screen  10/09/2019 (Originally 8/19/2018)    Diabetic microalbuminuria test  06/06/2019    Potassium monitoring  06/06/2019    Creatinine monitoring  06/06/2019    A1C test (Diabetic or Prediabetic)  09/19/2019    Lipid screen  09/19/2019    Diabetic foot exam  10/09/2019    Flu vaccine  Completed    Pneumococcal 0-64 years Vaccine  Completed    HIV screen  Completed             (applicable per patient's age: Cancer Screenings, Depression Screening, Fall Risk Screening, Immunizations)    Hemoglobin A1C (%)   Date Value   09/19/2018 7.6 (H)   06/06/2018 8.5 (H)   05/26/2017 6.8 (H)     Microalb/Crt.  Ratio (mcg/mg creat)   Date Value   06/06/2018 111 (H)     LDL Cholesterol (mg/dL)   Date Value   09/19/2018 58     AST (U/L)   Date Value   06/06/2018 9     ALT (U/L)   Date Value   06/06/2018 16     BUN (mg/dL)   Date Value   06/06/2018 13      (goal A1C is < 7)   (goal LDL is <100) need 30-50% reduction from baseline     BP Readings from Last 3 Encounters:   05/14/19 105/80   10/09/18 114/63   09/18/18 108/78    (goal /80)      All Future Testing planned in [facility]:  Lab Frequency Next Occurrence   CBC Auto Differential Once 05/22/2019   Comprehensive Metabolic Panel Once 36/47/0687   Microalbumin, Ur Once 05/22/2019   Hemoglobin A1C Once 05/22/2019   Lipid Panel Once 05/22/2019       Next Visit Date:  Future Appointments   Date Time Provider Rohini Cabral   5/22/2019  9:30 AM Phil Johnson APRN - CNP [facility]            Patient Active Problem List:     Dyslipidemia     Insomnia     Anxiety and depression     Morbidly obese (HCC) BMI 38.1     Adopted     Bronchitis, mucopurulent recurrent ([address] Urinary urgency     Female stress incontinence     Controlled type 2 diabetes mellitus without complication, without long-term current use of insulin (HCC)     Nocturia     Frequency of urination     Mixed incontinence

## 2019-05-14 NOTE — LETTER
[facility]  [address] [address]  Phone: [phone]  Fax: [phone] [facility], NINA - CNP        May 14, 2019     Patient: Primus Michelle   YOB: 1985   Date of Visit: 5/14/2019       To Whom it May Concern:    Bob Mello was seen in my clinic on 5/14/2019. She may return to work on Wednesday May 15, 2019. .    If you have any questions or concerns, please don't hesitate to call.     Sincerely,         Joan Johnson, NINA - CNP

## 2019-05-15 ENCOUNTER — HOSPITAL ENCOUNTER (OUTPATIENT)
Age: 34
Discharge: HOME | End: 2019-05-15
Payer: COMMERCIAL

## 2019-05-15 DIAGNOSIS — E11.9 CONTROLLED TYPE 2 DIABETES MELLITUS WITHOUT COMPLICATION, WITHOUT LONG-TERM CURRENT USE OF INSULIN (HCC): ICD-10-CM

## 2019-05-15 LAB
ABSOLUTE EOS #: <0.03 K/UL (ref 0–0.44)
ABSOLUTE IMMATURE GRANULOCYTE: 0.09 K/UL (ref 0–0.3)
ABSOLUTE LYMPH #: 1.59 K/UL (ref 1.1–3.7)
ABSOLUTE MONO #: 0.4 K/UL (ref 0.1–1.2)
ALBUMIN SERPL-MCNC: 4.4 G/DL (ref 3.5–5.2)
ALBUMIN/GLOBULIN RATIO: 1.1 (ref 1–2.5)
ALP BLD-CCNC: 84 U/L (ref 35–104)
ALT SERPL-CCNC: 19 U/L (ref 5–33)
ANION GAP SERPL CALCULATED.3IONS-SCNC: 13 MMOL/L (ref 9–17)
AST SERPL-CCNC: 11 U/L
BASOPHILS # BLD: 0 % (ref 0–2)
BASOPHILS ABSOLUTE: 0.04 K/UL (ref 0–0.2)
BILIRUB SERPL-MCNC: 0.25 MG/DL (ref 0.3–1.2)
BUN BLDV-MCNC: 14 MG/DL (ref 6–20)
BUN/CREAT BLD: 29 (ref 9–20)
CALCIUM SERPL-MCNC: 9.4 MG/DL (ref 8.6–10.4)
CHLORIDE BLD-SCNC: 102 MMOL/L (ref 98–107)
CHOLESTEROL/HDL RATIO: 3.2
CHOLESTEROL: 146 MG/DL
CO2: 22 MMOL/L (ref 20–31)
CREAT SERPL-MCNC: 0.48 MG/DL (ref 0.5–0.9)
CREATININE URINE: 56.4 MG/DL (ref 28–217)
DIFFERENTIAL TYPE: ABNORMAL
EOSINOPHILS RELATIVE PERCENT: 0 % (ref 1–4)
ESTIMATED AVERAGE GLUCOSE: 171 MG/DL
GFR AFRICAN AMERICAN: >60 ML/MIN
GFR NON-AFRICAN AMERICAN: >60 ML/MIN
GFR SERPL CREATININE-BSD FRML MDRD: ABNORMAL ML/MIN/{1.73_M2}
GFR SERPL CREATININE-BSD FRML MDRD: ABNORMAL ML/MIN/{1.73_M2}
GLUCOSE BLD-MCNC: 156 MG/DL (ref 70–99)
HBA1C MFR BLD: 7.6 % (ref 4.8–5.9)
HCT VFR BLD CALC: 44.7 % (ref 36.3–47.1)
HDLC SERPL-MCNC: 45 MG/DL
HEMOGLOBIN: 14.5 G/DL (ref 11.9–15.1)
IMMATURE GRANULOCYTES: 1 %
LDL CHOLESTEROL: 89 MG/DL (ref 0–130)
LYMPHOCYTES # BLD: 12 % (ref 24–43)
MCH RBC QN AUTO: 29.9 PG (ref 25.2–33.5)
MCHC RBC AUTO-ENTMCNC: 32.4 G/DL (ref 28.4–34.8)
MCV RBC AUTO: 92.2 FL (ref 82.6–102.9)
MICROALBUMIN/CREAT 24H UR: 179 MG/L
MICROALBUMIN/CREAT UR-RTO: 317 MCG/MG CREAT
MONOCYTES # BLD: 3 % (ref 3–12)
NRBC AUTOMATED: 0 PER 100 WBC
PDW BLD-RTO: 12.5 % (ref 11.8–14.4)
PLATELET # BLD: 443 K/UL (ref 138–453)
PLATELET ESTIMATE: ABNORMAL
PMV BLD AUTO: 8.6 FL (ref 8.1–13.5)
POTASSIUM SERPL-SCNC: 4 MMOL/L (ref 3.7–5.3)
RBC # BLD: 4.85 M/UL (ref 3.95–5.11)
RBC # BLD: ABNORMAL 10*6/UL
SEG NEUTROPHILS: 84 % (ref 36–65)
SEGMENTED NEUTROPHILS ABSOLUTE COUNT: 11.4 K/UL (ref 1.5–8.1)
SODIUM BLD-SCNC: 137 MMOL/L (ref 135–144)
TOTAL PROTEIN: 8.4 G/DL (ref 6.4–8.3)
TRIGL SERPL-MCNC: 61 MG/DL
VLDLC SERPL CALC-MCNC: NORMAL MG/DL (ref 1–30)
WBC # BLD: 13.5 K/UL (ref 3.5–11.3)
WBC # BLD: ABNORMAL 10*3/UL

## 2019-05-15 PROCEDURE — 83036 HEMOGLOBIN GLYCOSYLATED A1C: CPT

## 2019-05-15 PROCEDURE — 80061 LIPID PANEL: CPT

## 2019-05-15 PROCEDURE — 36415 COLL VENOUS BLD VENIPUNCTURE: CPT

## 2019-05-15 PROCEDURE — 80053 COMPREHEN METABOLIC PANEL: CPT

## 2019-05-15 PROCEDURE — 82043 UR ALBUMIN QUANTITATIVE: CPT

## 2019-05-15 PROCEDURE — 82570 ASSAY OF URINE CREATININE: CPT

## 2019-05-15 PROCEDURE — 85025 COMPLETE CBC W/AUTO DIFF WBC: CPT

## 2019-05-22 ENCOUNTER — TELEPHONE (OUTPATIENT)
Dept: PRIMARY CARE CLINIC | Age: 34
End: 2019-05-22

## 2019-05-22 ENCOUNTER — OFFICE VISIT (OUTPATIENT)
Dept: PRIMARY CARE CLINIC | Age: 34
End: 2019-05-22
Payer: COMMERCIAL

## 2019-05-22 VITALS
DIASTOLIC BLOOD PRESSURE: 74 MMHG | BODY MASS INDEX: 36.52 KG/M2 | TEMPERATURE: 97.2 F | RESPIRATION RATE: 14 BRPM | WEIGHT: 227.2 LBS | SYSTOLIC BLOOD PRESSURE: 109 MMHG | HEIGHT: 66 IN | HEART RATE: 74 BPM

## 2019-05-22 DIAGNOSIS — E78.5 DYSLIPIDEMIA: ICD-10-CM

## 2019-05-22 DIAGNOSIS — E11.9 CONTROLLED TYPE 2 DIABETES MELLITUS WITHOUT COMPLICATION, WITHOUT LONG-TERM CURRENT USE OF INSULIN (HCC): Primary | ICD-10-CM

## 2019-05-22 DIAGNOSIS — J30.9 CHRONIC ALLERGIC RHINITIS: ICD-10-CM

## 2019-05-22 DIAGNOSIS — F51.01 PRIMARY INSOMNIA: ICD-10-CM

## 2019-05-22 PROCEDURE — G8427 DOCREV CUR MEDS BY ELIG CLIN: HCPCS | Performed by: NURSE PRACTITIONER

## 2019-05-22 PROCEDURE — 99214 OFFICE O/P EST MOD 30 MIN: CPT | Performed by: NURSE PRACTITIONER

## 2019-05-22 PROCEDURE — 3045F PR MOST RECENT HEMOGLOBIN A1C LEVEL 7.0-9.0%: CPT | Performed by: NURSE PRACTITIONER

## 2019-05-22 PROCEDURE — 2022F DILAT RTA XM EVC RTNOPTHY: CPT | Performed by: NURSE PRACTITIONER

## 2019-05-22 PROCEDURE — G8417 CALC BMI ABV UP PARAM F/U: HCPCS | Performed by: NURSE PRACTITIONER

## 2019-05-22 PROCEDURE — 4004F PT TOBACCO SCREEN RCVD TLK: CPT | Performed by: NURSE PRACTITIONER

## 2019-05-22 RX ORDER — LEVOCETIRIZINE DIHYDROCHLORIDE 5 MG/1
5 TABLET, FILM COATED ORAL NIGHTLY
Qty: 90 TABLET | Refills: 3 | Status: SHIPPED | OUTPATIENT
Start: 2019-05-22 | End: 2020-05-04

## 2019-05-22 RX ORDER — AMITRIPTYLINE HYDROCHLORIDE 25 MG/1
25 TABLET, FILM COATED ORAL NIGHTLY
Qty: 90 TABLET | Refills: 1 | Status: SHIPPED | OUTPATIENT
Start: 2019-05-22 | End: 2019-11-11 | Stop reason: SDUPTHER

## 2019-05-22 RX ORDER — MELATONIN 5 MG
TABLET,CHEWABLE ORAL
COMMUNITY
End: 2019-05-22 | Stop reason: ALTCHOICE

## 2019-05-22 ASSESSMENT — ENCOUNTER SYMPTOMS
SINUS PAIN: 0
NAUSEA: 0
SHORTNESS OF BREATH: 0
COUGH: 1
WHEEZING: 0
VOMITING: 0
SORE THROAT: 1
CONSTIPATION: 0
RHINORRHEA: 1
TROUBLE SWALLOWING: 0
ABDOMINAL PAIN: 0
DIARRHEA: 0
SWOLLEN GLANDS: 0

## 2019-05-22 NOTE — TELEPHONE ENCOUNTER
Patient notified of approval of the [address] [facility]. Message also left at [address].

## 2019-05-22 NOTE — PROGRESS NOTES
Name: Petar Power  : 1985         Chief Complaint:     Chief Complaint   Patient presents with    Diabetes     Routine office visit. States her blood sugars have been \"running in th e150's. \"     Hyperlipidemia    URI     X \"a couple weeks. \"        History of Present Illness:      Petar Power is a 34 y.o.  female who presents with Diabetes (Routine office visit. States her blood sugars have been \"running in th e150's. \" ); Hyperlipidemia; and URI (X \"a couple weeks. \" )      Percruby Manrique is here today for a routine office visit. Insomnia- using melatonin and doxylamine with little help, would like alternative medication     Diabetes   She presents for her follow-up diabetic visit. She has type 2 diabetes mellitus. Her disease course has been stable. Hypoglycemia symptoms include nervousness/anxiousness. Pertinent negatives for hypoglycemia include no dizziness or headaches. Pertinent negatives for diabetes include no chest pain, no fatigue, no foot paresthesias, no polydipsia, no polyphagia and no polyuria. There are no hypoglycemic complications. Symptoms are stable. Pertinent negatives for diabetic complications include no CVA, heart disease, nephropathy or peripheral neuropathy. Risk factors for coronary artery disease include diabetes mellitus, dyslipidemia, obesity and tobacco exposure. Current diabetic treatment includes oral agent (triple therapy). She is compliant with treatment all of the time. She is following a generally healthy diet. Meal planning includes avoidance of concentrated sweets. She has had a previous visit with a dietitian. She rarely participates in exercise. There is no change in her home blood glucose trend. Her breakfast blood glucose range is generally 140-180 mg/dl. An ACE inhibitor/angiotensin II receptor blocker is being taken. She does not see a podiatrist.Eye exam is not current. Hyperlipidemia   This is a chronic problem.  The current episode started more than 1 year ago. The problem is controlled. Recent lipid tests were reviewed and are normal. Exacerbating diseases include diabetes and obesity. She has no history of hypothyroidism or liver disease. Factors aggravating her hyperlipidemia include fatty foods. Pertinent negatives include no chest pain, leg pain, myalgias or shortness of breath. Current antihyperlipidemic treatment includes statins. The current treatment provides moderate improvement of lipids. Compliance problems include adherence to exercise and adherence to diet. Risk factors for coronary artery disease include diabetes mellitus, dyslipidemia, obesity, a sedentary lifestyle and stress. URI    This is a chronic problem. The current episode started more than 1 month ago. The problem has been waxing and waning. There has been no fever. Associated symptoms include congestion, coughing, rhinorrhea, sneezing and a sore throat. Pertinent negatives include no abdominal pain, chest pain, diarrhea, dysuria, ear pain, headaches, joint pain, joint swelling, nausea, neck pain, plugged ear sensation, rash, sinus pain, swollen glands, vomiting or wheezing. Treatments tried: SINGULAIR. The treatment provided mild relief. Past Medical History:     Past Medical History:   Diagnosis Date    Anxiety and depression     Depression     Female stress incontinence 11/23/2015    Obesity     Type II or unspecified type diabetes mellitus without mention of complication, not stated as uncontrolled     Wears glasses       Reviewed all health maintenance requirements and ordered appropriate tests  There are no preventive care reminders to display for this patient. Past Surgical History:     Past Surgical History:   Procedure Laterality Date    TUBAL LIGATION      WISDOM TOOTH EXTRACTION          Medications:       Prior to Admission medications    Medication Sig Start Date End Date Taking?  Authorizing Provider   levocetirizine (XYZAL) 5 MG tablet Take 1 tablet by mouth nightly 5/22/19  Yes Willy Mortensen Might, NINA - CNP   amitriptyline (ELAVIL) 25 MG tablet Take 1 tablet by mouth nightly 5/22/19  Yes Willy Mortensen Might, APRN - CNP   montelukast (SINGULAIR) 10 MG tablet Take 1 tablet by mouth daily 5/14/19  Yes Willy Mortensen Might, APRN - LINN   albuterol sulfate HFA (VENTOLIN HFA) 108 (90 Base) MCG/ACT inhaler Inhale 2 puffs into the lungs every 6 hours as needed for Wheezing 5/14/19  Yes Willy Mortensen Might, APRN - CNP   Ertugliflozin L-PyroglutamicAc (STEGLATRO) 15 MG TABS Take 1 tablet by mouth daily 2/18/19  Yes Willy Mortensen Might, NINA - CNP   atorvastatin (LIPITOR) 20 MG tablet TAKE 1 TABLET BY MOUTH ONCE DAILY 1/7/19  Yes Willy Mortensen Might, NINA - CNP   venlafaxine (EFFEXOR XR) 150 MG extended release capsule TAKE 1 CAPSULE BY MOUTH ONCE DAILY 1/7/19  Yes Willy Mortensen Might, NINA - CNP   lisinopril (PRINIVIL;ZESTRIL) 2.5 MG tablet TAKE 1 TABLET BY MOUTH ONCE DAILY 11/7/18  Yes Willy Mortensen Might, NINA - CNP   venlafaxine (EFFEXOR XR) 75 MG extended release capsule Take 1 capsule by mouth daily 10/9/18  Yes Willy Johnson, NINA - CNP   blood glucose test strips (TRUE METRIX BLOOD GLUCOSE TEST) strip 1 each by In Vitro route daily As needed. 9/6/18  Yes Willy Mortensen Might, NINA - LINN   linagliptin-metformin (JENTADUETO) 2.5-1000 MG TABS TAKE ONE TABLET BY MOUTH TWICE DAILY 8/10/18  Yes Willy Johnson, NINA - LINN   glucose monitoring kit (FREESTYLE) monitoring kit 1 kit by Does not apply route daily Substitute as needed for insurance requirements. 8/10/18  Yes Willy Johnson, NINA Pack CNP   Lancets MISC Substitute as needed for insurance requirements. Dx 250.00, testing daily. 4/4/17 12/18/18  Willy Johnson, NINA Pack CNP        Allergies:       Patient has no known allergies. Social History:     Tobacco:    reports that she has been smoking cigarettes. She has been smoking about 0.25 packs per day. She has never used smokeless tobacco.  Alcohol:      reports that she drinks alcohol.   Drug Use:  reports that she does not use drugs. Family History:     Family History   Adopted: Yes       Review of Systems:     Positive and Negative as described in HPI    Review of Systems   Constitutional: Negative for chills, fatigue and fever. HENT: Positive for congestion, rhinorrhea, sneezing and sore throat. Negative for ear pain, sinus pain and trouble swallowing. Eyes: Negative for visual disturbance. Respiratory: Positive for cough. Negative for shortness of breath and wheezing. Cardiovascular: Negative for chest pain and palpitations. Gastrointestinal: Negative for abdominal pain, constipation, diarrhea, nausea and vomiting. Endocrine: Negative for polydipsia, polyphagia and polyuria. Genitourinary: Negative for difficulty urinating and dysuria. Musculoskeletal: Negative for gait problem, joint pain, myalgias and neck pain. Skin: Negative for rash. Neurological: Negative for dizziness, syncope, light-headedness and headaches. Psychiatric/Behavioral: Positive for sleep disturbance. The patient is nervous/anxious. Physical Exam:   Vitals:  /74 (Site: Right Upper Arm, Position: Sitting, Cuff Size: Large Adult)   Pulse 74   Temp 97.2 °F (36.2 °C) (Temporal)   Resp 14   Ht 5' 6\" (1.676 m)   Wt 227 lb 3.2 oz (103.1 kg)   LMP 04/30/2019   BMI 36.67 kg/m²     Physical Exam   Constitutional: She is oriented to person, place, and time. She appears well-developed and well-nourished. No distress. Over nourished   HENT:   Nose: Mucosal edema present. No rhinorrhea. Mouth/Throat: Mucous membranes are normal. Mucous membranes are not dry. Posterior oropharyngeal erythema present. Mild post nasal drip   Eyes: Conjunctivae are normal.   Neck: Normal range of motion. Neck supple. Cardiovascular: Normal rate, regular rhythm and normal heart sounds. Pulmonary/Chest: Effort normal and breath sounds normal. She has no wheezes. Abdominal: Soft. Bowel sounds are normal. She exhibits no distension.  There is no tenderness. Musculoskeletal: She exhibits no edema. Lymphadenopathy:     She has no cervical adenopathy. Neurological: She is alert and oriented to person, place, and time. Skin: Skin is warm and dry. No rash noted. Psychiatric: She has a normal mood and affect. Her behavior is normal. Her mood appears not anxious. She does not exhibit a depressed mood. Nursing note and vitals reviewed. Data:     Lab Results   Component Value Date     05/15/2019    K 4.0 05/15/2019     05/15/2019    CO2 22 05/15/2019    BUN 14 05/15/2019    CREATININE 0.48 05/15/2019    GLUCOSE 156 05/15/2019    PROT 8.4 05/15/2019    LABALBU 4.4 05/15/2019    BILITOT 0.25 05/15/2019    ALKPHOS 84 05/15/2019    AST 11 05/15/2019    ALT 19 05/15/2019     Lab Results   Component Value Date    WBC 13.5 05/15/2019    RBC 4.85 05/15/2019    HGB 14.5 05/15/2019    HCT 44.7 05/15/2019    MCV 92.2 05/15/2019    MCH 29.9 05/15/2019    MCHC 32.4 05/15/2019    RDW 12.5 05/15/2019     05/15/2019    MPV 8.6 05/15/2019     Lab Results   Component Value Date    TSH 2.99 11/25/2014     Lab Results   Component Value Date    CHOL 146 05/15/2019    HDL 45 05/15/2019    LABA1C 7.6 05/15/2019       Assessment/Plan:      Diagnosis Orders   1. Controlled type 2 diabetes mellitus without complication, without long-term current use of insulin ([facility])  Comprehensive Metabolic Panel    Hemoglobin A1C    Microalbumin, Ur   2. Dyslipidemia     3. Chronic allergic rhinitis  levocetirizine (XYZAL) 5 MG tablet   4. Primary insomnia  amitriptyline (ELAVIL) 25 MG tablet       1. Claire Coates received counseling on the following healthy behaviors: nutrition, exercise and medication adherence  2. Patient given educational materials - see patient instructions  3. Was a self-tracking handout given in paper form or via [employer]? No  If yes, see orders or list here. 4.  Discussed use, benefit, and side effects of prescribed medications.   Barriers to medication compliance addressed. All patient questions answered. Pt voiced understanding. 5.  Reviewed prior labs and health maintenance  6. Continue current medications, diet and exercise. Completed Refills   Requested Prescriptions     Signed Prescriptions Disp Refills    levocetirizine (XYZAL) 5 MG tablet 90 tablet 3     Sig: Take 1 tablet by mouth nightly    amitriptyline (ELAVIL) 25 MG tablet 90 tablet 1     Sig: Take 1 tablet by mouth nightly         Return in about 6 months (around 11/22/2019) for check up. Quality & Risk Score Accuracy    Visit Dx:  E11.9 - Controlled type 2 diabetes mellitus without complication, without long-term current use of insulin ([facility])  Assessment and plan:  Stable based upon symptoms and exam. Continue current treatment plan and follow up at least yearly. Visit Dx:  J30.9 - Chronic allergic rhinitis  Assessment and plan:  Stable based upon symptoms and exam. Continue current treatment plan and follow up at least yearly.   Last edited 05/22/19 10:31 EDT by NINA Goodman - CNP

## 2019-05-22 NOTE — PATIENT INSTRUCTIONS
sugar levels. A registered dietitian or certified diabetes educator can help you plan how many carbs to include in each meal and snack. For most adults, a guideline for the daily amount of carbs is:  · 45 to 60 grams at each meal. That's about the same as 3 to 4 carbohydrate servings. · 15 to 20 grams at each snack. That's about the same as 1 carbohydrate serving. Count carbs  Counting carbs lets you know how much rapid-acting insulin to take before you eat. If you use an insulin pump, you get a constant rate of insulin during the day. So the pump must be programmed at meals. This gives you extra insulin to cover the rise in blood sugar after meals. If you take insulin:  · Learn your own insulin-to-carb ratio. You and your diabetes health professional will figure out the ratio. You can do this by testing your blood sugar after meals. For example, you may need a certain amount of insulin for every 15 grams of carbs. · Add up the carb grams in a meal. Then you can figure out how many units of insulin to take based on your insulin-to-carb ratio. · Exercise lowers blood sugar. You can use less insulin than you would if you were not doing exercise. Keep in mind that timing matters. If you exercise within 1 hour after a meal, your body may need less insulin for that meal than it would if you exercised 3 hours after the meal. Test your blood sugar to find out how exercise affects your need for insulin. If you do or don't take insulin:  · Look at labels on packaged foods. This can tell you how many carbs are in a serving. You can also use guides from the American Diabetes Association. · Be aware of portions, or serving sizes. If a package has two servings and you eat the whole package, you need to double the number of grams of carbohydrate listed for one serving. · Protein, fat, and fiber do not raise blood sugar as much as carbs do.  If you eat a lot of these nutrients in a meal, your blood sugar will rise more slowly than it would otherwise. Eat from all food groups  · Eat at least three meals a day. · Plan meals to include food from all the food groups. The food groups include grains, fruits, dairy, proteins, and vegetables. · Talk to your dietitian or diabetes educator about ways to add limited amounts of sweets into your meal plan. · If you drink alcohol, talk to your doctor. It may not be recommended when you are taking certain diabetes medicines. Where can you learn more? Go to https://YinYangMap.BrandProject. org and sign in to your [employer] account. Enter P652 in the N[employer]irt box to learn more about \"Counting Carbohydrates: Care Instructions. \"     If you do not have an account, please click on the \"Sign Up Now\" link. Current as of: July 25, 2018  Content Version: 12.0  © 8686-9422 Healthwise, Incorporated. Care instructions adapted under license by [facility] ([facility]). If you have questions about a medical condition or this instruction, always ask your healthcare professional. [place] [address] any warranty or liability for your use of this information.

## 2019-06-02 ENCOUNTER — HOSPITAL ENCOUNTER (EMERGENCY)
Age: 34
Discharge: HOME | End: 2019-06-02
Attending: EMERGENCY MEDICINE
Payer: COMMERCIAL

## 2019-06-02 ENCOUNTER — APPOINTMENT (OUTPATIENT)
Dept: GENERAL RADIOLOGY | Age: 34
End: 2019-06-02
Payer: COMMERCIAL

## 2019-06-02 ENCOUNTER — APPOINTMENT (OUTPATIENT)
Dept: CT IMAGING | Age: 34
End: 2019-06-02
Payer: COMMERCIAL

## 2019-06-02 VITALS
BODY MASS INDEX: 36.64 KG/M2 | WEIGHT: 227 LBS | TEMPERATURE: 97.9 F | RESPIRATION RATE: 16 BRPM | OXYGEN SATURATION: 96 % | HEART RATE: 73 BPM | DIASTOLIC BLOOD PRESSURE: 75 MMHG | SYSTOLIC BLOOD PRESSURE: 121 MMHG

## 2019-06-02 VITALS
DIASTOLIC BLOOD PRESSURE: 83 MMHG | RESPIRATION RATE: 16 BRPM | TEMPERATURE: 97 F | OXYGEN SATURATION: 97 % | HEART RATE: 85 BPM | SYSTOLIC BLOOD PRESSURE: 131 MMHG

## 2019-06-02 DIAGNOSIS — S22.31XA CLOSED FRACTURE OF ONE RIB OF RIGHT SIDE, INITIAL ENCOUNTER: ICD-10-CM

## 2019-06-02 DIAGNOSIS — R07.81 RIB PAIN ON RIGHT SIDE: Primary | ICD-10-CM

## 2019-06-02 DIAGNOSIS — R07.89 RIGHT-SIDED CHEST WALL PAIN: Primary | ICD-10-CM

## 2019-06-02 LAB
ABSOLUTE EOS #: 0.3 K/UL (ref 0–0.44)
ABSOLUTE IMMATURE GRANULOCYTE: 0.05 K/UL (ref 0–0.3)
ABSOLUTE LYMPH #: 2.49 K/UL (ref 1.1–3.7)
ABSOLUTE MONO #: 0.55 K/UL (ref 0.1–1.2)
ALBUMIN SERPL-MCNC: 3.8 G/DL (ref 3.5–5.2)
ALBUMIN/GLOBULIN RATIO: 1.2 (ref 1–2.5)
ALP BLD-CCNC: 84 U/L (ref 35–104)
ALT SERPL-CCNC: 20 U/L (ref 5–33)
ANION GAP SERPL CALCULATED.3IONS-SCNC: 11 MMOL/L (ref 9–17)
AST SERPL-CCNC: 12 U/L
BASOPHILS # BLD: 1 % (ref 0–2)
BASOPHILS ABSOLUTE: 0.06 K/UL (ref 0–0.2)
BILIRUB SERPL-MCNC: 0.19 MG/DL (ref 0.3–1.2)
BUN BLDV-MCNC: 8 MG/DL (ref 6–20)
BUN/CREAT BLD: 17 (ref 9–20)
CALCIUM SERPL-MCNC: 8.6 MG/DL (ref 8.6–10.4)
CHLORIDE BLD-SCNC: 100 MMOL/L (ref 98–107)
CO2: 24 MMOL/L (ref 20–31)
CREAT SERPL-MCNC: 0.46 MG/DL (ref 0.5–0.9)
DIFFERENTIAL TYPE: ABNORMAL
EOSINOPHILS RELATIVE PERCENT: 4 % (ref 1–4)
GFR AFRICAN AMERICAN: >60 ML/MIN
GFR NON-AFRICAN AMERICAN: >60 ML/MIN
GFR SERPL CREATININE-BSD FRML MDRD: ABNORMAL ML/MIN/{1.73_M2}
GFR SERPL CREATININE-BSD FRML MDRD: ABNORMAL ML/MIN/{1.73_M2}
GLUCOSE BLD-MCNC: 160 MG/DL (ref 70–99)
HCT VFR BLD CALC: 40.8 % (ref 36.3–47.1)
HEMOGLOBIN: 12.8 G/DL (ref 11.9–15.1)
IMMATURE GRANULOCYTES: 1 %
LIPASE: 32 U/L (ref 13–60)
LYMPHOCYTES # BLD: 30 % (ref 24–43)
MCH RBC QN AUTO: 29.2 PG (ref 25.2–33.5)
MCHC RBC AUTO-ENTMCNC: 31.4 G/DL (ref 28.4–34.8)
MCV RBC AUTO: 93.2 FL (ref 82.6–102.9)
MONOCYTES # BLD: 7 % (ref 3–12)
NRBC AUTOMATED: 0 PER 100 WBC
PDW BLD-RTO: 12.4 % (ref 11.8–14.4)
PLATELET # BLD: 389 K/UL (ref 138–453)
PLATELET ESTIMATE: ABNORMAL
PMV BLD AUTO: 8.6 FL (ref 8.1–13.5)
POTASSIUM SERPL-SCNC: 4 MMOL/L (ref 3.7–5.3)
RBC # BLD: 4.38 M/UL (ref 3.95–5.11)
RBC # BLD: ABNORMAL 10*6/UL
SEG NEUTROPHILS: 57 % (ref 36–65)
SEGMENTED NEUTROPHILS ABSOLUTE COUNT: 4.95 K/UL (ref 1.5–8.1)
SODIUM BLD-SCNC: 135 MMOL/L (ref 135–144)
TOTAL PROTEIN: 7.1 G/DL (ref 6.4–8.3)
WBC # BLD: 8.4 K/UL (ref 3.5–11.3)
WBC # BLD: ABNORMAL 10*3/UL

## 2019-06-02 PROCEDURE — 36415 COLL VENOUS BLD VENIPUNCTURE: CPT

## 2019-06-02 PROCEDURE — 71101 X-RAY EXAM UNILAT RIBS/CHEST: CPT

## 2019-06-02 PROCEDURE — 83690 ASSAY OF LIPASE: CPT

## 2019-06-02 PROCEDURE — 96374 THER/PROPH/DIAG INJ IV PUSH: CPT

## 2019-06-02 PROCEDURE — 99283 EMERGENCY DEPT VISIT LOW MDM: CPT

## 2019-06-02 PROCEDURE — 80053 COMPREHEN METABOLIC PANEL: CPT

## 2019-06-02 PROCEDURE — 6360000002 HC RX W HCPCS: Performed by: EMERGENCY MEDICINE

## 2019-06-02 PROCEDURE — 85025 COMPLETE CBC W/AUTO DIFF WBC: CPT

## 2019-06-02 PROCEDURE — 71250 CT THORAX DX C-: CPT

## 2019-06-02 PROCEDURE — 6370000000 HC RX 637 (ALT 250 FOR IP): Performed by: EMERGENCY MEDICINE

## 2019-06-02 RX ORDER — KETOROLAC TROMETHAMINE 10 MG/1
10 TABLET, FILM COATED ORAL EVERY 8 HOURS PRN
Qty: 15 TABLET | Refills: 0 | Status: SHIPPED | OUTPATIENT
Start: 2019-06-02 | End: 2019-06-05 | Stop reason: ALTCHOICE

## 2019-06-02 RX ORDER — HYDROCODONE BITARTRATE AND ACETAMINOPHEN 5; 325 MG/1; MG/1
1 TABLET ORAL ONCE
Status: COMPLETED | OUTPATIENT
Start: 2019-06-02 | End: 2019-06-02

## 2019-06-02 RX ORDER — KETOROLAC TROMETHAMINE 15 MG/ML
15 INJECTION, SOLUTION INTRAMUSCULAR; INTRAVENOUS ONCE
Status: COMPLETED | OUTPATIENT
Start: 2019-06-02 | End: 2019-06-02

## 2019-06-02 RX ORDER — HYDROCODONE BITARTRATE AND ACETAMINOPHEN 5; 325 MG/1; MG/1
1 TABLET ORAL EVERY 6 HOURS PRN
Qty: 20 TABLET | Refills: 0 | Status: SHIPPED | OUTPATIENT
Start: 2019-06-02 | End: 2019-06-10 | Stop reason: SDUPTHER

## 2019-06-02 RX ORDER — FENTANYL CITRATE 50 UG/ML
100 INJECTION, SOLUTION INTRAMUSCULAR; INTRAVENOUS ONCE
Status: COMPLETED | OUTPATIENT
Start: 2019-06-02 | End: 2019-06-02

## 2019-06-02 RX ADMIN — HYDROCODONE BITARTRATE AND ACETAMINOPHEN 1 TABLET: 5; 325 TABLET ORAL at 08:24

## 2019-06-02 RX ADMIN — FENTANYL CITRATE 100 MCG: 50 INJECTION INTRAMUSCULAR; INTRAVENOUS at 22:10

## 2019-06-02 RX ADMIN — KETOROLAC TROMETHAMINE 15 MG: 15 INJECTION, SOLUTION INTRAMUSCULAR; INTRAVENOUS at 08:25

## 2019-06-02 ASSESSMENT — PAIN DESCRIPTION - PAIN TYPE
TYPE: ACUTE PAIN

## 2019-06-02 ASSESSMENT — PAIN DESCRIPTION - ORIENTATION
ORIENTATION: RIGHT

## 2019-06-02 ASSESSMENT — PAIN DESCRIPTION - DESCRIPTORS
DESCRIPTORS: SHARP;RADIATING;DISCOMFORT
DESCRIPTORS: ACHING
DESCRIPTORS: SHARP;SHOOTING

## 2019-06-02 ASSESSMENT — PAIN SCALES - GENERAL
PAINLEVEL_OUTOF10: 9
PAINLEVEL_OUTOF10: 3
PAINLEVEL_OUTOF10: 5
PAINLEVEL_OUTOF10: 4
PAINLEVEL_OUTOF10: 6
PAINLEVEL_OUTOF10: 9

## 2019-06-02 ASSESSMENT — PAIN DESCRIPTION - LOCATION
LOCATION: RIB CAGE

## 2019-06-02 ASSESSMENT — PAIN DESCRIPTION - FREQUENCY
FREQUENCY: INTERMITTENT
FREQUENCY: CONTINUOUS

## 2019-06-02 NOTE — ED PROVIDER NOTES
[facility] FACILITY ED  [address] [facility]   Chief Complaint   Patient presents with    Rib Pain     ongoing for 3 weeks; right sided; worse when coughing, sneezing, and movement      HPI   Fransico Maria is a 34 y.o. female who presents with lower right rib pain for 3 weeks. She has been seen for it but it continues. She smokes about a pack a week. No vomiting or sweating. No SOB , worse pain with deep breathing, likely just due to further excursion of the rib. Pain over the ribs anteriorly on the right , worse with touching a lot. REVIEW OF SYSTEMS   Cardiac: +Chest Pain, Denies syncope  Respiratory: Denies cough or hemoptysis  GI: Denies Vomiting or Diarrhea  General: Denies Fever   All other review of systems otherwise negative.    PAST MEDICAL & SURGICAL HISTORY   Past Medical History:   Diagnosis Date    Anxiety and depression     Depression     Female stress incontinence 11/23/2015    Obesity     Type II or unspecified type diabetes mellitus without mention of complication, not stated as uncontrolled     Wears glasses      Past Surgical History:   Procedure Laterality Date    TUBAL LIGATION      WISDOM TOOTH EXTRACTION        CURRENT MEDICATIONS   Current Outpatient Rx   Medication Sig Dispense Refill    levocetirizine (XYZAL) 5 MG tablet Take 1 tablet by mouth nightly 90 tablet 3    amitriptyline (ELAVIL) 25 MG tablet Take 1 tablet by mouth nightly 90 tablet 1    montelukast (SINGULAIR) 10 MG tablet Take 1 tablet by mouth daily 30 tablet 3    albuterol sulfate HFA (VENTOLIN HFA) 108 (90 Base) MCG/ACT inhaler Inhale 2 puffs into the lungs every 6 hours as needed for Wheezing 1 Inhaler 3    Ertugliflozin L-PyroglutamicAc (STEGLATRO) 15 MG TABS Take 1 tablet by mouth daily 90 tablet 1    atorvastatin (LIPITOR) 20 MG tablet TAKE 1 TABLET BY MOUTH ONCE DAILY 90 tablet 3    venlafaxine (EFFEXOR XR) 150 MG extended release capsule TAKE 1 CAPSULE BY MOUTH ONCE DAILY 90 capsule 3    lisinopril (PRINIVIL;ZESTRIL) 2.5 MG tablet TAKE 1 TABLET BY MOUTH ONCE DAILY 90 tablet 3    venlafaxine (EFFEXOR XR) 75 MG extended release capsule Take 1 capsule by mouth daily 90 capsule 3    blood glucose test strips (TRUE METRIX BLOOD GLUCOSE TEST) strip 1 each by In Vitro route daily As needed. 100 each 3    linagliptin-metformin (JENTADUETO) 2.5-1000 MG TABS TAKE ONE TABLET BY MOUTH TWICE DAILY 60 tablet 11    glucose monitoring kit (FREESTYLE) monitoring kit 1 kit by Does not apply route daily Substitute as needed for insurance requirements. 1 kit 0    Lancets MISC Substitute as needed for insurance requirements. Dx 250.00, testing daily.  100 each 3      ALLERGIES   No Known Allergies   SOCIAL & FAMILY HISTORY   Social History     Socioeconomic History    Marital status:      Spouse name: None    Number of children: None    Years of education: None    Highest education level: None   Occupational History    None   Social Needs    Financial resource strain: None    Food insecurity:     Worry: None     Inability: None    Transportation needs:     Medical: None     Non-medical: None   Tobacco Use    Smoking status: Current Some Day Smoker     Packs/day: 0.25     Types: Cigarettes    Smokeless tobacco: Never Used    Tobacco comment: 1-2 cigs a day   Substance and Sexual Activity    Alcohol use: Yes     Comment: rarely    Drug use: No    Sexual activity: Yes   Lifestyle    Physical activity:     Days per week: None     Minutes per session: None    Stress: None   Relationships    Social connections:     Talks on phone: None     Gets together: None     Attends Christian service: None     Active member of club or organization: None     Attends meetings of clubs or organizations: None     Relationship status: None    Intimate partner violence:     Fear of current or ex partner: None     Emotionally abused: None     Physically abused: None     Forced sexual activity: None   Other Topics Concern    None   Social History Narrative    None     Family History   Adopted: Yes      PHYSICAL EXAM   VITAL SIGNS: /83   Pulse 85   Temp 97 °F (36.1 °C) (Tympanic)   Resp 16   LMP 05/30/2019   SpO2 97%    Constitutional: Well developed, well nourished, no acute distress   HENT: Atraumatic, moist mucus membranes  Neck: supple, no JVD   Respiratory: Lungs Clear, no retractions   Cardiovascular: Reg rate and rhythm   Vascular: Radial pulses 2+ equal bilaterally  GI: Soft, nontender, normal bowel sounds. For the most part RUQ seems fine  Musculoskeletal: No edema, no deformities, pain over anterior inferior right side rib. Integument: Skin warm and dry, no petechiae   Neurologic: Alert & oriented, normal speech  Psych: Pleasant affect, no hallucinations       RADIOLOGY/PROCEDURES   XR RIBS RIGHT INCLUDE CHEST (MIN 3 VIEWS)   Final Result   Subtle acute minimally displaced right posterior 7th rib fracture. No acute   cardiopulmonary findings. ED COURSE & MEDICAL DECISION MAKING   Pertinent Labs & Imaging studies reviewed and interpreted. (See chart for details)  See chart for details of medications given during the ED stay. Vitals:    06/02/19 0727   BP: 131/83   Pulse: 85   Resp: 16   Temp: 97 °F (36.1 °C)   SpO2: 97%       Differential Diagnosis: Rib sprain, RUQ issue    MDM: I do not think patient has PE. She is Perc negative. Exam suggests msk issue    FINAL IMPRESSION   1.  Right-sided chest wall pain        Plan: Pt signed out to Dr Gagan Miles ending work up  Electronically signed by: Kb Bradford MD, 6/2/2019 8:12 AM  (This note was completed with a voice recognition program)              Kb Bradford MD  06/02/19 0643

## 2019-06-02 NOTE — ED PROVIDER NOTES
Addendum: Patient feeling better with pain medicine and labs unremarkable and chest x-ray showed subtle right rib fracture consistent with where her pain is      Partklarissa Mason MD  06/02/19 8050

## 2019-06-03 NOTE — ED NOTES
Patient notified that CT is back and once reviewed by physician he will be in. Patient denies needs at this time.       Yaneth Holland RN  06/02/19 1939

## 2019-06-03 NOTE — ED PROVIDER NOTES
[facility] ED  [facility]   Chief Complaint   Patient presents with    Rib Pain     right sided, seen earlier today, was getting dressed and felt a \"snap\", states pain is a 9/10      HPI   Danny Quiros is a 34 y.o. female who presents with right side chest pain, onset was a few days ago. She was seen here this am and noted to have a rib fracture on cxr. This evening she felt a pop after bending over and then had more pain along the lower right front ribs . The duration has been constant. The pain was a 10  /10. No exertional symptoms. REVIEW OF SYSTEMS   Cardiac: +Chest Pain, Denies syncope  Respiratory: Denies cough or hemoptysis  GI: Denies Vomiting or Diarrhea  General: Denies Fever   All other review of systems otherwise negative. PAST MEDICAL & SURGICAL HISTORY   Past Medical History:   Diagnosis Date    Anxiety and depression     Depression     Female stress incontinence 11/23/2015    Obesity     Type II or unspecified type diabetes mellitus without mention of complication, not stated as uncontrolled     Wears glasses      Past Surgical History:   Procedure Laterality Date    TUBAL LIGATION      WISDOM TOOTH EXTRACTION        CURRENT MEDICATIONS   Current Outpatient Rx   Medication Sig Dispense Refill    ketorolac (TORADOL) 10 MG tablet Take 1 tablet by mouth every 8 hours as needed for Pain 15 tablet 0    HYDROcodone-acetaminophen (NORCO) 5-325 MG per tablet Take 1 tablet by mouth every 6 hours as needed for Pain for up to 5 days.  20 tablet 0    levocetirizine (XYZAL) 5 MG tablet Take 1 tablet by mouth nightly 90 tablet 3    amitriptyline (ELAVIL) 25 MG tablet Take 1 tablet by mouth nightly 90 tablet 1    montelukast (SINGULAIR) 10 MG tablet Take 1 tablet by mouth daily 30 tablet 3    albuterol sulfate HFA (VENTOLIN HFA) 108 (90 Base) MCG/ACT inhaler Inhale 2 puffs into the lungs every 6 hours as needed for Wheezing 1 Inhaler 3    Ertugliflozin L-PyroglutamicAc (STEGLATRO) 15 MG TABS Take 1 tablet by mouth daily 90 tablet 1    atorvastatin (LIPITOR) 20 MG tablet TAKE 1 TABLET BY MOUTH ONCE DAILY 90 tablet 3    venlafaxine (EFFEXOR XR) 150 MG extended release capsule TAKE 1 CAPSULE BY MOUTH ONCE DAILY 90 capsule 3    lisinopril (PRINIVIL;ZESTRIL) 2.5 MG tablet TAKE 1 TABLET BY MOUTH ONCE DAILY 90 tablet 3    venlafaxine (EFFEXOR XR) 75 MG extended release capsule Take 1 capsule by mouth daily 90 capsule 3    linagliptin-metformin (JENTADUETO) 2.5-1000 MG TABS TAKE ONE TABLET BY MOUTH TWICE DAILY 60 tablet 11    blood glucose test strips (TRUE METRIX BLOOD GLUCOSE TEST) strip 1 each by In Vitro route daily As needed. 100 each 3    glucose monitoring kit (FREESTYLE) monitoring kit 1 kit by Does not apply route daily Substitute as needed for insurance requirements. 1 kit 0    Lancets MISC Substitute as needed for insurance requirements. Dx 250.00, testing daily.  100 each 3      ALLERGIES   No Known Allergies   SOCIAL & FAMILY HISTORY   Social History     Socioeconomic History    Marital status:      Spouse name: None    Number of children: None    Years of education: None    Highest education level: None   Occupational History    None   Social Needs    Financial resource strain: None    Food insecurity:     Worry: None     Inability: None    Transportation needs:     Medical: None     Non-medical: None   Tobacco Use    Smoking status: Current Some Day Smoker     Packs/day: 0.25     Types: Cigarettes    Smokeless tobacco: Never Used    Tobacco comment: 1-2 cigs a day   Substance and Sexual Activity    Alcohol use: Yes     Comment: rarely    Drug use: No    Sexual activity: Yes   Lifestyle    Physical activity:     Days per week: None     Minutes per session: None    Stress: None   Relationships    Social connections:     Talks on phone: None     Gets together: None     Attends Yazdanism service: None     Active member of club or organization: None     Attends meetings of clubs or organizations: None     Relationship status: None    Intimate partner violence:     Fear of current or ex partner: None     Emotionally abused: None     Physically abused: None     Forced sexual activity: None   Other Topics Concern    None   Social History Narrative    None     Family History   Adopted: Yes      PHYSICAL EXAM   VITAL SIGNS: /75   Pulse 73   Temp 97.9 °F (36.6 °C) (Tympanic)   Resp 16   Wt 227 lb (103 kg)   LMP 05/30/2019   SpO2 96%   BMI 36.64 kg/m²    Constitutional: Well developed, well nourished, no acute distress   HENT: Atraumatic, moist mucus membranes  Neck: supple, no JVD   Respiratory: Lungs Clear, no retractions   Cardiovascular: Reg rate and rhythm   Vascular: Radial pulses 2+ equal bilaterally  GI: Soft, nontender, normal bowel sounds  Musculoskeletal: No edema, no deformities, severe discomfort with light rib palpation  Integument: Skin warm and dry, no petechiae   Neurologic: Alert & oriented, normal speech  Psych: Pleasant affect, no hallucinations       RADIOLOGY/PROCEDURES   CT CHEST WO CONTRAST   Final Result   Healing fracture of the right lateral 7th rib. ED COURSE & MEDICAL DECISION MAKING   Pertinent Labs & Imaging studies reviewed and interpreted. (See chart for details)  See chart for details of medications given during the ED stay. Vitals:    06/02/19 2256   BP: 121/75   Pulse: 73   Resp:    Temp:    SpO2:        Differential Diagnosis: Acute Coronary Syndrome, Congestive Heart Failure, Myocardial Infarction, Pulmonary Embolus, Thoracic Dissection, Pneumonia, Pneumothorax, other. FINAL IMPRESSION   1.  Rib pain on right side        Plan: discharge to home  Electronically signed by: Mina Menezes MD, 6/2/2019 11:27 PM  (This note was completed with a voice recognition program)              Mina Menezes MD  06/02/19 7300

## 2019-06-04 ENCOUNTER — TELEPHONE (OUTPATIENT)
Dept: PRIMARY CARE CLINIC | Age: 34
End: 2019-06-04

## 2019-06-05 ENCOUNTER — OFFICE VISIT (OUTPATIENT)
Dept: PRIMARY CARE CLINIC | Age: 34
End: 2019-06-05
Payer: COMMERCIAL

## 2019-06-05 VITALS
SYSTOLIC BLOOD PRESSURE: 116 MMHG | HEIGHT: 66 IN | DIASTOLIC BLOOD PRESSURE: 60 MMHG | RESPIRATION RATE: 16 BRPM | BODY MASS INDEX: 36.8 KG/M2 | TEMPERATURE: 97.2 F | HEART RATE: 71 BPM | WEIGHT: 229 LBS

## 2019-06-05 DIAGNOSIS — J41.1 BRONCHITIS, MUCOPURULENT RECURRENT (HCC): ICD-10-CM

## 2019-06-05 DIAGNOSIS — J45.909 ACUTE BRONCHITIS WITH ASTHMA: Primary | ICD-10-CM

## 2019-06-05 DIAGNOSIS — S22.31XS CLOSED FRACTURE OF ONE RIB OF RIGHT SIDE, SEQUELA: ICD-10-CM

## 2019-06-05 DIAGNOSIS — J20.9 ACUTE BRONCHITIS WITH ASTHMA: Primary | ICD-10-CM

## 2019-06-05 PROCEDURE — 99214 OFFICE O/P EST MOD 30 MIN: CPT | Performed by: NURSE PRACTITIONER

## 2019-06-05 PROCEDURE — G8427 DOCREV CUR MEDS BY ELIG CLIN: HCPCS | Performed by: NURSE PRACTITIONER

## 2019-06-05 PROCEDURE — 4004F PT TOBACCO SCREEN RCVD TLK: CPT | Performed by: NURSE PRACTITIONER

## 2019-06-05 PROCEDURE — 3023F SPIROM DOC REV: CPT | Performed by: NURSE PRACTITIONER

## 2019-06-05 PROCEDURE — G8926 SPIRO NO PERF OR DOC: HCPCS | Performed by: NURSE PRACTITIONER

## 2019-06-05 PROCEDURE — G8417 CALC BMI ABV UP PARAM F/U: HCPCS | Performed by: NURSE PRACTITIONER

## 2019-06-05 RX ORDER — BENZONATATE 200 MG/1
200 CAPSULE ORAL 3 TIMES DAILY PRN
Qty: 30 CAPSULE | Refills: 0 | Status: SHIPPED | OUTPATIENT
Start: 2019-06-05 | End: 2019-06-12

## 2019-06-05 RX ORDER — PREDNISONE 20 MG/1
40 TABLET ORAL DAILY
Qty: 10 TABLET | Refills: 0 | Status: SHIPPED | OUTPATIENT
Start: 2019-06-05 | End: 2019-06-10

## 2019-06-05 RX ORDER — BACLOFEN 10 MG/1
10 TABLET ORAL 3 TIMES DAILY
Qty: 30 TABLET | Refills: 0 | Status: SHIPPED | OUTPATIENT
Start: 2019-06-05 | End: 2019-06-12 | Stop reason: SDUPTHER

## 2019-06-05 RX ORDER — DOXYCYCLINE HYCLATE 100 MG/1
100 CAPSULE ORAL 2 TIMES DAILY
Qty: 20 CAPSULE | Refills: 0 | Status: SHIPPED | OUTPATIENT
Start: 2019-06-05 | End: 2019-06-15

## 2019-06-05 ASSESSMENT — ENCOUNTER SYMPTOMS
SHORTNESS OF BREATH: 0
HEMOPTYSIS: 0
DIARRHEA: 0
ABDOMINAL PAIN: 0
NAUSEA: 0
CONSTIPATION: 0
VOMITING: 0
SORE THROAT: 1
WHEEZING: 0
RHINORRHEA: 0
BACK PAIN: 0
COUGH: 1

## 2019-06-05 NOTE — PROGRESS NOTES
Name: Dong Anderson  : 1985         Chief Complaint:     Chief Complaint   Patient presents with    ED Follow-up     States \"I was in the ER x 2 on  and was told I have a fractured rib. \" C/o right rib pain.  Chest Pain     States \"I coughed so hard I broke a rib. \" Continues to c/o cough. History of Present Illness:      Dong Anderson is a 34 y.o.  female who presents with ED Follow-up (States \"I was in the ER x 2 on  and was told I have a fractured rib. \" C/o right rib pain. ) and Chest Pain (States \"I coughed so hard I broke a rib. \" Continues to c/o cough. )      Debbie Sen is here today for an ER follow up visit. Sustained right lower rib fracture from coughing, subtle, non- displaced    Chest Pain    This is a new problem. The current episode started in the past 7 days. The onset quality is sudden. The problem occurs intermittently. The problem has been gradually improving. The pain is present in the lateral region. The pain is at a severity of 5/10. The pain is moderate. The quality of the pain is described as sharp. The pain does not radiate. Associated symptoms include a cough. Pertinent negatives include no abdominal pain, back pain, diaphoresis, dizziness, fever, headaches, hemoptysis, malaise/fatigue, nausea, palpitations, shortness of breath, syncope, vomiting or weakness. The cough's precipitants include activity. The cough is productive. There is no color change associated with the cough. The cough is relieved by one or more prescription drugs. The cough is worsened by activity. The pain is aggravated by breathing, movement and lifting. She has tried NSAIDs, rest and analgesics (NORCO, TORADOL, HEAT, ICE) for the symptoms. The treatment provided mild relief. Risk factors include lack of exercise, smoking/tobacco exposure, stress and obesity. Her past medical history is significant for anxiety/panic attacks and diabetes.    Pertinent negatives for past medical history include no CAD and no MI. Prior diagnostic workup includes chest x-ray. Asthma   She complains of cough. There is no hemoptysis, shortness of breath or wheezing. This is a chronic problem. The current episode started more than 1 year ago. The problem occurs intermittently. The problem has been unchanged. The cough is productive of sputum. Associated symptoms include chest pain, nasal congestion, postnasal drip and a sore throat. Pertinent negatives include no ear congestion, ear pain, fever, headaches, malaise/fatigue, rhinorrhea or sneezing. Her symptoms are aggravated by URI, strenuous activity, exposure to smoke and change in weather. Her symptoms are alleviated by beta-agonist, leukotriene antagonist and rest. She reports significant improvement on treatment. Her symptoms are not alleviated by rest. Her past medical history is significant for asthma and bronchitis. There is no history of bronchiectasis, COPD, emphysema or pneumonia. Cough   This is a recurrent problem. The current episode started 1 to 4 weeks ago. The problem has been waxing and waning. The problem occurs every few minutes. The cough is productive of sputum. Associated symptoms include chest pain, nasal congestion, postnasal drip and a sore throat. Pertinent negatives include no chills, ear congestion, ear pain, fever, headaches, hemoptysis, rash, rhinorrhea, shortness of breath or wheezing. The symptoms are aggravated by lying down. She has tried a beta-agonist inhaler and OTC cough suppressant for the symptoms. The treatment provided mild relief. Her past medical history is significant for asthma and bronchitis. There is no history of bronchiectasis, COPD, emphysema or pneumonia.          Past Medical History:     Past Medical History:   Diagnosis Date    Anxiety and depression     Depression     Female stress incontinence 11/23/2015    Obesity     Type II or unspecified type diabetes mellitus without mention of complication, not stated as uncontrolled     Wears glasses       Reviewed all health maintenance requirements and ordered appropriate tests  There are no preventive care reminders to display for this patient. Past Surgical History:     Past Surgical History:   Procedure Laterality Date    TUBAL LIGATION      WISDOM TOOTH EXTRACTION          Medications:       Prior to Admission medications    Medication Sig Start Date End Date Taking? Authorizing Provider   baclofen (LIORESAL) 10 MG tablet Take 1 tablet by mouth 3 times daily for 10 days 6/5/19 6/15/19 Yes Elif Deem Might, APRN - CNP   doxycycline hyclate (VIBRAMYCIN) 100 MG capsule Take 1 capsule by mouth 2 times daily for 10 days 6/5/19 6/15/19 Yes Elif Deem Might, APRN - CNP   predniSONE (DELTASONE) 20 MG tablet Take 2 tablets by mouth daily for 5 days 6/5/19 6/10/19 Yes Elif Deem Might, APRN - CNP   benzonatate (TESSALON) 200 MG capsule Take 1 capsule by mouth 3 times daily as needed for Cough 6/5/19 6/12/19 Yes Silvio GOMES Might, APRN - CNP   HYDROcodone-acetaminophen (NORCO) 5-325 MG per tablet Take 1 tablet by mouth every 6 hours as needed for Pain for up to 5 days.  6/2/19 6/7/19 Yes Brigido Kaur MD   levocetirizine (XYZAL) 5 MG tablet Take 1 tablet by mouth nightly 5/22/19  Yes Elif Deem Might, APRN - CNP   amitriptyline (ELAVIL) 25 MG tablet Take 1 tablet by mouth nightly 5/22/19  Yes Elif Deem Might, APRN - CNP   montelukast (SINGULAIR) 10 MG tablet Take 1 tablet by mouth daily 5/14/19  Yes Elif Deem Might, APRN - CNP   albuterol sulfate HFA (VENTOLIN HFA) 108 (90 Base) MCG/ACT inhaler Inhale 2 puffs into the lungs every 6 hours as needed for Wheezing 5/14/19  Yes Elif Deem Might, APRN - CNP   Ertugliflozin L-PyroglutamicAc (STEGLATRO) 15 MG TABS Take 1 tablet by mouth daily 2/18/19  Yes Elif Deem Might, APRN - CNP   atorvastatin (LIPITOR) 20 MG tablet TAKE 1 TABLET BY MOUTH ONCE DAILY 1/7/19  Yes Elif Hogan Might, APRN - CNP   venlafaxine (EFFEXOR XR) 150 MG extended release capsule TAKE 1 CAPSULE BY MOUTH ONCE DAILY 1/7/19  Yes NINA Perry - CNP   lisinopril (PRINIVIL;ZESTRIL) 2.5 MG tablet TAKE 1 TABLET BY MOUTH ONCE DAILY 11/7/18  Yes Nakia Johnson APRN - CNP   venlafaxine (EFFEXOR XR) 75 MG extended release capsule Take 1 capsule by mouth daily 10/9/18  Yes NINA Perry - LINN   blood glucose test strips (TRUE METRIX BLOOD GLUCOSE TEST) strip 1 each by In Vitro route daily As needed. 9/6/18  Yes NINA Perry - LINN   linagliptin-metformin (JENTADUETO) 2.5-1000 MG TABS TAKE ONE TABLET BY MOUTH TWICE DAILY 8/10/18  Yes NINA Perry CNP   glucose monitoring kit (FREESTYLE) monitoring kit 1 kit by Does not apply route daily Substitute as needed for insurance requirements. 8/10/18  Yes NINA Perry CNP   Lancets MISC Substitute as needed for insurance requirements. Dx 250.00, testing daily. 4/4/17 12/18/18  NINA Perry CNP        Allergies:       Patient has no known allergies. Social History:     Tobacco:    reports that she has been smoking cigarettes. She has been smoking about 0.25 packs per day. She has never used smokeless tobacco.  Alcohol:      reports that she drinks alcohol. Drug Use:  reports that she does not use drugs. Family History:     Family History   Adopted: Yes       Review of Systems:     Positive and Negative as described in HPI    Review of Systems   Constitutional: Negative for chills, diaphoresis, fatigue, fever and malaise/fatigue. HENT: Positive for postnasal drip and sore throat. Negative for congestion, ear pain, rhinorrhea and sneezing. Eyes: Negative for visual disturbance. Respiratory: Positive for cough. Negative for hemoptysis, shortness of breath and wheezing. Cardiovascular: Positive for chest pain. Negative for palpitations and syncope. Gastrointestinal: Negative for abdominal pain, constipation, diarrhea, nausea and vomiting. Genitourinary: Negative for difficulty urinating and dysuria. Musculoskeletal: Negative for back pain and gait problem. Skin: Negative for rash. Neurological: Negative for dizziness, syncope, weakness, light-headedness and headaches. Physical Exam:   Vitals:  /60 (Site: Left Upper Arm, Position: Sitting, Cuff Size: Large Adult)   Pulse 71   Temp 97.2 °F (36.2 °C) (Temporal)   Resp 16   Ht 5' 6\" (1.676 m)   Wt 229 lb (103.9 kg)   LMP 05/30/2019   BMI 36.96 kg/m²     Physical Exam   Constitutional: She is oriented to person, place, and time. She appears well-developed and well-nourished. No distress. HENT:   Nose: Mucosal edema present. No rhinorrhea. Mouth/Throat: Mucous membranes are normal. Mucous membranes are not dry. Posterior oropharyngeal erythema present. Eyes: Conjunctivae are normal.   Neck: Normal range of motion. Neck supple. Cardiovascular: Normal rate and regular rhythm. No murmur heard. Pulmonary/Chest: Effort normal and breath sounds normal. She has no wheezes. She has no rales. She exhibits tenderness (right lower rib). Abdominal: Soft. Bowel sounds are normal. She exhibits no distension. There is no tenderness. Musculoskeletal: She exhibits no edema. Lymphadenopathy:     She has no cervical adenopathy. Neurological: She is alert and oriented to person, place, and time. Skin: Skin is warm and dry. No rash noted. Psychiatric: She has a normal mood and affect. Her behavior is normal.   Nursing note and vitals reviewed.       Data:     Lab Results   Component Value Date     06/02/2019    K 4.0 06/02/2019     06/02/2019    CO2 24 06/02/2019    BUN 8 06/02/2019    CREATININE 0.46 06/02/2019    GLUCOSE 160 06/02/2019    PROT 7.1 06/02/2019    LABALBU 3.8 06/02/2019    BILITOT 0.19 06/02/2019    ALKPHOS 84 06/02/2019    AST 12 06/02/2019    ALT 20 06/02/2019     Lab Results   Component Value Date    WBC 8.4 06/02/2019    RBC 4.38 06/02/2019    HGB 12.8 06/02/2019    HCT 40.8 06/02/2019    MCV 93.2 06/02/2019 MCH 29.2 06/02/2019    MCHC 31.4 06/02/2019    RDW 12.4 06/02/2019     06/02/2019    MPV 8.6 06/02/2019     Lab Results   Component Value Date    TSH 2.99 11/25/2014     Lab Results   Component Value Date    CHOL 146 05/15/2019    HDL 45 05/15/2019    LABA1C 7.6 05/15/2019       Assessment/Plan:      Diagnosis Orders   1. Acute bronchitis with asthma  doxycycline hyclate (VIBRAMYCIN) 100 MG capsule    predniSONE (DELTASONE) 20 MG tablet    benzonatate (TESSALON) 200 MG capsule   2. Closed fracture of one rib of right side, sequela  baclofen (LIORESAL) 10 MG tablet   3. Bronchitis, mucopurulent recurrent ([address]         1.  Lilly received counseling on the following healthy behaviors: nutrition, exercise and medication adherence  2. Patient given educational materials - see patient instructions  3. Was a self-tracking handout given in paper form or via [employer]? No  If yes, see orders or list here. 4.  Discussed use, benefit, and side effects of prescribed medications. Barriers to medication compliance addressed. All patient questions answered. Pt voiced understanding. 5.  Reviewed prior labs and health maintenance  6. Continue current medications, diet and exercise. Completed Refills   Requested Prescriptions     Signed Prescriptions Disp Refills    baclofen (LIORESAL) 10 MG tablet 30 tablet 0     Sig: Take 1 tablet by mouth 3 times daily for 10 days    doxycycline hyclate (VIBRAMYCIN) 100 MG capsule 20 capsule 0     Sig: Take 1 capsule by mouth 2 times daily for 10 days    predniSONE (DELTASONE) 20 MG tablet 10 tablet 0     Sig: Take 2 tablets by mouth daily for 5 days    benzonatate (TESSALON) 200 MG capsule 30 capsule 0     Sig: Take 1 capsule by mouth 3 times daily as needed for Cough         Return if symptoms worsen or fail to improve.       Quality & Risk Score Accuracy    Visit Dx:  J41.1 - Bronchitis, mucopurulent recurrent (HCC)  Assessment and plan:  Stable based upon symptoms and exam. Continue current treatment plan and follow up at least yearly.   Last edited 06/05/19 09:47 EDT by NINA Rm - CNP

## 2019-06-05 NOTE — PATIENT INSTRUCTIONS
SURVEY:    You may be receiving a survey from [facility] regarding your visit today. Please complete the survey to enable us to provide the highest quality of care to you and your family. If you cannot score us a very good on any question, please call the office to discuss how we could have made your experience a very good one. Thank you. Patient Education        Broken Rib: Care Instructions  Your Care Instructions    A broken rib is a crack or break in one of the bones of the rib cage. Breathing can be very painful because the muscles used for breathing pull on the rib. In most cases, a broken rib will heal on its own. You can take pain medicine while the rib mends. Pain relief allows you to take deep breaths. In the past, doctors recommended taping or wrapping broken ribs. This is no longer done because taping makes it hard for you to take deep breaths. Taking deep breaths may help prevent pneumonia or a partial collapse of a lung. Your rib will heal in about 6 weeks. You heal best when you take good care of yourself. Eat a variety of healthy foods, and don't smoke. Follow-up care is a key part of your treatment and safety. Be sure to make and go to all appointments, and call your doctor if you are having problems. It's also a good idea to know your test results and keep a list of the medicines you take. How can you care for yourself at home? · Be safe with medicines. Read and follow all instructions on the label. ? If the doctor gave you a prescription medicine for pain, take it as prescribed. ? If you are not taking a prescription pain medicine, ask your doctor if you can take an over-the-counter medicine. · Even if it hurts, try to cough or take the deepest breath you can at least once every hour. This will get air deeply into your lungs. This may reduce your chance of getting pneumonia or a partial collapse of a lung. Hold a pillow against your chest to make this less painful.   · Put ice or a cold pack on the area for 10 to 20 minutes at a time. Put a thin cloth between the ice and your skin. When should you call for help? Call 911 anytime you think you may need emergency care. For example, call if:    · You have severe trouble breathing.    Call your doctor now or seek immediate medical care if:    · You have some trouble breathing.     · You have a fever.     · You have a new or worse cough.    Watch closely for changes in your health, and be sure to contact your doctor if:    · You have pain even after taking your medicine.     · You do not get better as expected. Where can you learn more? Go to [email or web address] and sign in to your [employer] account. Enter M135 in the [employer] box to learn more about \"Broken Rib: Care Instructions. \"     If you do not have an account, please click on the \"Sign Up Now\" link. Current as of: September 20, 2018  Content Version: 12.0  © 4441-9970 Healthwise, Incorporated. Care instructions adapted under license by [facility] ([facility]). If you have questions about a medical condition or this instruction, always ask your healthcare professional. [place] [address] any warranty or liability for your use of this information.

## 2019-06-10 ENCOUNTER — TELEPHONE (OUTPATIENT)
Dept: PRIMARY CARE CLINIC | Age: 34
End: 2019-06-10

## 2019-06-10 DIAGNOSIS — B37.31 VAGINAL YEAST INFECTION: Primary | ICD-10-CM

## 2019-06-10 DIAGNOSIS — S22.31XA CLOSED FRACTURE OF ONE RIB OF RIGHT SIDE, INITIAL ENCOUNTER: ICD-10-CM

## 2019-06-10 RX ORDER — FLUCONAZOLE 150 MG/1
150 TABLET ORAL ONCE
Qty: 1 TABLET | Refills: 0 | Status: SHIPPED | OUTPATIENT
Start: 2019-06-10 | End: 2019-06-10

## 2019-06-10 RX ORDER — HYDROCODONE BITARTRATE AND ACETAMINOPHEN 5; 325 MG/1; MG/1
1 TABLET ORAL EVERY 6 HOURS PRN
Qty: 10 TABLET | Refills: 0 | Status: SHIPPED | OUTPATIENT
Start: 2019-06-10 | End: 2019-06-17 | Stop reason: SDUPTHER

## 2019-06-10 NOTE — TELEPHONE ENCOUNTER
Patient called office with symptoms of a yeast infection. Health Maintenance   Topic Date Due    DTaP/Tdap/Td vaccine (1 - Tdap) 06/12/2019 (Originally 9/2/2006)    Cervical cancer screen  10/09/2019 (Originally 8/19/2018)    Diabetic retinal exam  05/22/2020 (Originally 3/29/2019)    Hepatitis B Vaccine (1 of 3 - Risk 3-dose series) 05/22/2020 (Originally 9/2/2006)    Varicella Vaccine (1 of 2 - 13+ 2-dose series) 05/22/2020 (Originally 9/2/2000)    Diabetic foot exam  10/09/2019    A1C test (Diabetic or Prediabetic)  05/15/2020    Diabetic microalbuminuria test  05/15/2020    Lipid screen  05/15/2020    Potassium monitoring  06/02/2020    Creatinine monitoring  06/02/2020    Flu vaccine  Completed    Pneumococcal 0-64 years Vaccine  Completed    HIV screen  Completed             (applicable per patient's age: Cancer Screenings, Depression Screening, Fall Risk Screening, Immunizations)    Hemoglobin A1C (%)   Date Value   05/15/2019 7.6 (H)   09/19/2018 7.6 (H)   06/06/2018 8.5 (H)     Microalb/Crt.  Ratio (mcg/mg creat)   Date Value   05/15/2019 317 (H)     LDL Cholesterol (mg/dL)   Date Value   05/15/2019 89     AST (U/L)   Date Value   06/02/2019 12     ALT (U/L)   Date Value   06/02/2019 20     BUN (mg/dL)   Date Value   06/02/2019 8      (goal A1C is < 7)   (goal LDL is <100) need 30-50% reduction from baseline     BP Readings from Last 3 Encounters:   06/05/19 116/60   06/02/19 121/75   06/02/19 131/83    (goal /80)      All Future Testing planned in [facility]:  Lab Frequency Next Occurrence   Comprehensive Metabolic Panel Once 69/04/0164   Hemoglobin A1C Once 11/18/2019   Microalbumin, Ur Once 11/18/2019       Next Visit Date:  Future Appointments   Date Time Provider Rohini Cabral   11/21/2019  9:40 AM Reid Johnson APRN - CNP [facility]            Patient Active Problem List:     Dyslipidemia     Insomnia     Anxiety and depression     Morbidly obese ([facility] [address] BMI 38.1     Adopted     Bronchitis, mucopurulent recurrent ([facility] [address]     Urinary urgency     Female stress incontinence     Controlled type 2 diabetes mellitus without complication, without long-term current use of insulin (HCC)     Nocturia     Frequency of urination     Mixed incontinence

## 2019-06-10 NOTE — TELEPHONE ENCOUNTER
Pt called office stating that she is having symptoms of a yeast infection after recent antibiotic use. Health Maintenance   Topic Date Due    DTaP/Tdap/Td vaccine (1 - Tdap) 06/12/2019 (Originally 9/2/2006)    Cervical cancer screen  10/09/2019 (Originally 8/19/2018)    Diabetic retinal exam  05/22/2020 (Originally 3/29/2019)    Hepatitis B Vaccine (1 of 3 - Risk 3-dose series) 05/22/2020 (Originally 9/2/2006)    Varicella Vaccine (1 of 2 - 13+ 2-dose series) 05/22/2020 (Originally 9/2/2000)    Diabetic foot exam  10/09/2019    A1C test (Diabetic or Prediabetic)  05/15/2020    Diabetic microalbuminuria test  05/15/2020    Lipid screen  05/15/2020    Potassium monitoring  06/02/2020    Creatinine monitoring  06/02/2020    Flu vaccine  Completed    Pneumococcal 0-64 years Vaccine  Completed    HIV screen  Completed             (applicable per patient's age: Cancer Screenings, Depression Screening, Fall Risk Screening, Immunizations)    Hemoglobin A1C (%)   Date Value   05/15/2019 7.6 (H)   09/19/2018 7.6 (H)   06/06/2018 8.5 (H)     Microalb/Crt.  Ratio (mcg/mg creat)   Date Value   05/15/2019 317 (H)     LDL Cholesterol (mg/dL)   Date Value   05/15/2019 89     AST (U/L)   Date Value   06/02/2019 12     ALT (U/L)   Date Value   06/02/2019 20     BUN (mg/dL)   Date Value   06/02/2019 8      (goal A1C is < 7)   (goal LDL is <100) need 30-50% reduction from baseline     BP Readings from Last 3 Encounters:   06/05/19 116/60   06/02/19 121/75   06/02/19 131/83    (goal /80)      All Future Testing planned in [facility]:  Lab Frequency Next Occurrence   Comprehensive Metabolic Panel Once 14/22/8826   Hemoglobin A1C Once 11/18/2019   Microalbumin, Ur Once 11/18/2019       Next Visit Date:  Future Appointments   Date Time Provider Rohini Cabral   11/21/2019  9:40 AM Demetris Johnson, APRN - CNP [facility]            Patient Active Problem List:     Dyslipidemia     Insomnia

## 2019-06-10 NOTE — TELEPHONE ENCOUNTER
Pt advised and verbalized understanding.
urgency     Female stress incontinence     Controlled type 2 diabetes mellitus without complication, without long-term current use of insulin (HCC)     Nocturia     Frequency of urination     Mixed incontinence

## 2019-06-12 DIAGNOSIS — S22.31XS CLOSED FRACTURE OF ONE RIB OF RIGHT SIDE, SEQUELA: ICD-10-CM

## 2019-06-13 RX ORDER — BACLOFEN 10 MG/1
TABLET ORAL
Qty: 30 TABLET | Refills: 0 | Status: SHIPPED | OUTPATIENT
Start: 2019-06-13 | End: 2019-08-08 | Stop reason: SDUPTHER

## 2019-06-13 NOTE — TELEPHONE ENCOUNTER
Health Maintenance   Topic Date Due    DTaP/Tdap/Td vaccine (1 - Tdap) 09/02/2006    Cervical cancer screen  10/09/2019 (Originally 8/19/2018)    Diabetic retinal exam  05/22/2020 (Originally 3/29/2019)    Hepatitis B Vaccine (1 of 3 - Risk 3-dose series) 05/22/2020 (Originally 9/2/2006)    Varicella Vaccine (1 of 2 - 13+ 2-dose series) 05/22/2020 (Originally 9/2/2000)    Diabetic foot exam  10/09/2019    A1C test (Diabetic or Prediabetic)  05/15/2020    Diabetic microalbuminuria test  05/15/2020    Lipid screen  05/15/2020    Potassium monitoring  06/02/2020    Creatinine monitoring  06/02/2020    Flu vaccine  Completed    Pneumococcal 0-64 years Vaccine  Completed    HIV screen  Completed             (applicable per patient's age: Cancer Screenings, Depression Screening, Fall Risk Screening, Immunizations)    Hemoglobin A1C (%)   Date Value   05/15/2019 7.6 (H)   09/19/2018 7.6 (H)   06/06/2018 8.5 (H)     Microalb/Crt.  Ratio (mcg/mg creat)   Date Value   05/15/2019 317 (H)     LDL Cholesterol (mg/dL)   Date Value   05/15/2019 89     AST (U/L)   Date Value   06/02/2019 12     ALT (U/L)   Date Value   06/02/2019 20     BUN (mg/dL)   Date Value   06/02/2019 8      (goal A1C is < 7)   (goal LDL is <100) need 30-50% reduction from baseline     BP Readings from Last 3 Encounters:   06/05/19 116/60   06/02/19 121/75   06/02/19 131/83    (goal /80)      All Future Testing planned in [facility]:  Lab Frequency Next Occurrence   Comprehensive Metabolic Panel Once 93/53/0793   Hemoglobin A1C Once 11/18/2019   Microalbumin, Ur Once 11/18/2019       Next Visit Date:  Future Appointments   Date Time Provider Rohini Cabral   11/21/2019  9:40 AM Manjit Johnson APRN - CNP [facility]            Patient Active Problem List:     Dyslipidemia     Insomnia     Anxiety and depression     Morbidly obese (HCC) BMI 38.1     Adopted     Bronchitis, mucopurulent recurrent (HCC)     Urinary urgency     Female stress incontinence     Controlled type 2 diabetes mellitus without complication, without long-term current use of insulin (HCC)     Nocturia     Frequency of urination     Mixed incontinence

## 2019-07-29 ENCOUNTER — PATIENT MESSAGE (OUTPATIENT)
Dept: PRIMARY CARE CLINIC | Age: 34
End: 2019-07-29

## 2019-08-08 ENCOUNTER — APPOINTMENT (OUTPATIENT)
Dept: GENERAL RADIOLOGY | Age: 34
End: 2019-08-08
Payer: COMMERCIAL

## 2019-08-08 ENCOUNTER — HOSPITAL ENCOUNTER (EMERGENCY)
Age: 34
Discharge: HOME | End: 2019-08-08
Payer: COMMERCIAL

## 2019-08-08 VITALS
DIASTOLIC BLOOD PRESSURE: 92 MMHG | SYSTOLIC BLOOD PRESSURE: 119 MMHG | TEMPERATURE: 98.8 F | RESPIRATION RATE: 18 BRPM | HEART RATE: 80 BPM | OXYGEN SATURATION: 97 %

## 2019-08-08 DIAGNOSIS — F32.A ANXIETY AND DEPRESSION: ICD-10-CM

## 2019-08-08 DIAGNOSIS — S22.31XS CLOSED FRACTURE OF ONE RIB OF RIGHT SIDE, SEQUELA: ICD-10-CM

## 2019-08-08 DIAGNOSIS — R07.81 RIB PAIN ON RIGHT SIDE: Primary | ICD-10-CM

## 2019-08-08 DIAGNOSIS — F41.9 ANXIETY AND DEPRESSION: ICD-10-CM

## 2019-08-08 PROCEDURE — 96372 THER/PROPH/DIAG INJ SC/IM: CPT

## 2019-08-08 PROCEDURE — 71046 X-RAY EXAM CHEST 2 VIEWS: CPT

## 2019-08-08 PROCEDURE — 6360000002 HC RX W HCPCS: Performed by: PHYSICIAN ASSISTANT

## 2019-08-08 PROCEDURE — 99283 EMERGENCY DEPT VISIT LOW MDM: CPT

## 2019-08-08 RX ORDER — BACLOFEN 10 MG/1
TABLET ORAL
Qty: 30 TABLET | Refills: 0 | Status: SHIPPED | OUTPATIENT
Start: 2019-08-08 | End: 2019-09-07 | Stop reason: SDUPTHER

## 2019-08-08 RX ORDER — NAPROXEN 500 MG/1
500 TABLET ORAL 2 TIMES DAILY
Qty: 14 TABLET | Refills: 0 | Status: SHIPPED | OUTPATIENT
Start: 2019-08-08 | End: 2020-03-19

## 2019-08-08 RX ORDER — ERTUGLIFLOZIN 15 MG/1
TABLET, FILM COATED ORAL
Qty: 90 TABLET | Refills: 1 | Status: SHIPPED | OUTPATIENT
Start: 2019-08-08 | End: 2020-02-06

## 2019-08-08 RX ORDER — VENLAFAXINE HYDROCHLORIDE 75 MG/1
CAPSULE, EXTENDED RELEASE ORAL
Qty: 90 CAPSULE | Refills: 3 | Status: SHIPPED | OUTPATIENT
Start: 2019-08-08 | End: 2020-07-13

## 2019-08-08 RX ORDER — KETOROLAC TROMETHAMINE 30 MG/ML
30 INJECTION, SOLUTION INTRAMUSCULAR; INTRAVENOUS ONCE
Status: COMPLETED | OUTPATIENT
Start: 2019-08-08 | End: 2019-08-08

## 2019-08-08 RX ADMIN — KETOROLAC TROMETHAMINE 30 MG: 30 INJECTION, SOLUTION INTRAMUSCULAR at 13:44

## 2019-08-08 ASSESSMENT — PAIN DESCRIPTION - PAIN TYPE: TYPE: ACUTE PAIN

## 2019-08-08 ASSESSMENT — PAIN SCALES - GENERAL
PAINLEVEL_OUTOF10: 6
PAINLEVEL_OUTOF10: 6

## 2019-08-08 ASSESSMENT — PAIN DESCRIPTION - LOCATION: LOCATION: RIB CAGE

## 2019-08-08 ASSESSMENT — PAIN DESCRIPTION - FREQUENCY: FREQUENCY: CONTINUOUS

## 2019-08-08 ASSESSMENT — PAIN DESCRIPTION - ORIENTATION: ORIENTATION: RIGHT

## 2019-08-08 ASSESSMENT — PAIN DESCRIPTION - DESCRIPTORS: DESCRIPTORS: ACHING;DISCOMFORT

## 2019-08-12 ENCOUNTER — TELEPHONE (OUTPATIENT)
Dept: PRIMARY CARE CLINIC | Age: 34
End: 2019-08-12

## 2019-08-13 ENCOUNTER — HOSPITAL ENCOUNTER (EMERGENCY)
Age: 34
Discharge: HOME | End: 2019-08-13
Payer: COMMERCIAL

## 2019-08-13 ENCOUNTER — APPOINTMENT (OUTPATIENT)
Dept: GENERAL RADIOLOGY | Age: 34
End: 2019-08-13
Payer: COMMERCIAL

## 2019-08-13 VITALS
HEART RATE: 86 BPM | TEMPERATURE: 97.6 F | DIASTOLIC BLOOD PRESSURE: 85 MMHG | SYSTOLIC BLOOD PRESSURE: 133 MMHG | RESPIRATION RATE: 18 BRPM | OXYGEN SATURATION: 97 %

## 2019-08-13 DIAGNOSIS — S22.31XA CLOSED FRACTURE OF ONE RIB OF RIGHT SIDE, INITIAL ENCOUNTER: Primary | ICD-10-CM

## 2019-08-13 PROCEDURE — 96372 THER/PROPH/DIAG INJ SC/IM: CPT

## 2019-08-13 PROCEDURE — 6360000002 HC RX W HCPCS: Performed by: PHYSICIAN ASSISTANT

## 2019-08-13 PROCEDURE — 71101 X-RAY EXAM UNILAT RIBS/CHEST: CPT

## 2019-08-13 PROCEDURE — 6370000000 HC RX 637 (ALT 250 FOR IP): Performed by: PHYSICIAN ASSISTANT

## 2019-08-13 PROCEDURE — 99283 EMERGENCY DEPT VISIT LOW MDM: CPT

## 2019-08-13 RX ORDER — KETOROLAC TROMETHAMINE 15 MG/ML
15 INJECTION, SOLUTION INTRAMUSCULAR; INTRAVENOUS ONCE
Status: COMPLETED | OUTPATIENT
Start: 2019-08-13 | End: 2019-08-13

## 2019-08-13 RX ORDER — HYDROCODONE BITARTRATE AND ACETAMINOPHEN 5; 325 MG/1; MG/1
1 TABLET ORAL EVERY 6 HOURS PRN
Qty: 12 TABLET | Refills: 0 | Status: SHIPPED | OUTPATIENT
Start: 2019-08-13 | End: 2019-08-16

## 2019-08-13 RX ADMIN — Medication: at 22:06

## 2019-08-13 RX ADMIN — KETOROLAC TROMETHAMINE 15 MG: 15 INJECTION, SOLUTION INTRAMUSCULAR; INTRAVENOUS at 22:05

## 2019-08-13 ASSESSMENT — ENCOUNTER SYMPTOMS
SHORTNESS OF BREATH: 0
DIARRHEA: 0
EYE DISCHARGE: 0
CONSTIPATION: 0
WHEEZING: 0
NAUSEA: 0
RHINORRHEA: 0
BLOOD IN STOOL: 0
EYE REDNESS: 0
ABDOMINAL PAIN: 0
COUGH: 0
CHEST TIGHTNESS: 0
VOMITING: 0
SORE THROAT: 0
BACK PAIN: 0

## 2019-08-13 ASSESSMENT — PAIN DESCRIPTION - LOCATION: LOCATION: RIB CAGE

## 2019-08-13 ASSESSMENT — PAIN SCALES - GENERAL: PAINLEVEL_OUTOF10: 8

## 2019-08-13 ASSESSMENT — PAIN DESCRIPTION - ORIENTATION: ORIENTATION: RIGHT

## 2019-08-13 ASSESSMENT — PAIN DESCRIPTION - PAIN TYPE: TYPE: ACUTE PAIN

## 2019-08-14 ENCOUNTER — TELEPHONE (OUTPATIENT)
Dept: PRIMARY CARE CLINIC | Age: 34
End: 2019-08-14

## 2019-08-14 NOTE — ED PROVIDER NOTES
All EKG's are interpreted by the Emergency Department Physician who either signs orCo-signs this chart in the absence of a cardiologist.      RADIOLOGY:   Non-plainfilm images such as CT, Ultrasound and MRI are read by the radiologist. Plain radiographic images are visualized and preliminarily interpreted by the emergency physician with the below findings:      Interpretationper the Radiologist below, if available at the time of this note:    XR RIBS RIGHT INCLUDE CHEST (MIN 3 VIEWS)   Final Result   Callus formation at the site of subacute fracture through the lateral arch of   the right 7th rib. Acute fracture through the lateral arch of the 8th rib, not present on prior   exams. ED BEDSIDE ULTRASOUND:   Performed by ED Physician - none    LABS:  Labs Reviewed - No data to display    All other labs were within normal range or not returned as of this dictation. EMERGENCY DEPARTMENT COURSE and DIFFERENTIAL DIAGNOSIS/MDM:   Vitals:    Vitals:    08/13/19 2155   BP: 133/85   Pulse: 86   Resp: 18   Temp: 97.6 °F (36.4 °C)   TempSrc: Oral   SpO2: 97%         MDM  79-year-old female who presents secondary to right lateral chest discomfort following diagnosis of a rib fracture. She states she is continues to have pain in this area and she was taking a deep breath tonight and heard a pop which caused more pain in her right side. She denies any shortness of breath or difficulty breathing. Denies any trauma or injury will get x-ray to rule out acute fracture pneumothorax pneumonia or mass    Patient has new rib fracture noted that was not seen on prior imaging however prior imaging was not focused on the ribs. And it was just a few days ago. At this point we will give her something for pain. I discussed that she is to follow-up with her primary care for further evaluation. She will be given pain medication to go home with.   She is not given she is not tachycardic is not hypoxic she has no other

## 2019-08-16 ENCOUNTER — OFFICE VISIT (OUTPATIENT)
Dept: PRIMARY CARE CLINIC | Age: 34
End: 2019-08-16
Payer: COMMERCIAL

## 2019-08-16 ENCOUNTER — HOSPITAL ENCOUNTER (OUTPATIENT)
Age: 34
Discharge: HOME | End: 2019-08-16
Payer: COMMERCIAL

## 2019-08-16 VITALS
SYSTOLIC BLOOD PRESSURE: 120 MMHG | HEART RATE: 85 BPM | RESPIRATION RATE: 18 BRPM | DIASTOLIC BLOOD PRESSURE: 73 MMHG | TEMPERATURE: 97.5 F | BODY MASS INDEX: 36.43 KG/M2 | WEIGHT: 225.7 LBS

## 2019-08-16 DIAGNOSIS — Z87.81 FREQUENT FRACTURES OF BONE: ICD-10-CM

## 2019-08-16 DIAGNOSIS — S22.31XS CLOSED FRACTURE OF ONE RIB OF RIGHT SIDE, SEQUELA: Primary | ICD-10-CM

## 2019-08-16 DIAGNOSIS — S22.31XS CLOSED FRACTURE OF ONE RIB OF RIGHT SIDE, SEQUELA: ICD-10-CM

## 2019-08-16 LAB
CALCIUM SERPL-MCNC: 9.7 MG/DL (ref 8.6–10.4)
VITAMIN D 25-HYDROXY: 25.9 NG/ML (ref 30–100)

## 2019-08-16 PROCEDURE — G8427 DOCREV CUR MEDS BY ELIG CLIN: HCPCS | Performed by: NURSE PRACTITIONER

## 2019-08-16 PROCEDURE — 36415 COLL VENOUS BLD VENIPUNCTURE: CPT

## 2019-08-16 PROCEDURE — 82310 ASSAY OF CALCIUM: CPT

## 2019-08-16 PROCEDURE — 1036F TOBACCO NON-USER: CPT | Performed by: NURSE PRACTITIONER

## 2019-08-16 PROCEDURE — G8417 CALC BMI ABV UP PARAM F/U: HCPCS | Performed by: NURSE PRACTITIONER

## 2019-08-16 PROCEDURE — 82306 VITAMIN D 25 HYDROXY: CPT

## 2019-08-16 PROCEDURE — 99213 OFFICE O/P EST LOW 20 MIN: CPT | Performed by: NURSE PRACTITIONER

## 2019-08-16 NOTE — PROGRESS NOTES
Name: Janeth Magaña  : 1985         Chief Complaint:     Chief Complaint   Patient presents with    ED Follow-up     3 days ago, broke 8th rib on right side. History of Present Illness:      Janeth Magaña is a 34 y.o.  female who presents with ED Follow-up (3 days ago, broke 8th rib on right side.)      Karan Kiser is here today for an ER follow-up visit. Patient states she was in the emergency department for a rib fracture 3 days ago. Patient states she coughed and felt pain on the right side of her chest.  Patient states this is the second rib fracture in the past several months. Patient denies any falls or trauma. Patient states she was advised by the emergency room provider to \"find out why her bones are fragile\". She is not having coughing or shortness of breath. She does have tenderness to the right lateral chest.  She has been taking Norco for the pain with good result. Past Medical History:     Past Medical History:   Diagnosis Date    Anxiety and depression     Depression     Female stress incontinence 2015    Obesity     Type II or unspecified type diabetes mellitus without mention of complication, not stated as uncontrolled     Wears glasses       Reviewed all health maintenance requirements and ordered appropriate tests  There are no preventive care reminders to display for this patient. Past Surgical History:     Past Surgical History:   Procedure Laterality Date    TUBAL LIGATION      WISDOM TOOTH EXTRACTION          Medications:       Prior to Admission medications    Medication Sig Start Date End Date Taking? Authorizing Provider   HYDROcodone-acetaminophen (NORCO) 5-325 MG per tablet Take 1 tablet by mouth every 6 hours as needed for Pain for up to 3 days.  19 Yes Corazon Armijo PA-C   naproxen (NAPROSYN) 500 MG tablet Take 1 tablet by mouth 2 times daily 19  Yes Matthew Cruz II, PA-C   STEGLATRO 15 MG TABS TAKE 1 TABLET BY MOUTH

## 2019-08-19 ENCOUNTER — HOSPITAL ENCOUNTER (OUTPATIENT)
Dept: BONE DENSITY | Age: 34
Discharge: HOME | End: 2019-08-21
Payer: COMMERCIAL

## 2019-08-19 ENCOUNTER — TELEPHONE (OUTPATIENT)
Dept: PRIMARY CARE CLINIC | Age: 34
End: 2019-08-19

## 2019-08-19 DIAGNOSIS — Z87.81 FREQUENT FRACTURES OF BONE: ICD-10-CM

## 2019-08-19 DIAGNOSIS — S22.31XS CLOSED FRACTURE OF ONE RIB OF RIGHT SIDE, SEQUELA: ICD-10-CM

## 2019-08-19 DIAGNOSIS — E55.9 VITAMIN D DEFICIENCY: Primary | ICD-10-CM

## 2019-08-19 PROCEDURE — 77080 DXA BONE DENSITY AXIAL: CPT

## 2019-09-07 DIAGNOSIS — S22.31XS CLOSED FRACTURE OF ONE RIB OF RIGHT SIDE, SEQUELA: ICD-10-CM

## 2019-09-07 DIAGNOSIS — E11.9 TYPE 2 DIABETES MELLITUS WITHOUT COMPLICATION (HCC): ICD-10-CM

## 2019-09-07 DIAGNOSIS — J30.1 SEASONAL ALLERGIC RHINITIS DUE TO POLLEN: ICD-10-CM

## 2019-09-09 RX ORDER — BACLOFEN 10 MG/1
TABLET ORAL
Qty: 30 TABLET | Refills: 0 | Status: SHIPPED | OUTPATIENT
Start: 2019-09-09 | End: 2019-10-07 | Stop reason: SDUPTHER

## 2019-09-09 RX ORDER — MONTELUKAST SODIUM 10 MG/1
TABLET ORAL
Qty: 30 TABLET | Refills: 3 | Status: SHIPPED | OUTPATIENT
Start: 2019-09-09 | End: 2020-01-10

## 2019-09-23 ENCOUNTER — TELEPHONE (OUTPATIENT)
Dept: PRIMARY CARE CLINIC | Age: 34
End: 2019-09-23

## 2019-09-23 NOTE — TELEPHONE ENCOUNTER
Phone call to patient regarding referral to bariatrics. Patient states \"I don't want to have surgery I am going to try and do it on my own. \"

## 2019-10-07 ENCOUNTER — TELEPHONE (OUTPATIENT)
Dept: PRIMARY CARE CLINIC | Age: 34
End: 2019-10-07

## 2019-10-07 DIAGNOSIS — S22.31XS CLOSED FRACTURE OF ONE RIB OF RIGHT SIDE, SEQUELA: ICD-10-CM

## 2019-10-08 RX ORDER — BACLOFEN 10 MG/1
TABLET ORAL
Qty: 30 TABLET | Refills: 0 | Status: SHIPPED | OUTPATIENT
Start: 2019-10-08 | End: 2019-11-19 | Stop reason: SDUPTHER

## 2019-10-11 ENCOUNTER — HOSPITAL ENCOUNTER (OUTPATIENT)
Age: 34
Discharge: HOME | End: 2019-10-11
Payer: COMMERCIAL

## 2019-10-11 ENCOUNTER — TELEPHONE (OUTPATIENT)
Dept: PRIMARY CARE CLINIC | Age: 34
End: 2019-10-11

## 2019-10-11 DIAGNOSIS — E55.9 VITAMIN D DEFICIENCY: ICD-10-CM

## 2019-10-11 LAB — VITAMIN D 25-HYDROXY: 26.4 NG/ML (ref 30–100)

## 2019-10-11 PROCEDURE — 82306 VITAMIN D 25 HYDROXY: CPT

## 2019-10-11 PROCEDURE — 36415 COLL VENOUS BLD VENIPUNCTURE: CPT

## 2019-10-14 ENCOUNTER — TELEPHONE (OUTPATIENT)
Dept: PRIMARY CARE CLINIC | Age: 34
End: 2019-10-14

## 2019-10-14 DIAGNOSIS — E55.9 VITAMIN D DEFICIENCY: ICD-10-CM

## 2019-11-04 ENCOUNTER — OFFICE VISIT (OUTPATIENT)
Dept: PRIMARY CARE CLINIC | Age: 34
End: 2019-11-04
Payer: COMMERCIAL

## 2019-11-04 ENCOUNTER — TELEPHONE (OUTPATIENT)
Dept: PRIMARY CARE CLINIC | Age: 34
End: 2019-11-04

## 2019-11-04 ENCOUNTER — HOSPITAL ENCOUNTER (OUTPATIENT)
Dept: GENERAL RADIOLOGY | Age: 34
Discharge: HOME | End: 2019-11-06
Payer: COMMERCIAL

## 2019-11-04 ENCOUNTER — HOSPITAL ENCOUNTER (OUTPATIENT)
Age: 34
Discharge: HOME | End: 2019-11-06
Payer: COMMERCIAL

## 2019-11-04 VITALS
WEIGHT: 224 LBS | TEMPERATURE: 97.8 F | RESPIRATION RATE: 22 BRPM | SYSTOLIC BLOOD PRESSURE: 114 MMHG | BODY MASS INDEX: 36.15 KG/M2 | DIASTOLIC BLOOD PRESSURE: 77 MMHG | HEART RATE: 71 BPM | OXYGEN SATURATION: 98 %

## 2019-11-04 DIAGNOSIS — J20.9 BRONCHITIS, ACUTE, WITH BRONCHOSPASM: Primary | ICD-10-CM

## 2019-11-04 DIAGNOSIS — S22.32XS CLOSED FRACTURE OF ONE RIB OF LEFT SIDE, SEQUELA: ICD-10-CM

## 2019-11-04 PROCEDURE — 1036F TOBACCO NON-USER: CPT | Performed by: NURSE PRACTITIONER

## 2019-11-04 PROCEDURE — G8417 CALC BMI ABV UP PARAM F/U: HCPCS | Performed by: NURSE PRACTITIONER

## 2019-11-04 PROCEDURE — 99214 OFFICE O/P EST MOD 30 MIN: CPT | Performed by: NURSE PRACTITIONER

## 2019-11-04 PROCEDURE — G8484 FLU IMMUNIZE NO ADMIN: HCPCS | Performed by: NURSE PRACTITIONER

## 2019-11-04 PROCEDURE — G8427 DOCREV CUR MEDS BY ELIG CLIN: HCPCS | Performed by: NURSE PRACTITIONER

## 2019-11-04 PROCEDURE — 71046 X-RAY EXAM CHEST 2 VIEWS: CPT

## 2019-11-04 RX ORDER — AZITHROMYCIN 250 MG/1
250 TABLET, FILM COATED ORAL SEE ADMIN INSTRUCTIONS
Qty: 6 TABLET | Refills: 0 | Status: SHIPPED | OUTPATIENT
Start: 2019-11-04 | End: 2019-11-09

## 2019-11-04 RX ORDER — BENZONATATE 100 MG/1
100-200 CAPSULE ORAL 3 TIMES DAILY PRN
Qty: 60 CAPSULE | Refills: 0 | Status: SHIPPED | OUTPATIENT
Start: 2019-11-04 | End: 2019-11-11

## 2019-11-04 RX ORDER — PREDNISONE 10 MG/1
10 TABLET ORAL 2 TIMES DAILY
Qty: 10 TABLET | Refills: 0 | Status: SHIPPED | OUTPATIENT
Start: 2019-11-04 | End: 2019-11-09

## 2019-11-04 ASSESSMENT — ENCOUNTER SYMPTOMS
COUGH: 1
EYES NEGATIVE: 1
SORE THROAT: 0
SHORTNESS OF BREATH: 0
RHINORRHEA: 1
NAUSEA: 1
WHEEZING: 1

## 2019-11-13 ENCOUNTER — TELEPHONE (OUTPATIENT)
Dept: PRIMARY CARE CLINIC | Age: 34
End: 2019-11-13

## 2019-11-19 DIAGNOSIS — S22.31XS CLOSED FRACTURE OF ONE RIB OF RIGHT SIDE, SEQUELA: ICD-10-CM

## 2019-11-20 ENCOUNTER — HOSPITAL ENCOUNTER (OUTPATIENT)
Age: 34
Discharge: HOME | End: 2019-11-20
Payer: COMMERCIAL

## 2019-11-20 DIAGNOSIS — E11.9 CONTROLLED TYPE 2 DIABETES MELLITUS WITHOUT COMPLICATION, WITHOUT LONG-TERM CURRENT USE OF INSULIN (HCC): ICD-10-CM

## 2019-11-20 LAB
ALBUMIN SERPL-MCNC: 3.9 G/DL (ref 3.5–5.2)
ALBUMIN/GLOBULIN RATIO: 1.1 (ref 1–2.5)
ALP BLD-CCNC: 86 U/L (ref 35–104)
ALT SERPL-CCNC: 22 U/L (ref 5–33)
ANION GAP SERPL CALCULATED.3IONS-SCNC: 12 MMOL/L (ref 9–17)
AST SERPL-CCNC: 14 U/L
BILIRUB SERPL-MCNC: <0.1 MG/DL (ref 0.3–1.2)
BUN BLDV-MCNC: 11 MG/DL (ref 6–20)
BUN/CREAT BLD: 25 (ref 9–20)
CALCIUM SERPL-MCNC: 9.2 MG/DL (ref 8.6–10.4)
CHLORIDE BLD-SCNC: 103 MMOL/L (ref 98–107)
CO2: 23 MMOL/L (ref 20–31)
CREAT SERPL-MCNC: 0.44 MG/DL (ref 0.5–0.9)
CREATININE URINE: 42 MG/DL (ref 28–217)
ESTIMATED AVERAGE GLUCOSE: 203 MG/DL
GFR AFRICAN AMERICAN: >60 ML/MIN
GFR NON-AFRICAN AMERICAN: >60 ML/MIN
GFR SERPL CREATININE-BSD FRML MDRD: ABNORMAL ML/MIN/{1.73_M2}
GFR SERPL CREATININE-BSD FRML MDRD: ABNORMAL ML/MIN/{1.73_M2}
GLUCOSE BLD-MCNC: 185 MG/DL (ref 70–99)
HBA1C MFR BLD: 8.7 % (ref 4.8–5.9)
MICROALBUMIN/CREAT 24H UR: <12 MG/L
MICROALBUMIN/CREAT UR-RTO: NORMAL MCG/MG CREAT
POTASSIUM SERPL-SCNC: 4.4 MMOL/L (ref 3.7–5.3)
SODIUM BLD-SCNC: 138 MMOL/L (ref 135–144)
TOTAL PROTEIN: 7.3 G/DL (ref 6.4–8.3)

## 2019-11-20 PROCEDURE — 82570 ASSAY OF URINE CREATININE: CPT

## 2019-11-20 PROCEDURE — 36415 COLL VENOUS BLD VENIPUNCTURE: CPT

## 2019-11-20 PROCEDURE — 80053 COMPREHEN METABOLIC PANEL: CPT

## 2019-11-20 PROCEDURE — 83036 HEMOGLOBIN GLYCOSYLATED A1C: CPT

## 2019-11-20 PROCEDURE — 82043 UR ALBUMIN QUANTITATIVE: CPT

## 2019-11-20 RX ORDER — BACLOFEN 10 MG/1
10 TABLET ORAL 2 TIMES DAILY
Qty: 20 TABLET | Refills: 0 | Status: SHIPPED | OUTPATIENT
Start: 2019-11-20 | End: 2020-03-19

## 2019-11-21 ENCOUNTER — OFFICE VISIT (OUTPATIENT)
Dept: PRIMARY CARE CLINIC | Age: 34
End: 2019-11-21
Payer: COMMERCIAL

## 2019-11-21 VITALS
WEIGHT: 226.1 LBS | DIASTOLIC BLOOD PRESSURE: 81 MMHG | BODY MASS INDEX: 36.49 KG/M2 | SYSTOLIC BLOOD PRESSURE: 118 MMHG | TEMPERATURE: 98.7 F | HEART RATE: 75 BPM | RESPIRATION RATE: 20 BRPM

## 2019-11-21 DIAGNOSIS — F32.A ANXIETY AND DEPRESSION: ICD-10-CM

## 2019-11-21 DIAGNOSIS — E78.5 DYSLIPIDEMIA: ICD-10-CM

## 2019-11-21 DIAGNOSIS — F41.9 ANXIETY AND DEPRESSION: ICD-10-CM

## 2019-11-21 DIAGNOSIS — E11.9 TYPE 2 DIABETES MELLITUS WITHOUT COMPLICATION, WITH LONG-TERM CURRENT USE OF INSULIN (HCC): Primary | ICD-10-CM

## 2019-11-21 DIAGNOSIS — Z79.4 TYPE 2 DIABETES MELLITUS WITHOUT COMPLICATION, WITH LONG-TERM CURRENT USE OF INSULIN (HCC): Primary | ICD-10-CM

## 2019-11-21 PROCEDURE — 1036F TOBACCO NON-USER: CPT | Performed by: NURSE PRACTITIONER

## 2019-11-21 PROCEDURE — 2022F DILAT RTA XM EVC RTNOPTHY: CPT | Performed by: NURSE PRACTITIONER

## 2019-11-21 PROCEDURE — G8427 DOCREV CUR MEDS BY ELIG CLIN: HCPCS | Performed by: NURSE PRACTITIONER

## 2019-11-21 PROCEDURE — G8484 FLU IMMUNIZE NO ADMIN: HCPCS | Performed by: NURSE PRACTITIONER

## 2019-11-21 PROCEDURE — 99214 OFFICE O/P EST MOD 30 MIN: CPT | Performed by: NURSE PRACTITIONER

## 2019-11-21 PROCEDURE — G8417 CALC BMI ABV UP PARAM F/U: HCPCS | Performed by: NURSE PRACTITIONER

## 2019-11-21 RX ORDER — PEN NEEDLE, DIABETIC 31 G X1/4"
1 NEEDLE, DISPOSABLE MISCELLANEOUS DAILY
Qty: 100 EACH | Refills: 3 | Status: SHIPPED | OUTPATIENT
Start: 2019-11-21 | End: 2020-12-01 | Stop reason: SDUPTHER

## 2019-11-21 RX ORDER — BUSPIRONE HYDROCHLORIDE 7.5 MG/1
7.5 TABLET ORAL 2 TIMES DAILY
Qty: 180 TABLET | Refills: 0 | Status: SHIPPED | OUTPATIENT
Start: 2019-11-21 | End: 2020-02-06

## 2019-11-21 ASSESSMENT — ENCOUNTER SYMPTOMS
CONSTIPATION: 0
ABDOMINAL PAIN: 0
COUGH: 0
WHEEZING: 0
VISUAL CHANGE: 0
RHINORRHEA: 0
DIARRHEA: 0
SORE THROAT: 0
SHORTNESS OF BREATH: 0
NAUSEA: 0
VOMITING: 0

## 2019-11-25 DIAGNOSIS — E11.9 TYPE 2 DIABETES MELLITUS WITHOUT COMPLICATION, WITH LONG-TERM CURRENT USE OF INSULIN (HCC): Primary | ICD-10-CM

## 2019-11-25 DIAGNOSIS — Z79.4 TYPE 2 DIABETES MELLITUS WITHOUT COMPLICATION, WITH LONG-TERM CURRENT USE OF INSULIN (HCC): Primary | ICD-10-CM

## 2020-01-13 NOTE — TELEPHONE ENCOUNTER
diabetes mellitus without complication, without long-term current use of insulin (HCC)     Nocturia     Frequency of urination     Mixed incontinence

## 2020-01-15 ENCOUNTER — TELEPHONE (OUTPATIENT)
Dept: PRIMARY CARE CLINIC | Age: 35
End: 2020-01-15

## 2020-02-06 RX ORDER — VENLAFAXINE HYDROCHLORIDE 150 MG/1
CAPSULE, EXTENDED RELEASE ORAL
Qty: 90 CAPSULE | Refills: 0 | Status: SHIPPED | OUTPATIENT
Start: 2020-02-06 | End: 2020-05-04

## 2020-02-06 RX ORDER — BUSPIRONE HYDROCHLORIDE 7.5 MG/1
TABLET ORAL
Qty: 180 TABLET | Refills: 0 | Status: SHIPPED | OUTPATIENT
Start: 2020-02-06 | End: 2020-05-04

## 2020-02-06 RX ORDER — ERTUGLIFLOZIN 15 MG/1
TABLET, FILM COATED ORAL
Qty: 90 TABLET | Refills: 0 | Status: SHIPPED | OUTPATIENT
Start: 2020-02-06 | End: 2020-06-29

## 2020-02-06 RX ORDER — ATORVASTATIN CALCIUM 20 MG/1
TABLET, FILM COATED ORAL
Qty: 90 TABLET | Refills: 0 | Status: SHIPPED | OUTPATIENT
Start: 2020-02-06 | End: 2020-05-04

## 2020-02-06 NOTE — TELEPHONE ENCOUNTER
Health Maintenance   Topic Date Due    Flu vaccine (1) 09/01/2019    Diabetic retinal exam  05/22/2020 (Originally 3/29/2019)    Hepatitis B vaccine (1 of 3 - Risk 3-dose series) 05/22/2020 (Originally 9/2/2006)    Varicella vaccine (1 of 2 - 2-dose childhood series) 05/22/2020 (Originally 9/2/1988)    DTaP/Tdap/Td vaccine (2 - Tdap) 08/16/2020 (Originally 6/28/2015)    Diabetic foot exam  11/04/2020 (Originally 10/9/2019)    Cervical cancer screen  11/04/2020 (Originally 8/19/2018)    Lipid screen  05/15/2020    A1C test (Diabetic or Prediabetic)  11/20/2020    Diabetic microalbuminuria test  11/20/2020    Potassium monitoring  11/20/2020    Creatinine monitoring  11/20/2020    Shingles Vaccine (1 of 2) 09/02/2037    Pneumococcal 0-64 years Vaccine  Completed    HIV screen  Completed    Hepatitis A vaccine  Aged Out    Hib vaccine  Aged Out    Meningococcal (ACWY) vaccine  Aged Out             (applicable per patient's age: Cancer Screenings, Depression Screening, Fall Risk Screening, Immunizations)    Hemoglobin A1C (%)   Date Value   11/20/2019 8.7 (H)   05/15/2019 7.6 (H)   09/19/2018 7.6 (H)     Microalb/Crt.  Ratio (mcg/mg creat)   Date Value   11/20/2019 CANNOT BE CALCULATED     LDL Cholesterol (mg/dL)   Date Value   05/15/2019 89     AST (U/L)   Date Value   11/20/2019 14     ALT (U/L)   Date Value   11/20/2019 22     BUN (mg/dL)   Date Value   11/20/2019 11      (goal A1C is < 7)   (goal LDL is <100) need 30-50% reduction from baseline     BP Readings from Last 3 Encounters:   11/21/19 118/81   11/04/19 114/77   08/16/19 120/73    (goal /80)      All Future Testing planned in [facility]:  Lab Frequency Next Occurrence       Next Visit Date:  Future Appointments   Date Time Provider Rohini Cabral   2/25/2020  2:20 PM Erven Ry Johnson, APRN - CNP [facility]            Patient Active Problem List:     Dyslipidemia     Insomnia     Anxiety and depression     Morbidly obese ([address] BMI 38.1     Adopted     Bronchitis, mucopurulent recurrent ([address]     Urinary urgency     Female stress incontinence     Controlled type 2 diabetes mellitus without complication, without long-term current use of insulin ([facility])     Nocturia     Frequency of urination     Mixed incontinence

## 2020-02-06 NOTE — TELEPHONE ENCOUNTER
Health Maintenance   Topic Date Due    Flu vaccine (1) 09/01/2019    Diabetic retinal exam  05/22/2020 (Originally 3/29/2019)    Hepatitis B vaccine (1 of 3 - Risk 3-dose series) 05/22/2020 (Originally 9/2/2006)    Varicella vaccine (1 of 2 - 2-dose childhood series) 05/22/2020 (Originally 9/2/1988)    DTaP/Tdap/Td vaccine (2 - Tdap) 08/16/2020 (Originally 6/28/2015)    Diabetic foot exam  11/04/2020 (Originally 10/9/2019)    Cervical cancer screen  11/04/2020 (Originally 8/19/2018)    Lipid screen  05/15/2020    A1C test (Diabetic or Prediabetic)  11/20/2020    Diabetic microalbuminuria test  11/20/2020    Potassium monitoring  11/20/2020    Creatinine monitoring  11/20/2020    Shingles Vaccine (1 of 2) 09/02/2037    Pneumococcal 0-64 years Vaccine  Completed    HIV screen  Completed    Hepatitis A vaccine  Aged Out    Hib vaccine  Aged Out    Meningococcal (ACWY) vaccine  Aged Out             (applicable per patient's age: Cancer Screenings, Depression Screening, Fall Risk Screening, Immunizations)    Hemoglobin A1C (%)   Date Value   11/20/2019 8.7 (H)   05/15/2019 7.6 (H)   09/19/2018 7.6 (H)     Microalb/Crt.  Ratio (mcg/mg creat)   Date Value   11/20/2019 CANNOT BE CALCULATED     LDL Cholesterol (mg/dL)   Date Value   05/15/2019 89     AST (U/L)   Date Value   11/20/2019 14     ALT (U/L)   Date Value   11/20/2019 22     BUN (mg/dL)   Date Value   11/20/2019 11      (goal A1C is < 7)   (goal LDL is <100) need 30-50% reduction from baseline     BP Readings from Last 3 Encounters:   11/21/19 118/81   11/04/19 114/77   08/16/19 120/73    (goal /80)      All Future Testing planned in [facility]:  Lab Frequency Next Occurrence       Next Visit Date:  Future Appointments   Date Time Provider Rohini Cabral   2/25/2020  2:20 PM Ag Johnson, APRN - CNP [facility]            Patient Active Problem List:     Dyslipidemia     Insomnia     Anxiety and depression     Morbidly obese ([address] BMI 38.1     Adopted     Bronchitis, mucopurulent recurrent ([address]     Urinary urgency     Female stress incontinence     Controlled type 2 diabetes mellitus without complication, without long-term current use of insulin ([facility])     Nocturia     Frequency of urination     Mixed incontinence

## 2020-02-24 ENCOUNTER — PATIENT MESSAGE (OUTPATIENT)
Dept: PRIMARY CARE CLINIC | Age: 35
End: 2020-02-24

## 2020-03-19 ENCOUNTER — HOSPITAL ENCOUNTER (EMERGENCY)
Age: 35
Discharge: HOME | End: 2020-03-19
Attending: EMERGENCY MEDICINE
Payer: COMMERCIAL

## 2020-03-19 VITALS
DIASTOLIC BLOOD PRESSURE: 82 MMHG | SYSTOLIC BLOOD PRESSURE: 133 MMHG | HEART RATE: 81 BPM | WEIGHT: 220 LBS | TEMPERATURE: 98 F | OXYGEN SATURATION: 97 % | BODY MASS INDEX: 35.51 KG/M2 | RESPIRATION RATE: 16 BRPM

## 2020-03-19 PROCEDURE — 99282 EMERGENCY DEPT VISIT SF MDM: CPT

## 2020-03-19 ASSESSMENT — PAIN DESCRIPTION - FREQUENCY: FREQUENCY: CONTINUOUS

## 2020-03-19 ASSESSMENT — PAIN DESCRIPTION - PROGRESSION: CLINICAL_PROGRESSION: GRADUALLY WORSENING

## 2020-03-19 ASSESSMENT — PAIN DESCRIPTION - LOCATION: LOCATION: GENERALIZED

## 2020-03-19 ASSESSMENT — PAIN DESCRIPTION - PAIN TYPE: TYPE: ACUTE PAIN

## 2020-03-19 ASSESSMENT — PAIN DESCRIPTION - DESCRIPTORS: DESCRIPTORS: ACHING

## 2020-03-19 ASSESSMENT — PAIN SCALES - GENERAL: PAINLEVEL_OUTOF10: 6

## 2020-03-19 ASSESSMENT — PAIN DESCRIPTION - ONSET: ONSET: GRADUAL

## 2020-03-19 NOTE — ED PROVIDER NOTES
not apply route daily 100 each 3    amitriptyline (ELAVIL) 25 MG tablet TAKE 1 TABLET BY MOUTH NIGHTLY 90 tablet 1    vitamin D 1000 units CAPS Take 2 capsules by mouth daily 180 capsule 1    linagliptin-metformin (JENTADUETO) 2.5-1000 MG TABS TAKE 1 TABLET BY MOUTH TWICE DAILY 60 tablet 11    levocetirizine (XYZAL) 5 MG tablet Take 1 tablet by mouth nightly 90 tablet 3    albuterol sulfate HFA (VENTOLIN HFA) 108 (90 Base) MCG/ACT inhaler Inhale 2 puffs into the lungs every 6 hours as needed for Wheezing 1 Inhaler 3    blood glucose test strips (TRUE METRIX BLOOD GLUCOSE TEST) strip 1 each by In Vitro route daily As needed. 100 each 3    glucose monitoring kit (FREESTYLE) monitoring kit 1 kit by Does not apply route daily Substitute as needed for insurance requirements. 1 kit 0    venlafaxine (EFFEXOR XR) 75 MG extended release capsule TAKE 1 CAPSULE BY MOUTH ONCE DAILY 90 capsule 3    Lancets MISC Substitute as needed for insurance requirements. Dx 250.00, testing daily.  100 each 3      ALLERGIES   No Known Allergies   SOCIAL AND FAMILY HISTORY   Social History     Socioeconomic History    Marital status:      Spouse name: None    Number of children: None    Years of education: None    Highest education level: None   Occupational History    None   Social Needs    Financial resource strain: None    Food insecurity     Worry: None     Inability: None    Transportation needs     Medical: None     Non-medical: None   Tobacco Use    Smoking status: Former Smoker     Packs/day: 0.25     Types: Cigarettes    Smokeless tobacco: Never Used    Tobacco comment: 1-2 cigs a day   Substance and Sexual Activity    Alcohol use: Yes     Comment: rarely    Drug use: No    Sexual activity: Yes   Lifestyle    Physical activity     Days per week: None     Minutes per session: None    Stress: None   Relationships    Social connections     Talks on phone: None     Gets together: None     Attends Hoahaoism service: None     Active member of club or organization: None     Attends meetings of clubs or organizations: None     Relationship status: None    Intimate partner violence     Fear of current or ex partner: None     Emotionally abused: None     Physically abused: None     Forced sexual activity: None   Other Topics Concern    None   Social History Narrative    None     Family History   Adopted: Yes        PHYSICAL EXAM   VITAL SIGNS: /82   Pulse 81   Temp 98 °F (36.7 °C) (Tympanic)   Resp 16   Wt 220 lb (99.8 kg)   SpO2 97%   BMI 35.51 kg/m²   Constitutional: Well developed, well nourished, no acute distress  Eyes: Sclera nonicteric, conjunctiva normal   Throat/Face: no swelling  Neck: Supple, shoddy enlarged tonsillar LAD  Respiratory: Lungs bilateral clear  Cardiovascular: Normal rate, normal rhythm, no murmurs  Musculoskeletal: No edema   Neurologic: Awake alert and oriented, and no slurred speech  Integument: Skin is warm and dry, no rash    RADIOLOGY/PROCEDURES   none    ED COURSE & MEDICAL DECISION MAKING   See chart for details of medications given. Differential diagnoses: Meningitis, group A strep, Airway Obstruction, Epiglottitis, Retropharyngeal Abscess, Parapharyngeal Abscess, Pneumonia, Hypoxemia, Dehydration, other. FINAL IMPRESSION   1. Cough    2.  Viral syndrome        PLAN  Outpatient medications, followup, and discharge instructions (see EMR)        Romulo King MD  03/19/20 1475

## 2020-03-19 NOTE — LETTER
[facility] ED  [address] [place] [address] [place] [address]  Phone: [phone]  Fax: [phone]               March 19, 2020    Patient: Fay Zhao   YOB: 1985   Date of Visit: 3/19/2020       To Whom It May Concern:    Raisa Cough was seen and treated in our emergency department on 3/19/2020. She may return to work on 03/23/2020. Madyson Nation       Sincerely,       Parrish Soliman RN         Signature:__________________________________

## 2020-03-20 ENCOUNTER — TELEPHONE (OUTPATIENT)
Dept: PRIMARY CARE CLINIC | Age: 35
End: 2020-03-20

## 2020-05-06 ENCOUNTER — PATIENT MESSAGE (OUTPATIENT)
Dept: PRIMARY CARE CLINIC | Age: 35
End: 2020-05-06

## 2020-06-18 RX ORDER — VENLAFAXINE HYDROCHLORIDE 150 MG/1
CAPSULE, EXTENDED RELEASE ORAL
Qty: 30 CAPSULE | Refills: 0 | Status: SHIPPED | OUTPATIENT
Start: 2020-06-18 | End: 2020-07-13

## 2020-06-18 NOTE — TELEPHONE ENCOUNTER
stress incontinence     Controlled type 2 diabetes mellitus without complication, without long-term current use of insulin (HCC)     Nocturia     Frequency of urination     Mixed incontinence

## 2020-06-19 ENCOUNTER — OFFICE VISIT (OUTPATIENT)
Dept: PRIMARY CARE CLINIC | Age: 35
End: 2020-06-19
Payer: COMMERCIAL

## 2020-06-19 VITALS
SYSTOLIC BLOOD PRESSURE: 99 MMHG | HEART RATE: 76 BPM | DIASTOLIC BLOOD PRESSURE: 61 MMHG | RESPIRATION RATE: 20 BRPM | HEIGHT: 66 IN | WEIGHT: 224.4 LBS | BODY MASS INDEX: 36.07 KG/M2 | TEMPERATURE: 97.8 F

## 2020-06-19 PROBLEM — J20.9 ACUTE BRONCHITIS WITH ASTHMA: Status: ACTIVE | Noted: 2020-06-19

## 2020-06-19 PROBLEM — J45.909 ACUTE BRONCHITIS WITH ASTHMA: Status: ACTIVE | Noted: 2020-06-19

## 2020-06-19 LAB — HBA1C MFR BLD: 8.7 %

## 2020-06-19 PROCEDURE — G8417 CALC BMI ABV UP PARAM F/U: HCPCS | Performed by: NURSE PRACTITIONER

## 2020-06-19 PROCEDURE — 1036F TOBACCO NON-USER: CPT | Performed by: NURSE PRACTITIONER

## 2020-06-19 PROCEDURE — 3052F HG A1C>EQUAL 8.0%<EQUAL 9.0%: CPT | Performed by: NURSE PRACTITIONER

## 2020-06-19 PROCEDURE — 2022F DILAT RTA XM EVC RTNOPTHY: CPT | Performed by: NURSE PRACTITIONER

## 2020-06-19 PROCEDURE — 99214 OFFICE O/P EST MOD 30 MIN: CPT | Performed by: NURSE PRACTITIONER

## 2020-06-19 PROCEDURE — G8427 DOCREV CUR MEDS BY ELIG CLIN: HCPCS | Performed by: NURSE PRACTITIONER

## 2020-06-19 PROCEDURE — 83036 HEMOGLOBIN GLYCOSYLATED A1C: CPT | Performed by: NURSE PRACTITIONER

## 2020-06-19 RX ORDER — BUSPIRONE HYDROCHLORIDE 15 MG/1
15 TABLET ORAL 2 TIMES DAILY
Qty: 180 TABLET | Refills: 3 | Status: SHIPPED | OUTPATIENT
Start: 2020-06-19 | End: 2021-04-22

## 2020-06-19 RX ORDER — INSULIN GLARGINE 100 [IU]/ML
30 INJECTION, SOLUTION SUBCUTANEOUS NIGHTLY
Qty: 5 PEN | Refills: 5 | Status: SHIPPED | OUTPATIENT
Start: 2020-06-19 | End: 2020-07-27

## 2020-06-19 SDOH — ECONOMIC STABILITY: FOOD INSECURITY: WITHIN THE PAST 12 MONTHS, THE FOOD YOU BOUGHT JUST DIDN'T LAST AND YOU DIDN'T HAVE MONEY TO GET MORE.: PATIENT DECLINED

## 2020-06-19 SDOH — ECONOMIC STABILITY: FOOD INSECURITY: WITHIN THE PAST 12 MONTHS, YOU WORRIED THAT YOUR FOOD WOULD RUN OUT BEFORE YOU GOT MONEY TO BUY MORE.: PATIENT DECLINED

## 2020-06-19 SDOH — ECONOMIC STABILITY: TRANSPORTATION INSECURITY
IN THE PAST 12 MONTHS, HAS LACK OF TRANSPORTATION KEPT YOU FROM MEETINGS, WORK, OR FROM GETTING THINGS NEEDED FOR DAILY LIVING?: PATIENT DECLINED

## 2020-06-19 SDOH — ECONOMIC STABILITY: INCOME INSECURITY: HOW HARD IS IT FOR YOU TO PAY FOR THE VERY BASICS LIKE FOOD, HOUSING, MEDICAL CARE, AND HEATING?: NOT HARD AT ALL

## 2020-06-19 SDOH — ECONOMIC STABILITY: TRANSPORTATION INSECURITY
IN THE PAST 12 MONTHS, HAS THE LACK OF TRANSPORTATION KEPT YOU FROM MEDICAL APPOINTMENTS OR FROM GETTING MEDICATIONS?: PATIENT DECLINED

## 2020-06-19 ASSESSMENT — ENCOUNTER SYMPTOMS
DIARRHEA: 0
WHEEZING: 0
SINUS PAIN: 0
CHEST TIGHTNESS: 0
RHINORRHEA: 0
VOMITING: 0
TROUBLE SWALLOWING: 0
VISUAL CHANGE: 0
SHORTNESS OF BREATH: 0
CONSTIPATION: 0
FREQUENT THROAT CLEARING: 0
SORE THROAT: 0
ABDOMINAL PAIN: 0
HOARSE VOICE: 0
DIFFICULTY BREATHING: 0
SPUTUM PRODUCTION: 0
HEMOPTYSIS: 0
NAUSEA: 0
COUGH: 1
HEARTBURN: 0

## 2020-06-19 ASSESSMENT — PATIENT HEALTH QUESTIONNAIRE - PHQ9
SUM OF ALL RESPONSES TO PHQ9 QUESTIONS 1 & 2: 0
SUM OF ALL RESPONSES TO PHQ QUESTIONS 1-9: 0
1. LITTLE INTEREST OR PLEASURE IN DOING THINGS: 0
SUM OF ALL RESPONSES TO PHQ QUESTIONS 1-9: 0
2. FEELING DOWN, DEPRESSED OR HOPELESS: 0

## 2020-06-19 NOTE — PATIENT INSTRUCTIONS
more slowly than it would otherwise. Eat from all food groups  · Eat at least three meals a day. · Plan meals to include food from all the food groups. The food groups include grains, fruits, dairy, proteins, and vegetables. · Talk to your dietitian or diabetes educator about ways to add limited amounts of sweets into your meal plan. · If you drink alcohol, talk to your doctor. It may not be recommended when you are taking certain diabetes medicines. Where can you learn more? Go to [email or web address]. org and sign in to your [employer] account. Enter J398 in the [employer] box to learn more about \"Counting Carbohydrates: Care Instructions. \"     If you do not have an account, please click on the \"Sign Up Now\" link. Current as of: December 20, 2019               Content Version: 12.5  © 8025-8651 [employer]. Care instructions adapted under license by [facility] ([facility]). If you have questions about a medical condition or this instruction, always ask your healthcare professional. [address] any warranty or liability for your use of this information.

## 2020-06-19 NOTE — PROGRESS NOTES
unhealthy diet. When asked about meal planning, she reported none. She has had a previous visit with a dietitian. She never participates in exercise. There is no change in her home blood glucose trend. Her breakfast blood glucose range is generally >200 mg/dl. An ACE inhibitor/angiotensin II receptor blocker is being taken. She does not see a podiatrist.Eye exam is current. Asthma   She complains of cough. There is no chest tightness, difficulty breathing, frequent throat clearing, hemoptysis, hoarse voice, shortness of breath, sputum production or wheezing. This is a chronic problem. The current episode started more than 1 year ago. The problem occurs intermittently. The problem has been unchanged. The cough is non-productive. Pertinent negatives include no appetite change, chest pain, ear congestion, ear pain, fever, headaches, heartburn, malaise/fatigue, myalgias, nasal congestion, rhinorrhea, sneezing, sore throat, sweats or trouble swallowing. Her symptoms are aggravated by URI, strenuous activity, pollen, exposure to fumes, change in weather and climbing stairs. Her symptoms are alleviated by beta-agonist and rest. She reports significant improvement on treatment. Risk factors for lung disease include smoking/tobacco exposure. Her past medical history is significant for asthma and bronchitis. There is no history of COPD. Mental Health Problem   The primary symptoms include dysphoric mood. The primary symptoms do not include delusions, hallucinations, bizarre behavior, disorganized speech, negative symptoms or somatic symptoms. The current episode started more than 1 month ago. This is a chronic problem. The onset of the illness is precipitated by emotional stress. The degree of incapacity that she is experiencing as a consequence of her illness is moderate. Sequelae of the illness include harmed interpersonal relations.  Additional symptoms of the illness include anhedonia, attention impairment and distractible. Additional symptoms of the illness do not include insomnia, hypersomnia, appetite change, unexpected weight change, fatigue, agitation, psychomotor retardation, feelings of worthlessness, euphoric mood, increased goal-directed activity, flight of ideas, inflated self-esteem, decreased need for sleep, poor judgment, visual change, headaches, abdominal pain or seizures. She does not admit to suicidal ideas. She does not have a plan to commit suicide. She does not contemplate harming herself. She has not already injured self. She does not contemplate injuring another person. She has not already  injured another person. Risk factors that are present for mental illness include a history of mental illness and a family history of mental illness. Past Medical History:     Past Medical History:   Diagnosis Date    Acute bronchitis with asthma 6/19/2020    Anxiety and depression     Depression     Female stress incontinence 11/23/2015    Obesity     Type II or unspecified type diabetes mellitus without mention of complication, not stated as uncontrolled     Wears glasses       Reviewed all health maintenance requirements and ordered appropriate tests  Health Maintenance Due   Topic Date Due    Lipid screen  05/15/2020       Past Surgical History:     Past Surgical History:   Procedure Laterality Date    TUBAL LIGATION      WISDOM TOOTH EXTRACTION          Medications:       Prior to Admission medications    Medication Sig Start Date End Date Taking?  Authorizing Provider   busPIRone (BUSPAR) 15 MG tablet Take 15 mg by mouth 2 times daily 6/19/20  Yes NINA Ruelas CNP   insulin glargine (BASAGLAR KWIKPEN) 100 UNIT/ML injection pen Inject 30 Units into the skin nightly 6/19/20  Yes NINA Ruelas CNP   venlafaxine (EFFEXOR XR) 150 MG extended release capsule Take 1 capsule by mouth once daily 6/18/20  Yes Josette Johnson, APRN - CNP   amitriptyline (ELAVIL) 25 MG tablet Take 1 tablet by mouth nightly 6/8/20  Yes Miguel Johnson, APRN - CNP   Cholecalciferol (VITAMIN D-3) 25 MCG (1000 UT) CAPS Take 2 capsules by mouth once daily 6/8/20  Yes Miguel Johnson APRN - CNP   atorvastatin (LIPITOR) 20 MG tablet Take 1 tablet by mouth once daily 5/4/20  Yes Miguel Johnson, APRN - CNP   STEGLATRO 15 MG TABS TAKE 1 TABLET BY MOUTH ONCE DAILY 2/6/20  Yes Miguel Johnson, APRN - CNP   montelukast (SINGULAIR) 10 MG tablet TAKE 1 TABLET BY MOUTH ONCE DAILY 1/10/20  Yes Miguel Johnson, APRN - CNP   lisinopril (PRINIVIL;ZESTRIL) 2.5 MG tablet TAKE 1 TABLET BY MOUTH ONCE DAILY 11/26/19  Yes Miguel Johnson APRN - CNP   Insulin Pen Needle (PEN NEEDLES) 31G X 6 MM MISC 1 each by Does not apply route daily 11/21/19  Yes Miguel Johnson APRN - LINN   linagliptin-metformin (JENTADUETO) 2.5-1000 MG TABS TAKE 1 TABLET BY MOUTH TWICE DAILY 9/9/19  Yes Miguel Johnson APRN - CNP   venlafaxine (EFFEXOR XR) 75 MG extended release capsule TAKE 1 CAPSULE BY MOUTH ONCE DAILY 8/8/19  Yes NINA Brown CNP   albuterol sulfate HFA (VENTOLIN HFA) 108 (90 Base) MCG/ACT inhaler Inhale 2 puffs into the lungs every 6 hours as needed for Wheezing 5/14/19  Yes Miguel Johnson APRN - CNP   blood glucose test strips (TRUE METRIX BLOOD GLUCOSE TEST) strip 1 each by In Vitro route daily As needed. 9/6/18  Yes NINA Bah CNP   glucose monitoring kit (FREESTYLE) monitoring kit 1 kit by Does not apply route daily Substitute as needed for insurance requirements. 8/10/18  Yes Miguel Johnson APRN - CNP   levocetirizine (XYZAL) 5 MG tablet Take 1 tablet by mouth nightly  Patient not taking: Reported on 6/19/2020 6/8/20   NINA Bah - CNP   Lancets MISC Substitute as needed for insurance requirements. Dx 250.00, testing daily. 4/4/17 12/18/18  NINA Bah CNP        Allergies:       Patient has no known allergies. Social History:     Tobacco:    reports that she has quit smoking. Her smoking use included cigarettes.  She

## 2020-06-29 ENCOUNTER — TELEPHONE (OUTPATIENT)
Dept: PRIMARY CARE CLINIC | Age: 35
End: 2020-06-29

## 2020-06-29 RX ORDER — ERTUGLIFLOZIN 15 MG/1
TABLET, FILM COATED ORAL
Qty: 90 TABLET | Refills: 1 | Status: SHIPPED | OUTPATIENT
Start: 2020-06-29 | End: 2020-12-01 | Stop reason: SDUPTHER

## 2020-07-13 RX ORDER — VENLAFAXINE HYDROCHLORIDE 75 MG/1
CAPSULE, EXTENDED RELEASE ORAL
Qty: 90 CAPSULE | Refills: 3 | Status: SHIPPED | OUTPATIENT
Start: 2020-07-13 | End: 2021-07-23

## 2020-07-13 NOTE — TELEPHONE ENCOUNTER
Patient Active Problem List:     Dyslipidemia     Insomnia     Anxiety and depression     Adopted     Urinary urgency     Female stress incontinence     Controlled type 2 diabetes mellitus without complication, without long-term current use of insulin (HCC)     Nocturia     Frequency of urination     Mixed incontinence     Acute bronchitis with asthma

## 2020-07-14 ENCOUNTER — HOSPITAL ENCOUNTER (OUTPATIENT)
Age: 35
Discharge: HOME | End: 2020-07-14
Payer: COMMERCIAL

## 2020-07-14 LAB
ABSOLUTE EOS #: 0.37 K/UL (ref 0–0.44)
ABSOLUTE IMMATURE GRANULOCYTE: 0.03 K/UL (ref 0–0.3)
ABSOLUTE LYMPH #: 2.97 K/UL (ref 1.1–3.7)
ABSOLUTE MONO #: 0.53 K/UL (ref 0.1–1.2)
ALT SERPL-CCNC: 18 U/L (ref 5–33)
AST SERPL-CCNC: 15 U/L
BASOPHILS # BLD: 1 % (ref 0–2)
BASOPHILS ABSOLUTE: 0.08 K/UL (ref 0–0.2)
CHOLESTEROL/HDL RATIO: 4.1
CHOLESTEROL: 131 MG/DL
CREATININE URINE: 59.1 MG/DL (ref 28–217)
DIFFERENTIAL TYPE: NORMAL
EOSINOPHILS RELATIVE PERCENT: 4 % (ref 1–4)
HCT VFR BLD CALC: 42.5 % (ref 36.3–47.1)
HDLC SERPL-MCNC: 32 MG/DL
HEMOGLOBIN: 13.3 G/DL (ref 11.9–15.1)
IMMATURE GRANULOCYTES: 0 %
LDL CHOLESTEROL: 53 MG/DL (ref 0–130)
LYMPHOCYTES # BLD: 33 % (ref 24–43)
MCH RBC QN AUTO: 29.6 PG (ref 25.2–33.5)
MCHC RBC AUTO-ENTMCNC: 31.3 G/DL (ref 28.4–34.8)
MCV RBC AUTO: 94.7 FL (ref 82.6–102.9)
MICROALBUMIN/CREAT 24H UR: <12 MG/L
MICROALBUMIN/CREAT UR-RTO: NORMAL MCG/MG CREAT
MONOCYTES # BLD: 6 % (ref 3–12)
NRBC AUTOMATED: 0 PER 100 WBC
PDW BLD-RTO: 12.4 % (ref 11.8–14.4)
PLATELET # BLD: 418 K/UL (ref 138–453)
PLATELET ESTIMATE: NORMAL
PMV BLD AUTO: 8.7 FL (ref 8.1–13.5)
RBC # BLD: 4.49 M/UL (ref 3.95–5.11)
RBC # BLD: NORMAL 10*6/UL
SEG NEUTROPHILS: 56 % (ref 36–65)
SEGMENTED NEUTROPHILS ABSOLUTE COUNT: 4.98 K/UL (ref 1.5–8.1)
TRIGL SERPL-MCNC: 229 MG/DL
VITAMIN D 25-HYDROXY: 31.2 NG/ML (ref 30–100)
VLDLC SERPL CALC-MCNC: ABNORMAL MG/DL (ref 1–30)
WBC # BLD: 9 K/UL (ref 3.5–11.3)
WBC # BLD: NORMAL 10*3/UL

## 2020-07-14 PROCEDURE — 84450 TRANSFERASE (AST) (SGOT): CPT

## 2020-07-14 PROCEDURE — 82306 VITAMIN D 25 HYDROXY: CPT

## 2020-07-14 PROCEDURE — 85025 COMPLETE CBC W/AUTO DIFF WBC: CPT

## 2020-07-14 PROCEDURE — 80061 LIPID PANEL: CPT

## 2020-07-14 PROCEDURE — 84460 ALANINE AMINO (ALT) (SGPT): CPT

## 2020-07-14 PROCEDURE — 36415 COLL VENOUS BLD VENIPUNCTURE: CPT

## 2020-07-14 PROCEDURE — 82570 ASSAY OF URINE CREATININE: CPT

## 2020-07-14 PROCEDURE — 82043 UR ALBUMIN QUANTITATIVE: CPT

## 2020-07-17 ENCOUNTER — HOSPITAL ENCOUNTER (EMERGENCY)
Age: 35
Discharge: HOME | End: 2020-07-17
Attending: EMERGENCY MEDICINE
Payer: COMMERCIAL

## 2020-07-17 ENCOUNTER — PATIENT MESSAGE (OUTPATIENT)
Dept: PRIMARY CARE CLINIC | Age: 35
End: 2020-07-17

## 2020-07-17 VITALS
RESPIRATION RATE: 20 BRPM | WEIGHT: 210 LBS | SYSTOLIC BLOOD PRESSURE: 118 MMHG | OXYGEN SATURATION: 99 % | DIASTOLIC BLOOD PRESSURE: 78 MMHG | TEMPERATURE: 97.7 F | BODY MASS INDEX: 33.75 KG/M2 | HEIGHT: 66 IN | HEART RATE: 74 BPM

## 2020-07-17 PROCEDURE — 96375 TX/PRO/DX INJ NEW DRUG ADDON: CPT

## 2020-07-17 PROCEDURE — 6360000002 HC RX W HCPCS: Performed by: EMERGENCY MEDICINE

## 2020-07-17 PROCEDURE — 99283 EMERGENCY DEPT VISIT LOW MDM: CPT

## 2020-07-17 PROCEDURE — 96374 THER/PROPH/DIAG INJ IV PUSH: CPT

## 2020-07-17 PROCEDURE — 2580000003 HC RX 258: Performed by: EMERGENCY MEDICINE

## 2020-07-17 RX ORDER — DIPHENHYDRAMINE HYDROCHLORIDE 50 MG/ML
25 INJECTION INTRAMUSCULAR; INTRAVENOUS ONCE
Status: COMPLETED | OUTPATIENT
Start: 2020-07-17 | End: 2020-07-17

## 2020-07-17 RX ORDER — 0.9 % SODIUM CHLORIDE 0.9 %
1000 INTRAVENOUS SOLUTION INTRAVENOUS ONCE
Status: COMPLETED | OUTPATIENT
Start: 2020-07-17 | End: 2020-07-17

## 2020-07-17 RX ORDER — PROCHLORPERAZINE EDISYLATE 5 MG/ML
10 INJECTION INTRAMUSCULAR; INTRAVENOUS ONCE
Status: COMPLETED | OUTPATIENT
Start: 2020-07-17 | End: 2020-07-17

## 2020-07-17 RX ORDER — KETOROLAC TROMETHAMINE 15 MG/ML
30 INJECTION, SOLUTION INTRAMUSCULAR; INTRAVENOUS ONCE
Status: COMPLETED | OUTPATIENT
Start: 2020-07-17 | End: 2020-07-17

## 2020-07-17 RX ADMIN — SODIUM CHLORIDE 1000 ML: 9 INJECTION, SOLUTION INTRAVENOUS at 16:23

## 2020-07-17 RX ADMIN — KETOROLAC TROMETHAMINE 30 MG: 15 INJECTION, SOLUTION INTRAMUSCULAR; INTRAVENOUS at 16:24

## 2020-07-17 RX ADMIN — PROCHLORPERAZINE EDISYLATE 10 MG: 5 INJECTION INTRAMUSCULAR; INTRAVENOUS at 16:24

## 2020-07-17 RX ADMIN — DIPHENHYDRAMINE HYDROCHLORIDE 25 MG: 50 INJECTION, SOLUTION INTRAMUSCULAR; INTRAVENOUS at 16:23

## 2020-07-17 ASSESSMENT — PAIN DESCRIPTION - LOCATION
LOCATION: HEAD
LOCATION: HEAD

## 2020-07-17 ASSESSMENT — PAIN DESCRIPTION - FREQUENCY
FREQUENCY: CONTINUOUS
FREQUENCY: CONTINUOUS

## 2020-07-17 ASSESSMENT — PAIN DESCRIPTION - PAIN TYPE
TYPE: ACUTE PAIN
TYPE: ACUTE PAIN

## 2020-07-17 ASSESSMENT — PAIN SCALES - GENERAL
PAINLEVEL_OUTOF10: 3
PAINLEVEL_OUTOF10: 6

## 2020-07-17 ASSESSMENT — PAIN DESCRIPTION - DESCRIPTORS
DESCRIPTORS: THROBBING
DESCRIPTORS: ACHING

## 2020-07-17 NOTE — ED PROVIDER NOTES
[facility] [place] [facility]  [facility]   Chief Complaint   Patient presents with    Emesis     onset Wednesday    Headache     onset wednesday      HPI   Jayden Carcamo is a 30 y.o. female who presents with gradual onset of a headache since onset 2 days ago. The duration has been constant. The quality of the headache is severe and in the front of the head. It is similar bt worse than previous headaches she has had. Pain is localized in the top half of  of the head. The patient has associated no other comlaints     REVIEW OF SYSTEMS   Neurologic: Denies LOC or stiff neck  Cardiac: Denies Chest Pain, Denies syncope  Respiratory: Denies cough or difficulty breathing  GI: Denies Bloody Stool or Diarrhea  : Denies Dysuria or Hematuria  General: Denies Fever  All other review of systems otherwise negative.     PAST MEDICAL & SURGICAL HISTORY   Past Medical History:   Diagnosis Date    Acute bronchitis with asthma 6/19/2020    Anxiety and depression     Depression     Female stress incontinence 11/23/2015    Obesity     Type II or unspecified type diabetes mellitus without mention of complication, not stated as uncontrolled     Wears glasses      Past Surgical History:   Procedure Laterality Date    TUBAL LIGATION      WISDOM TOOTH EXTRACTION        CURRENT MEDICATIONS   Current Outpatient Rx   Medication Sig Dispense Refill    venlafaxine (EFFEXOR XR) 150 MG extended release capsule Take 1 capsule by mouth once daily 90 capsule 3    levocetirizine (XYZAL) 5 MG tablet Take 1 tablet by mouth nightly 90 tablet 3    amitriptyline (ELAVIL) 25 MG tablet Take 1 tablet by mouth nightly 90 tablet 3    Cholecalciferol (VITAMIN D-3) 25 MCG (1000 UT) CAPS Take 2 capsules by mouth once daily 180 capsule 3    venlafaxine (EFFEXOR XR) 75 MG extended release capsule Take 1 capsule by mouth once daily 90 capsule 3    atorvastatin (LIPITOR) 20 MG tablet Take 1 tablet by mouth once daily 90 tablet 3    STEGLATRO 15 MG TABS Take 1 tablet by mouth once daily 90 tablet 1    busPIRone (BUSPAR) 15 MG tablet Take 15 mg by mouth 2 times daily 180 tablet 3    montelukast (SINGULAIR) 10 MG tablet TAKE 1 TABLET BY MOUTH ONCE DAILY 90 tablet 3    lisinopril (PRINIVIL;ZESTRIL) 2.5 MG tablet TAKE 1 TABLET BY MOUTH ONCE DAILY 90 tablet 3    Insulin Pen Needle (PEN NEEDLES) 31G X 6 MM MISC 1 each by Does not apply route daily 100 each 3    linagliptin-metformin (JENTADUETO) 2.5-1000 MG TABS TAKE 1 TABLET BY MOUTH TWICE DAILY 60 tablet 11    blood glucose test strips (TRUE METRIX BLOOD GLUCOSE TEST) strip 1 each by In Vitro route daily As needed. 100 each 3    glucose monitoring kit (FREESTYLE) monitoring kit 1 kit by Does not apply route daily Substitute as needed for insurance requirements. 1 kit 0    insulin glargine (BASAGLAR KWIKPEN) 100 UNIT/ML injection pen Inject 30 Units into the skin nightly 5 pen 5    albuterol sulfate HFA (VENTOLIN HFA) 108 (90 Base) MCG/ACT inhaler Inhale 2 puffs into the lungs every 6 hours as needed for Wheezing 1 Inhaler 3    Lancets MISC Substitute as needed for insurance requirements. Dx 250.00, testing daily.  100 each 3      ALLERGIES   No Known Allergies   SOCIAL AND FAMILY HISTORY   Social History     Socioeconomic History    Marital status:      Spouse name: None    Number of children: None    Years of education: None    Highest education level: None   Occupational History    None   Social Needs    Financial resource strain: Not hard at all   [place]-Roman insecurity     Worry: Patient refused     Inability: Patient refused    Transportation needs     Medical: Patient refused     Non-medical: Patient refused   Tobacco Use    Smoking status: Current Some Day Smoker     Packs/day: 0.25     Types: Cigarettes    Smokeless tobacco: Never Used    Tobacco comment: 1-2 cigs a day   Substance and Sexual Activity    Alcohol use: Yes     Comment: rarely  Drug use: Yes     Types: Marijuana    Sexual activity: Yes   Lifestyle    Physical activity     Days per week: None     Minutes per session: None    Stress: None   Relationships    Social connections     Talks on phone: None     Gets together: None     Attends Jainism service: None     Active member of club or organization: None     Attends meetings of clubs or organizations: None     Relationship status: None    Intimate partner violence     Fear of current or ex partner: None     Emotionally abused: None     Physically abused: None     Forced sexual activity: None   Other Topics Concern    None   Social History Narrative    None     Family History   Adopted: Yes        PHYSICAL EXAM   VITAL SIGNS: /75   Pulse 69   Temp 97.7 °F (36.5 °C) (Oral)   Resp 18   Ht 5' 6\" (1.676 m)   Wt 210 lb (95.3 kg)   LMP 07/08/2020   SpO2 99%   BMI 33.89 kg/m²   Constitutional: Well developed, well nourished, no acute distress   Eyes: Pupils equally round and react to light, extraocular movement are intact  Vascular: No temporal artery tenderness  HENT: Atraumatic, moist mucus membranes, airway patent  Neck: supple, no JVD   Respiratory: no retractions   GI: Soft, nontender, normal bowel sounds  Musculoskeletal: No edema, no deformities  Integument: Well hydrated, no petechiae   Neurologic: Alert & oriented, no slurred speech, normal gross motor, normal finger to nose test bilaterally, patellar reflexes equal bilaterally  Psych: Pleasant affect, no hallucinationst     RADIOLOGY/PROCEDURES   No orders to display     ED COURSE & MEDICAL DECISION MAKING   Pertinent Labs & Imaging studies reviewed and interpreted. (See chart for details)   See EMR for medications prescribed  Vitals:    07/17/20 1541   BP: 115/75   Pulse: 69   Resp: 18   Temp: 97.7 °F (36.5 °C)   SpO2: 99%   ED COURSE & MEDICAL DECISION MAKING   Pertinent Labs & Imaging studies reviewed and interpreted.  (See chart for details)     Differential

## 2020-07-18 ENCOUNTER — CARE COORDINATION (OUTPATIENT)
Dept: CARE COORDINATION | Age: 35
End: 2020-07-18

## 2020-07-20 NOTE — CARE COORDINATION
Calls to patient for COVID-19 monitoring due to ED   Visit 7/17/20. Patient unavailable. Messages left asking  For return call to [phone].

## 2020-07-21 ENCOUNTER — TELEPHONE (OUTPATIENT)
Dept: PRIMARY CARE CLINIC | Age: 35
End: 2020-07-21

## 2020-07-21 RX ORDER — ONDANSETRON 4 MG/1
4 TABLET, FILM COATED ORAL EVERY 8 HOURS PRN
Qty: 30 TABLET | Refills: 0 | Status: SHIPPED | OUTPATIENT
Start: 2020-07-21 | End: 2021-11-01

## 2020-07-21 NOTE — TELEPHONE ENCOUNTER
[facility] Transitions Initial Follow Up Call    Outreach made within 2 business days of discharge: Yes  L/M FOR PT TO CALL BACK   Patient: Cookie Shrestha Patient : 1985   MRN: [id number]  Reason for Admission: There are no discharge diagnoses documented for the most recent discharge. Discharge Date: 20       Spoke with: patient    Discharge department/facility: [facility] er    TCM Interactive Patient Contact:  Was patient able to fill all prescriptions:n/a  Was patient instructed to bring all medications to the follow-up visit: n/a  Is patient taking all medications as directed in the discharge summary? Yes  Does patient understand their discharge instructions: Yes  Does patient have questions or concerns that need addressed prior to 7-14 day follow up office visit: yes - WANTS TO KNOW IF SHE CAN HAVE MEDICATION CALLED IN TO HELP WITH HER MIGRAINES AND NAUSEA TO TAKE AS NEEDED WHEN SHE HAS A MIGRAINE. PT STATES SHE HAS BEEN HAVING MORE FREQUENT HEADACHES AND WILL HAVE NAUSEA AND VOMITING.     Scheduled appointment with PCP within 7-14 days    Follow Up  Future Appointments   Date Time Provider Rohini Cabral   2020 11:00 AM Julian Johnson APRN - CNP [facility]       Gerda Severs

## 2020-07-27 ENCOUNTER — TELEMEDICINE (OUTPATIENT)
Dept: PRIMARY CARE CLINIC | Age: 35
End: 2020-07-27
Payer: COMMERCIAL

## 2020-07-27 PROCEDURE — G8427 DOCREV CUR MEDS BY ELIG CLIN: HCPCS | Performed by: NURSE PRACTITIONER

## 2020-07-27 PROCEDURE — 99214 OFFICE O/P EST MOD 30 MIN: CPT | Performed by: NURSE PRACTITIONER

## 2020-07-27 RX ORDER — INSULIN GLARGINE 100 [IU]/ML
30 INJECTION, SOLUTION SUBCUTANEOUS NIGHTLY
COMMUNITY
End: 2020-12-01 | Stop reason: SDUPTHER

## 2020-07-27 RX ORDER — TOPIRAMATE 25 MG/1
25 TABLET ORAL NIGHTLY
Qty: 30 TABLET | Refills: 0 | Status: SHIPPED | OUTPATIENT
Start: 2020-07-27 | End: 2020-08-17

## 2020-07-27 ASSESSMENT — ENCOUNTER SYMPTOMS
COUGH: 0
NAUSEA: 1
SORE THROAT: 0
PHOTOPHOBIA: 1
CONSTIPATION: 0
DIARRHEA: 0
VOMITING: 0
RHINORRHEA: 0
ABDOMINAL PAIN: 0
WHEEZING: 0
SHORTNESS OF BREATH: 0

## 2020-07-27 NOTE — PROGRESS NOTES
2020    TELEHEALTH EVALUATION -- Audio/Visual (During [place]-61 public health emergency)    HPI:    Gomez Bryson (:  1985) has requested an audio/video evaluation for the following concern(s):    Anjali Chávez is here today via video for a virtual visit. Headache    This is a recurrent problem. The current episode started more than 1 month ago. The problem occurs intermittently. The problem has been gradually worsening. The pain is located in the bilateral, retro-orbital and frontal region. The pain does not radiate. The pain quality is similar to prior headaches. The quality of the pain is described as squeezing, throbbing and aching. The pain is at a severity of 6/10. The pain is moderate. Associated symptoms include nausea and photophobia. Pertinent negatives include no abdominal pain, coughing, dizziness, fever, neck pain, numbness, rhinorrhea, seizures, sore throat, vomiting or weakness. The symptoms are aggravated by emotional stress. She has tried NSAIDs and acetaminophen for the symptoms. The treatment provided mild relief. Her past medical history is significant for migraine headaches and migraines in the family. Review of Systems   Constitutional: Negative for chills, fatigue and fever. HENT: Negative for congestion, rhinorrhea and sore throat. Eyes: Positive for photophobia. Negative for visual disturbance. Respiratory: Negative for cough, shortness of breath and wheezing. Cardiovascular: Negative for chest pain and palpitations. Gastrointestinal: Positive for nausea. Negative for abdominal pain, constipation, diarrhea and vomiting. Genitourinary: Negative for difficulty urinating and dysuria. Musculoskeletal: Negative for gait problem, neck pain and neck stiffness. Skin: Negative for rash. Neurological: Positive for headaches. Negative for dizziness, tremors, seizures, syncope, facial asymmetry, speech difficulty, weakness, light-headedness and numbness. Psychiatric/Behavioral: Negative for confusion, decreased concentration and sleep disturbance. Prior to Visit Medications    Medication Sig Taking?  Authorizing Provider   insulin glargine (LANTUS) 100 UNIT/ML injection vial Inject 30 Units into the skin nightly Yes Historical Provider, MD   topiramate (TOPAMAX) 25 MG tablet Take 1 tablet by mouth nightly Yes NINA Joshi CNP   ondansetron (ZOFRAN) 4 MG tablet Take 1 tablet by mouth every 8 hours as needed for Nausea or Vomiting Yes NINA Joshi CNP   venlafaxine (EFFEXOR XR) 150 MG extended release capsule Take 1 capsule by mouth once daily Yes NINA Joshi CNP   levocetirizine (XYZAL) 5 MG tablet Take 1 tablet by mouth nightly Yes NINA Joshi CNP   amitriptyline (ELAVIL) 25 MG tablet Take 1 tablet by mouth nightly Yes NINA Joshi CNP   Cholecalciferol (VITAMIN D-3) 25 MCG (1000 UT) CAPS Take 2 capsules by mouth once daily Yes NINA Joshi CNP   venlafaxine (EFFEXOR XR) 75 MG extended release capsule Take 1 capsule by mouth once daily Yes NINA Joshi CNP   atorvastatin (LIPITOR) 20 MG tablet Take 1 tablet by mouth once daily Yes NINA Joshi CNP   STEGLATRO 15 MG TABS Take 1 tablet by mouth once daily Yes NINA Joshi CNP   busPIRone (BUSPAR) 15 MG tablet Take 15 mg by mouth 2 times daily Yes NINA Joshi CNP   montelukast (SINGULAIR) 10 MG tablet TAKE 1 TABLET BY MOUTH ONCE DAILY Yes NINA Joshi CNP   lisinopril (PRINIVIL;ZESTRIL) 2.5 MG tablet TAKE 1 TABLET BY MOUTH ONCE DAILY Yes NINA Joshi CNP   Insulin Pen Needle (PEN NEEDLES) 31G X 6 MM MISC 1 each by Does not apply route daily Yes NINA Joshi CNP   linagliptin-metformin (JENTADUETO) 2.5-1000 MG TABS TAKE 1 TABLET BY MOUTH TWICE DAILY Yes NINA Joshi CNP   albuterol sulfate HFA (VENTOLIN HFA) 108 (90 Base) MCG/ACT inhaler Inhale 2 puffs into the lungs every 6 hours as status  [x] Alert and awake  [x] Oriented to person/place/time [x]Able to follow commands      Eyes:  EOM    [x]  Normal  [] Abnormal-  Sclera  [x]  Normal  [] Abnormal -         Discharge [x]  None visible  [] Abnormal -    HENT:   [x] Normocephalic, atraumatic. [] Abnormal   [x] Mouth/Throat: Mucous membranes are moist.     External Ears [x] Normal  [] Abnormal-     Neck: [x] No visualized mass     Pulmonary/Chest: [x] Respiratory effort normal.  [x] No visualized signs of difficulty breathing or respiratory distress        [] Abnormal-      Musculoskeletal:   [] Normal gait with no signs of ataxia         [x] Normal range of motion of neck        [] Abnormal-       Neurological:        [x] No Facial Asymmetry (Cranial nerve 7 motor function) (limited exam to video visit)          [x] No gaze palsy        [] Abnormal-         Skin:        [x] No significant exanthematous lesions or discoloration noted on facial skin         [] Abnormal-            Psychiatric:       [x] Normal Affect [x] No Hallucinations        [] Abnormal-     Other pertinent observable physical exam findings-     ASSESSMENT/PLAN:  1. Chronic migraine without aura without status migrainosus, not intractable    We will trial Topamax at 25 mg for 1 week and then increase to 50 mg on week 2. She will follow-up via virtual visit in 3 weeks. Call sooner if any worsening.      - topiramate (TOPAMAX) 25 MG tablet; Take 1 tablet by mouth nightly  Dispense: 30 tablet; Refill: 0      Return in about 3 weeks (around 8/17/2020) for Recheck. Yulissa Lord is a 30 y.o. female being evaluated by a Virtual Visit (video visit) encounter to address concerns as mentioned above. A caregiver was present when appropriate. Due to this being a TeleHealth encounter (During [facility]- public health emergency), evaluation of the following organ systems was limited: Vitals/Constitutional/EENT/Resp/CV/GI//MS/Neuro/Skin/Heme-Lymph-Imm.   Pursuant to the emergency

## 2020-08-17 RX ORDER — TOPIRAMATE 25 MG/1
25 TABLET ORAL NIGHTLY
Qty: 30 TABLET | Refills: 0 | Status: SHIPPED | OUTPATIENT
Start: 2020-08-17 | End: 2020-08-28 | Stop reason: SDUPTHER

## 2020-08-17 NOTE — TELEPHONE ENCOUNTER
Health Maintenance   Topic Date Due    Varicella vaccine (1 of 2 - 2-dose childhood series) [birth year]/1988    DTaP/Tdap/Td vaccine (2 - Tdap) 06/28/2015    Diabetic foot exam  11/04/2020 (Originally 10/9/2019)    Cervical cancer screen  11/04/2020 (Originally 8/19/2018)    Hepatitis B vaccine (1 of 3 - Risk 3-dose series) 06/19/2021 (Originally 9/2/2006)    Diabetic retinal exam  06/29/2021 (Originally 3/29/2019)    Flu vaccine (1) 09/01/2020    Potassium monitoring  11/20/2020    Creatinine monitoring  11/20/2020    A1C test (Diabetic or Prediabetic)  06/19/2021    Diabetic microalbuminuria test  07/14/2021    Lipid screen  07/14/2021    Pneumococcal 0-64 years Vaccine  Completed    HIV screen  Completed    Hepatitis A vaccine  Aged Out    Hib vaccine  Aged Out    Meningococcal (ACWY) vaccine  Aged Out             (applicable per patient's age: Cancer Screenings, Depression Screening, Fall Risk Screening, Immunizations)    Hemoglobin A1C (%)   Date Value   06/19/2020 8.7   11/20/2019 8.7 (H)   05/15/2019 7.6 (H)     Microalb/Crt.  Ratio (mcg/mg creat)   Date Value   07/14/2020 CANNOT BE CALCULATED     LDL Cholesterol (mg/dL)   Date Value   07/14/2020 53     AST (U/L)   Date Value   07/14/2020 15     ALT (U/L)   Date Value   07/14/2020 18     BUN (mg/dL)   Date Value   11/20/2019 11      (goal A1C is < 7)   (goal LDL is <100) need 30-50% reduction from baseline     BP Readings from Last 3 Encounters:   07/17/20 118/78   06/19/20 99/61   03/19/20 133/82    (goal /80)      All Future Testing planned in [facility]:  Lab Frequency Next Occurrence       Next Visit Date:  Future Appointments   Date Time Provider Rohini Cabral   8/28/2020 10:20 AM Cain [place]water Might, APRN - CNP [facility]   9/21/2020 11:00 AM Cain Whitewater Might, APRN - CNP [facility]            Patient Active Problem List:     Dyslipidemia     Insomnia     Anxiety and depression     Adopted     Urinary urgency     Female stress incontinence     Controlled type 2 diabetes mellitus without complication, without long-term current use of insulin (HCC)     Nocturia     Frequency of urination     Mixed incontinence     Acute bronchitis with asthma

## 2020-08-28 ENCOUNTER — TELEMEDICINE (OUTPATIENT)
Dept: PRIMARY CARE CLINIC | Age: 35
End: 2020-08-28
Payer: COMMERCIAL

## 2020-08-28 PROCEDURE — G8427 DOCREV CUR MEDS BY ELIG CLIN: HCPCS | Performed by: NURSE PRACTITIONER

## 2020-08-28 PROCEDURE — 99214 OFFICE O/P EST MOD 30 MIN: CPT | Performed by: NURSE PRACTITIONER

## 2020-08-28 RX ORDER — TOPIRAMATE 50 MG/1
50 TABLET, FILM COATED ORAL NIGHTLY
Qty: 90 TABLET | Refills: 0 | Status: SHIPPED | OUTPATIENT
Start: 2020-08-28 | End: 2020-11-19

## 2020-08-28 ASSESSMENT — ENCOUNTER SYMPTOMS
RHINORRHEA: 0
COUGH: 0
PHOTOPHOBIA: 1
CONSTIPATION: 0
SORE THROAT: 0
ABDOMINAL PAIN: 0
EYE PAIN: 1
DIARRHEA: 0
SHORTNESS OF BREATH: 0
NAUSEA: 1
VOMITING: 0
WHEEZING: 0

## 2020-08-28 NOTE — PROGRESS NOTES
2020    TELEHEALTH EVALUATION -- Audio/Visual (During WBKWX-35 public health emergency)    HPI:    Megan Razo (:  1985) has requested an audio/video evaluation for the following concern(s):    Melissa Resendez is here today via video for virtual visit. She is noticing some improvement with starting Topamax and wishes to continue. See below for further comment. Headache    This is a recurrent problem. The current episode started more than 1 month ago. The problem occurs intermittently. The problem has been gradually improving. The pain is located in the bilateral, retro-orbital and frontal region. The pain does not radiate. The pain quality is similar to prior headaches. The quality of the pain is described as squeezing, throbbing and aching. The pain is at a severity of 4/10. The pain is moderate. Associated symptoms include eye pain, nausea and photophobia. Pertinent negatives include no abdominal pain, coughing, dizziness, fever, neck pain, numbness, rhinorrhea, seizures, sore throat, vomiting or weakness. The symptoms are aggravated by emotional stress. She has tried NSAIDs and acetaminophen for the symptoms. The treatment provided mild relief. Her past medical history is significant for migraine headaches and migraines in the family. Review of Systems   Constitutional: Negative for chills, fatigue and fever. HENT: Negative for congestion, rhinorrhea and sore throat. Eyes: Positive for photophobia and pain. Negative for visual disturbance. Respiratory: Negative for cough, shortness of breath and wheezing. Cardiovascular: Negative for chest pain and palpitations. Gastrointestinal: Positive for nausea. Negative for abdominal pain, constipation, diarrhea and vomiting. Genitourinary: Negative for difficulty urinating and dysuria. Musculoskeletal: Negative for gait problem, neck pain and neck stiffness. Skin: Negative for rash. Neurological: Positive for headaches. Negative for dizziness, seizures, syncope, weakness, light-headedness and numbness. Prior to Visit Medications    Medication Sig Taking?  Authorizing Provider   topiramate (TOPAMAX) 50 MG tablet Take 1 tablet by mouth nightly Yes Tyrone Hunger Might, APRN - LINN   insulin glargine (LANTUS) 100 UNIT/ML injection vial Inject 30 Units into the skin nightly  Historical Provider, MD   ondansetron (ZOFRAN) 4 MG tablet Take 1 tablet by mouth every 8 hours as needed for Nausea or Vomiting  Cadillac Hunger Might, APRN - CNP   venlafaxine (EFFEXOR XR) 150 MG extended release capsule Take 1 capsule by mouth once daily  Brett W Might, APRN - CNP   levocetirizine (XYZAL) 5 MG tablet Take 1 tablet by mouth nightly  Cadillac Hunger Might, APRN - CNP   amitriptyline (ELAVIL) 25 MG tablet Take 1 tablet by mouth nightly  Brett W Might, APRN - CNP   Cholecalciferol (VITAMIN D-3) 25 MCG (1000 UT) CAPS Take 2 capsules by mouth once daily  Brett W Might, APRN - CNP   venlafaxine (EFFEXOR XR) 75 MG extended release capsule Take 1 capsule by mouth once daily  Brett W Might, APRN - CNP   atorvastatin (LIPITOR) 20 MG tablet Take 1 tablet by mouth once daily  Brett W Might, APRN - CNP   STEGLATRO 15 MG TABS Take 1 tablet by mouth once daily  Brett W Might, APRN - CNP   busPIRone (BUSPAR) 15 MG tablet Take 15 mg by mouth 2 times daily  Brett W Might, APRN - CNP   montelukast (SINGULAIR) 10 MG tablet TAKE 1 TABLET BY MOUTH ONCE DAILY  Tyrone Hunger Might, APRN - CNP   lisinopril (PRINIVIL;ZESTRIL) 2.5 MG tablet TAKE 1 TABLET BY MOUTH ONCE DAILY  Cadillac Hunger Might, APRN - CNP   Insulin Pen Needle (PEN NEEDLES) 31G X 6 MM MISC 1 each by Does not apply route daily  Brett W Might, APRN - CNP   linagliptin-metformin (JENTADUETO) 2.5-1000 MG TABS TAKE 1 TABLET BY MOUTH TWICE DAILY  Tyrone Hunger Might, APRN - CNP   albuterol sulfate HFA (VENTOLIN HFA) 108 (90 Base) MCG/ACT inhaler Inhale 2 puffs into the lungs every 6 hours as needed for Wheezing  Tyrone Sanderson Might, APRN - CNP   blood glucose test strips (TRUE METRIX BLOOD GLUCOSE TEST) strip 1 each by In Vitro route daily As needed. NINA Costello CNP   glucose monitoring kit (FREESTYLE) monitoring kit 1 kit by Does not apply route daily Substitute as needed for insurance requirements. NINA Costello CNP   Lancets MISC Substitute as needed for insurance requirements. Dx 250.00, testing daily.   NINA Costello CNP       Social History     Tobacco Use    Smoking status: Current Some Day Smoker     Packs/day: 0.25     Types: Cigarettes    Smokeless tobacco: Never Used    Tobacco comment: 1-2 cigs a day   Substance Use Topics    Alcohol use: Yes     Comment: rarely    Drug use: Yes     Types: Marijuana        No Known Allergies,   Past Medical History:   Diagnosis Date    Acute bronchitis with asthma 6/19/2020    Anxiety and depression     Depression     Female stress incontinence 11/23/2015    Obesity     Type II or unspecified type diabetes mellitus without mention of complication, not stated as uncontrolled     Wears glasses    ,   Past Surgical History:   Procedure Laterality Date    TUBAL LIGATION      WISDOM TOOTH EXTRACTION     ,   Social History     Tobacco Use    Smoking status: Current Some Day Smoker     Packs/day: 0.25     Types: Cigarettes    Smokeless tobacco: Never Used    Tobacco comment: 1-2 cigs a day   Substance Use Topics    Alcohol use: Yes     Comment: rarely    Drug use: Yes     Types: Marijuana       PHYSICAL EXAMINATION:  [ INSTRUCTIONS:  \"[x]\" Indicates a positive item  \"[]\" Indicates a negative item  -- DELETE ALL ITEMS NOT EXAMINED]  Vital Signs: (As obtained by patient/caregiver or practitioner observation)    Blood pressure-  Heart rate-    Respiratory rate- 16   Temperature-  Pulse oximetry-     Constitutional: [x] Appears well-developed and well-nourished [x] No apparent distress      [] Abnormal-   Mental status  [x] Alert and awake  [x] Oriented to person/place/time [x]Able to follow commands      Eyes:  EOM    [x]  Normal  [] Abnormal-  Sclera  [x]  Normal  [] Abnormal -         Discharge [x]  None visible  [] Abnormal -    HENT:   [x] Normocephalic, atraumatic. [] Abnormal   [x] Mouth/Throat: Mucous membranes are moist.     External Ears [x] Normal  [] Abnormal-     Neck: [x] No visualized mass     Pulmonary/Chest: [x] Respiratory effort normal.  [x] No visualized signs of difficulty breathing or respiratory distress        [] Abnormal-      Musculoskeletal:   [x] Normal gait with no signs of ataxia         [x] Normal range of motion of neck        [] Abnormal-       Neurological:        [x] No Facial Asymmetry (Cranial nerve 7 motor function) (limited exam to video visit)          [x] No gaze palsy        [] Abnormal-         Skin:        [x] No significant exanthematous lesions or discoloration noted on facial skin         [] Abnormal-            Psychiatric:       [x] Normal Affect [x] No Hallucinations        [] Abnormal-     Other pertinent observable physical exam findings-     ASSESSMENT/PLAN:  1. Chronic migraine without aura without status migrainosus, not intractable    She will increase Topamax to 50 mg nightly. She will call us in 3 weeks with results or sooner if any worsening. She does understand escalation in dose may be necessary and will work together to get to the desired effect. - topiramate (TOPAMAX) 50 MG tablet; Take 1 tablet by mouth nightly  Dispense: 90 tablet; Refill: 0      Return if symptoms worsen or fail to improve. Claudia Howe is a 30 y.o. female being evaluated by a Virtual Visit (video visit) encounter to address concerns as mentioned above. A caregiver was present when appropriate. Due to this being a TeleHealth encounter ([facility]), evaluation of the following organ systems was limited: Vitals/Constitutional/EENT/Resp/CV/GI//MS/Neuro/Skin/Heme-Lymph-Imm.   Pursuant to the emergency declaration under the [facility] waiver authority and the [employer] Act, this Virtual Visit was conducted with patient's (and/or legal guardian's) consent, to reduce the patient's risk of exposure to COVID-19 and provide necessary medical care. The patient (and/or legal guardian) has also been advised to contact this office for worsening conditions or problems, and seek emergency medical treatment and/or call 911 if deemed necessary. Patient identification was verified at the start of the visit: Yes    Total time spent on this encounter: Not billed by time    Services were provided through a video synchronous discussion virtually to substitute for in-person clinic visit. Patient and provider were located at their individual homes. --NINA Fournier CNP on 8/28/2020 at 10:32 AM    An electronic signature was used to authenticate this note.

## 2020-10-29 RX ORDER — LINAGLIPTIN AND METFORMIN HYDROCHLORIDE 2.5; 1 MG/1; MG/1
TABLET, FILM COATED ORAL
Qty: 60 TABLET | Refills: 0 | Status: SHIPPED | OUTPATIENT
Start: 2020-10-29 | End: 2020-11-19

## 2020-10-29 NOTE — TELEPHONE ENCOUNTER
Health Maintenance   Topic Date Due    Varicella vaccine (1 of 2 - 2-dose childhood series) 09/02/1988    DTaP/Tdap/Td vaccine (2 - Tdap) 06/28/2015    Flu vaccine (1) 09/01/2020    Diabetic foot exam  11/04/2020 (Originally 10/9/2019)    Cervical cancer screen  11/04/2020 (Originally 8/19/2018)    Hepatitis B vaccine (1 of 3 - Risk 3-dose series) 06/19/2021 (Originally 9/2/2006)    Diabetic retinal exam  06/29/2021 (Originally 3/29/2019)    Potassium monitoring  11/20/2020    Creatinine monitoring  11/20/2020    A1C test (Diabetic or Prediabetic)  06/19/2021    Diabetic microalbuminuria test  07/14/2021    Lipid screen  07/14/2021    Pneumococcal 0-64 years Vaccine  Completed    HIV screen  Completed    Hepatitis A vaccine  Aged Out    Hib vaccine  Aged Out    Meningococcal (ACWY) vaccine  Aged Out             (applicable per patient's age: Cancer Screenings, Depression Screening, Fall Risk Screening, Immunizations)    Hemoglobin A1C (%)   Date Value   06/19/2020 8.7   11/20/2019 8.7 (H)   05/15/2019 7.6 (H)     Microalb/Crt. Ratio (mcg/mg creat)   Date Value   07/14/2020 CANNOT BE CALCULATED     LDL Cholesterol (mg/dL)   Date Value   07/14/2020 53     AST (U/L)   Date Value   07/14/2020 15     ALT (U/L)   Date Value   07/14/2020 18     BUN (mg/dL)   Date Value   11/20/2019 11      (goal A1C is < 7)   (goal LDL is <100) need 30-50% reduction from baseline     BP Readings from Last 3 Encounters:   07/17/20 118/78   06/19/20 99/61   03/19/20 133/82    (goal /80)      All Future Testing planned in [facility]:  Lab Frequency Next Occurrence       Next Visit Date:  No future appointments.          Patient Active Problem List:     Dyslipidemia     Insomnia     Anxiety and depression     Adopted     Urinary urgency     Female stress incontinence     Controlled type 2 diabetes mellitus without complication, without long-term current use of insulin (HCC)     Nocturia     Frequency of urination     Mixed incontinence     Acute bronchitis with asthma

## 2020-12-01 ENCOUNTER — OFFICE VISIT (OUTPATIENT)
Dept: PRIMARY CARE CLINIC | Age: 35
End: 2020-12-01
Payer: COMMERCIAL

## 2020-12-01 VITALS
DIASTOLIC BLOOD PRESSURE: 62 MMHG | WEIGHT: 224.9 LBS | TEMPERATURE: 97.4 F | OXYGEN SATURATION: 99 % | RESPIRATION RATE: 20 BRPM | HEART RATE: 68 BPM | BODY MASS INDEX: 36.3 KG/M2 | SYSTOLIC BLOOD PRESSURE: 118 MMHG

## 2020-12-01 PROCEDURE — G8484 FLU IMMUNIZE NO ADMIN: HCPCS | Performed by: NURSE PRACTITIONER

## 2020-12-01 PROCEDURE — 99214 OFFICE O/P EST MOD 30 MIN: CPT | Performed by: NURSE PRACTITIONER

## 2020-12-01 PROCEDURE — 90688 IIV4 VACCINE SPLT 0.5 ML IM: CPT | Performed by: NURSE PRACTITIONER

## 2020-12-01 PROCEDURE — 83036 HEMOGLOBIN GLYCOSYLATED A1C: CPT | Performed by: NURSE PRACTITIONER

## 2020-12-01 PROCEDURE — 2022F DILAT RTA XM EVC RTNOPTHY: CPT | Performed by: NURSE PRACTITIONER

## 2020-12-01 PROCEDURE — 3052F HG A1C>EQUAL 8.0%<EQUAL 9.0%: CPT | Performed by: NURSE PRACTITIONER

## 2020-12-01 PROCEDURE — 90471 IMMUNIZATION ADMIN: CPT | Performed by: NURSE PRACTITIONER

## 2020-12-01 PROCEDURE — 4004F PT TOBACCO SCREEN RCVD TLK: CPT | Performed by: NURSE PRACTITIONER

## 2020-12-01 PROCEDURE — G8427 DOCREV CUR MEDS BY ELIG CLIN: HCPCS | Performed by: NURSE PRACTITIONER

## 2020-12-01 PROCEDURE — G8417 CALC BMI ABV UP PARAM F/U: HCPCS | Performed by: NURSE PRACTITIONER

## 2020-12-01 RX ORDER — PEN NEEDLE, DIABETIC 31 G X1/4"
1 NEEDLE, DISPOSABLE MISCELLANEOUS DAILY
Qty: 100 EACH | Refills: 3 | Status: SHIPPED | OUTPATIENT
Start: 2020-12-01 | End: 2021-05-07 | Stop reason: SDUPTHER

## 2020-12-01 RX ORDER — BACLOFEN 10 MG/1
10 TABLET ORAL 3 TIMES DAILY
Qty: 90 TABLET | Refills: 1 | Status: SHIPPED | OUTPATIENT
Start: 2020-12-01 | End: 2022-01-26 | Stop reason: ALTCHOICE

## 2020-12-01 RX ORDER — ERTUGLIFLOZIN 15 MG/1
TABLET, FILM COATED ORAL
Qty: 90 TABLET | Refills: 3 | Status: SHIPPED | OUTPATIENT
Start: 2020-12-01 | End: 2021-12-27

## 2020-12-01 RX ORDER — INSULIN GLARGINE 100 [IU]/ML
34 INJECTION, SOLUTION SUBCUTANEOUS NIGHTLY
Qty: 5 PEN | Refills: 5 | Status: SHIPPED | OUTPATIENT
Start: 2020-12-01 | End: 2021-05-07 | Stop reason: SDUPTHER

## 2020-12-01 ASSESSMENT — ENCOUNTER SYMPTOMS
RHINORRHEA: 0
NAUSEA: 0
COUGH: 0
SORE THROAT: 0
CONSTIPATION: 0
BACK PAIN: 1
WHEEZING: 0
DIARRHEA: 0
SHORTNESS OF BREATH: 0
VOMITING: 0
ABDOMINAL PAIN: 0

## 2020-12-01 NOTE — PROGRESS NOTES
Name: Go Huerta  : 1985         Chief Complaint:     Chief Complaint   Patient presents with    Diabetes     routine check    Hyperlipidemia       History of Present Illness:      Go Huerta is a 37 y.o.  female who presents with Diabetes (routine check) and Hyperlipidemia      Ami Duong is here today for a routine office visit. Diabetes   She presents for her follow-up diabetic visit. She has type 2 diabetes mellitus. Her disease course has been improving. There are no hypoglycemic associated symptoms. Pertinent negatives for hypoglycemia include no confusion, dizziness, headaches, nervousness/anxiousness, pallor, sleepiness or speech difficulty. Associated symptoms include fatigue. Pertinent negatives for diabetes include no chest pain, no foot paresthesias, no polydipsia, no polyphagia, no polyuria and no weakness. There are no hypoglycemic complications. Pertinent negatives for hypoglycemia complications include no blackouts, no required assistance and no required glucagon injection. Symptoms are stable. There are no diabetic complications. Pertinent negatives for diabetic complications include no CVA, heart disease, nephropathy or peripheral neuropathy. Risk factors for coronary artery disease include diabetes mellitus, dyslipidemia, family history, obesity, sedentary lifestyle and stress. Current diabetic treatment includes insulin injections and oral agent (triple therapy). She is compliant with treatment all of the time. Her weight is stable. She is following a generally unhealthy diet. When asked about meal planning, she reported none. She has had a previous visit with a dietitian. She never participates in exercise. Her home blood glucose trend is decreasing steadily. Her breakfast blood glucose range is generally 180-200 mg/dl. An ACE inhibitor/angiotensin II receptor blocker is being taken. She does not see a podiatrist.Eye exam is current.    Hyperlipidemia   This is a chronic problem. The current episode started more than 1 year ago. The problem is controlled. Recent lipid tests were reviewed and are normal. Exacerbating diseases include diabetes and obesity. She has no history of hypothyroidism or liver disease. Factors aggravating her hyperlipidemia include fatty foods and smoking. Pertinent negatives include no chest pain, focal sensory loss, focal weakness or shortness of breath. Current antihyperlipidemic treatment includes statins. The current treatment provides moderate improvement of lipids. Compliance problems include adherence to exercise and adherence to diet. Risk factors for coronary artery disease include diabetes mellitus, dyslipidemia, obesity and stress. Past Medical History:     Past Medical History:   Diagnosis Date    Acute bronchitis with asthma 6/19/2020    Anxiety and depression     Depression     Female stress incontinence 11/23/2015    Obesity     Type II or unspecified type diabetes mellitus without mention of complication, not stated as uncontrolled     Wears glasses       Reviewed all health maintenance requirements and ordered appropriate tests  Health Maintenance Due   Topic Date Due    Diabetic foot exam  10/09/2019    Flu vaccine (1) 09/01/2020    Potassium monitoring  11/20/2020    Creatinine monitoring  11/20/2020       Past Surgical History:     Past Surgical History:   Procedure Laterality Date    TUBAL LIGATION      WISDOM TOOTH EXTRACTION          Medications:       Prior to Admission medications    Medication Sig Start Date End Date Taking?  Authorizing Provider   Ertugliflozin L-PyroglutamicAc (STEGLATRO) 15 MG TABS Take 1 tablet by mouth once daily 12/1/20  Yes NINA Bentley CNP   baclofen (LIORESAL) 10 MG tablet Take 1 tablet by mouth 3 times daily 12/1/20  Yes NINA Bentley CNP   insulin glargine (LANTUS SOLOSTAR) 100 UNIT/ML injection pen Inject 34 Units into the skin nightly 12/1/20  Yes NINA Bentley route daily As needed. Patient not taking: Reported on 12/1/2020 9/6/18   Helen Hammans Might, APRN - CNP   glucose monitoring kit (FREESTYLE) monitoring kit 1 kit by Does not apply route daily Substitute as needed for insurance requirements. 8/10/18   Helen Hammans Might, APRN - CNP        Allergies:       Patient has no known allergies. Social History:     Tobacco:    reports that she has been smoking cigarettes. She has been smoking about 0.25 packs per day. She has never used smokeless tobacco.  Alcohol:      reports current alcohol use. Drug Use:  reports current drug use. Drug: Marijuana. Family History:     Family History   Adopted: Yes       Review of Systems:     Positive and Negative as described in HPI    Review of Systems   Constitutional: Positive for fatigue. Negative for chills and fever. HENT: Negative for congestion, rhinorrhea and sore throat. Eyes: Negative for visual disturbance. Respiratory: Negative for cough, shortness of breath and wheezing. Cardiovascular: Negative for chest pain and palpitations. Gastrointestinal: Negative for abdominal pain, constipation, diarrhea, nausea and vomiting. Endocrine: Negative for polydipsia, polyphagia and polyuria. Genitourinary: Negative for difficulty urinating and dysuria. Musculoskeletal: Positive for back pain. Negative for gait problem, neck pain and neck stiffness. Skin: Negative for pallor and rash. Neurological: Negative for dizziness, focal weakness, syncope, speech difficulty, weakness, light-headedness and headaches. Psychiatric/Behavioral: Negative for confusion. The patient is not nervous/anxious. Physical Exam:   Vitals:  /62   Pulse 68   Temp 97.4 °F (36.3 °C) (Temporal)   Resp 20   Wt 224 lb 14.4 oz (102 kg)   SpO2 99%   BMI 36.30 kg/m²     Physical Exam  Vitals signs and nursing note reviewed. Constitutional:       General: She is not in acute distress. Appearance: Normal appearance.  She is without long-term current use of insulin ([facility])   DIABETES FOOT EXAM    POCT glycosylated hemoglobin (Hb A1C)    CBC Auto Differential    ALT    AST    Basic Metabolic Panel    Hemoglobin A1C    Lipid Panel    Microalbumin, Ur    Ertugliflozin L-PyroglutamicAc (STEGLATRO) 15 MG TABS    insulin glargine (LANTUS SOLOSTAR) 100 UNIT/ML injection pen    Insulin Pen Needle (PEN NEEDLES) 31G X 6 MM MISC   2. Dyslipidemia     3. Other screening mammogram  [facility] CINDY DIGITAL SCREEN BILATERAL       1. Shaggy Gannon received counseling on the following healthy behaviors: nutrition, exercise, medication adherence and tobacco cessation  2. Patient given educational materials - see patient instructions  3. Was a self-tracking handout given in paper form or via IVFXPERTt? No  If yes, see orders or list here. 4.  Discussed use, benefit, and side effects of prescribed medications. Barriers to medication compliance addressed. All patient questions answered. Pt voiced understanding. 5.  Reviewed prior labs and health maintenance  6. Continue current medications, diet and exercise. Completed Refills   Requested Prescriptions     Signed Prescriptions Disp Refills    Ertugliflozin L-PyroglutamicAc (STEGLATRO) 15 MG TABS 90 tablet 3     Sig: Take 1 tablet by mouth once daily    baclofen (LIORESAL) 10 MG tablet 90 tablet 1     Sig: Take 1 tablet by mouth 3 times daily    insulin glargine (LANTUS SOLOSTAR) 100 UNIT/ML injection pen 5 pen 5     Sig: Inject 34 Units into the skin nightly    Insulin Pen Needle (PEN NEEDLES) 31G X 6 MM MISC 100 each 3     Si each by Does not apply route daily         Return in about 6 months (around 2021) for Check up.

## 2020-12-01 NOTE — PROGRESS NOTES
After obtaining consent, and per orders of JUDI R[facility] Might injection of Flu given in Right deltoid by Salinas Sosa. Patient instructed to remain in clinic for 20 minutes afterwards, and to report any adverse reaction to me immediately. Vaccine Information Sheet, \"Influenza - Inactivated\"  given to Mono Glez, or parent/legal guardian of  Mono Glez and verbalized understanding. Patient responses:    Have you ever had a reaction to a flu vaccine? No  Are you able to eat eggs without adverse effects? Yes  Do you have any current illness? No  Have you ever had Guillian Luling Syndrome? No    Flu vaccine given per order. Please see immunization tab.

## 2020-12-01 NOTE — PATIENT INSTRUCTIONS
SURVEY:    You may be receiving a survey from [employer] regarding your visit today. Please complete the survey to enable us to provide the highest quality of care to you and your family. If you cannot score us a very good on any question, please call the office to discuss how we could have made your experience a very good one. Thank you. Patient Education        Counting Carbohydrates: Care Instructions  Your Care Instructions     You don't have to eat special foods when you have diabetes. You just have to be careful to eat healthy foods. Carbohydrates (carbs) raise blood sugar higher and quicker than any other nutrient. Carbs are found in desserts, breads and cereals, and fruit. They're also in starchy vegetables. These include potatoes, corn, and grains such as rice and pasta. Carbs are also in milk and yogurt. The more carbs you eat at one time, the higher your blood sugar will rise. Spreading carbs all through the day helps keep your blood sugar levels within your target range. Counting carbs is one of the best ways to keep your blood sugar under control. If you use insulin, counting carbs helps you match the right amount of insulin to the number of grams of carbs in a meal. Then you can change your diet and insulin dose as needed. Testing your blood sugar several times a day can help you learn how carbs affect your blood sugar. A registered dietitian or certified diabetes educator can help you plan meals and snacks. Follow-up care is a key part of your treatment and safety. Be sure to make and go to all appointments, and call your doctor if you are having problems. It's also a good idea to know your test results and keep a list of the medicines you take. How can you care for yourself at home?   Know your daily amount of carbohydrates  Your daily amount depends on several things, such as your weight, how active you are, which diabetes medicines you take, and what your goals are for your more slowly than it would otherwise. Eat from all food groups  · Eat at least three meals a day. · Plan meals to include food from all the food groups. The food groups include grains, fruits, dairy, proteins, and vegetables. · Talk to your dietitian or diabetes educator about ways to add limited amounts of sweets into your meal plan. · If you drink alcohol, talk to your doctor. It may not be recommended when you are taking certain diabetes medicines. Where can you learn more? Go to https://Pressable.[employer]. org and sign in to your [employer] account. Enter J304 in the [employer] box to learn more about \"Counting Carbohydrates: Care Instructions. \"     If you do not have an account, please click on the \"Sign Up Now\" link. Current as of: December 20, 2019               Content Version: 12.6  © 4869-2174 [employer]. Care instructions adapted under license by [facility] ([facility]). If you have questions about a medical condition or this instruction, always ask your healthcare professional. [address] any warranty or liability for your use of this information.

## 2020-12-02 LAB — HBA1C MFR BLD: 8.7 %

## 2020-12-04 ENCOUNTER — HOSPITAL ENCOUNTER (OUTPATIENT)
Dept: WOMENS IMAGING | Age: 35
Discharge: HOME | End: 2020-12-06
Payer: COMMERCIAL

## 2020-12-04 PROCEDURE — 77063 BREAST TOMOSYNTHESIS BI: CPT

## 2020-12-07 ENCOUNTER — TELEPHONE (OUTPATIENT)
Dept: PRIMARY CARE CLINIC | Age: 35
End: 2020-12-07

## 2020-12-07 NOTE — TELEPHONE ENCOUNTER
----- Message from [address] [place] [address], APRN - CNP sent at 12/4/2020  3:56 PM EST -----  Right breast ultrasound ordered. Thank you.

## 2020-12-14 ENCOUNTER — TELEPHONE (OUTPATIENT)
Dept: PRIMARY CARE CLINIC | Age: 35
End: 2020-12-14

## 2020-12-14 ENCOUNTER — HOSPITAL ENCOUNTER (OUTPATIENT)
Dept: ULTRASOUND IMAGING | Age: 35
Discharge: HOME | End: 2020-12-16
Payer: COMMERCIAL

## 2020-12-14 PROCEDURE — 76641 ULTRASOUND BREAST COMPLETE: CPT

## 2020-12-14 NOTE — TELEPHONE ENCOUNTER
----- Message from [address] [place] [address], APRN - CNP sent at 12/14/2020  3:48 PM EST -----  Breast biopsy ordered. Thank you.

## 2020-12-14 NOTE — TELEPHONE ENCOUNTER
Called patient and informed her that Nini Escoto has ordered a breast biopsy as per request of radiologist per US. Order faxed to centralized scheduling.

## 2020-12-23 ENCOUNTER — HOSPITAL ENCOUNTER (OUTPATIENT)
Dept: ULTRASOUND IMAGING | Age: 35
Discharge: HOME | End: 2020-12-25
Payer: COMMERCIAL

## 2020-12-23 ENCOUNTER — HOSPITAL ENCOUNTER (OUTPATIENT)
Dept: WOMENS IMAGING | Age: 35
Discharge: HOME | End: 2020-12-25
Payer: COMMERCIAL

## 2020-12-23 ENCOUNTER — TELEPHONE (OUTPATIENT)
Dept: PRIMARY CARE CLINIC | Age: 35
End: 2020-12-23

## 2020-12-23 VITALS
DIASTOLIC BLOOD PRESSURE: 75 MMHG | RESPIRATION RATE: 12 BRPM | OXYGEN SATURATION: 97 % | SYSTOLIC BLOOD PRESSURE: 118 MMHG | HEART RATE: 77 BPM

## 2020-12-23 PROCEDURE — 76641 ULTRASOUND BREAST COMPLETE: CPT

## 2020-12-23 NOTE — TELEPHONE ENCOUNTER
----- Message from [address] [place] [address], APRN - CNP sent at 12/23/2020  3:41 PM EST -----  Radiologist recommends repeat mammogram in 6 months. I have ordered this test.  If patient would like I will refer her to general surgery for second opinion.   Thank you

## 2020-12-23 NOTE — SEDATION DOCUMENTATION
Right breast scanned per Dr. Melanie García. Dr. Melanie García explained findings with pt. Pt and Dr. Melanie García discussing findings. Pt in agreement with Dr. Melanie García to watch area and repeat mammogram in 6 months.

## 2021-01-26 DIAGNOSIS — E11.9 TYPE 2 DIABETES MELLITUS WITHOUT COMPLICATION (HCC): ICD-10-CM

## 2021-01-27 RX ORDER — LINAGLIPTIN AND METFORMIN HYDROCHLORIDE 2.5; 1 MG/1; MG/1
1 TABLET, FILM COATED ORAL 2 TIMES DAILY
Qty: 180 TABLET | Refills: 1 | Status: SHIPPED | OUTPATIENT
Start: 2021-01-27 | End: 2021-05-13 | Stop reason: ALTCHOICE

## 2021-01-27 RX ORDER — LINAGLIPTIN AND METFORMIN HYDROCHLORIDE 2.5; 1 MG/1; MG/1
TABLET, FILM COATED ORAL
Qty: 60 TABLET | Refills: 0 | Status: SHIPPED | OUTPATIENT
Start: 2021-01-27 | End: 2021-01-27 | Stop reason: SDUPTHER

## 2021-03-25 ENCOUNTER — TELEPHONE (OUTPATIENT)
Dept: PRIMARY CARE CLINIC | Age: 36
End: 2021-03-25

## 2021-05-06 ENCOUNTER — PATIENT MESSAGE (OUTPATIENT)
Dept: PRIMARY CARE CLINIC | Age: 36
End: 2021-05-06

## 2021-05-07 ENCOUNTER — OFFICE VISIT (OUTPATIENT)
Dept: PRIMARY CARE CLINIC | Age: 36
End: 2021-05-07
Payer: COMMERCIAL

## 2021-05-07 VITALS
HEART RATE: 89 BPM | BODY MASS INDEX: 36.96 KG/M2 | DIASTOLIC BLOOD PRESSURE: 80 MMHG | HEIGHT: 66 IN | WEIGHT: 230 LBS | SYSTOLIC BLOOD PRESSURE: 132 MMHG | OXYGEN SATURATION: 98 % | RESPIRATION RATE: 18 BRPM

## 2021-05-07 DIAGNOSIS — Z79.4 TYPE 2 DIABETES MELLITUS WITH OTHER SPECIFIED COMPLICATION, WITH LONG-TERM CURRENT USE OF INSULIN (HCC): Primary | ICD-10-CM

## 2021-05-07 DIAGNOSIS — E11.69 TYPE 2 DIABETES MELLITUS WITH OTHER SPECIFIED COMPLICATION, WITH LONG-TERM CURRENT USE OF INSULIN (HCC): Primary | ICD-10-CM

## 2021-05-07 DIAGNOSIS — F32.A ANXIETY AND DEPRESSION: ICD-10-CM

## 2021-05-07 DIAGNOSIS — G62.9 POLYNEUROPATHY: ICD-10-CM

## 2021-05-07 DIAGNOSIS — F41.9 ANXIETY AND DEPRESSION: ICD-10-CM

## 2021-05-07 LAB — HBA1C MFR BLD: 8.4 %

## 2021-05-07 PROCEDURE — 3052F HG A1C>EQUAL 8.0%<EQUAL 9.0%: CPT | Performed by: NURSE PRACTITIONER

## 2021-05-07 PROCEDURE — 4004F PT TOBACCO SCREEN RCVD TLK: CPT | Performed by: NURSE PRACTITIONER

## 2021-05-07 PROCEDURE — 99214 OFFICE O/P EST MOD 30 MIN: CPT | Performed by: NURSE PRACTITIONER

## 2021-05-07 PROCEDURE — G8417 CALC BMI ABV UP PARAM F/U: HCPCS | Performed by: NURSE PRACTITIONER

## 2021-05-07 PROCEDURE — 83036 HEMOGLOBIN GLYCOSYLATED A1C: CPT | Performed by: NURSE PRACTITIONER

## 2021-05-07 PROCEDURE — G8427 DOCREV CUR MEDS BY ELIG CLIN: HCPCS | Performed by: NURSE PRACTITIONER

## 2021-05-07 PROCEDURE — 2022F DILAT RTA XM EVC RTNOPTHY: CPT | Performed by: NURSE PRACTITIONER

## 2021-05-07 RX ORDER — BUSPIRONE HYDROCHLORIDE 15 MG/1
15 TABLET ORAL 3 TIMES DAILY
Qty: 90 TABLET | Refills: 0 | Status: SHIPPED | OUTPATIENT
Start: 2021-05-07 | End: 2021-10-19

## 2021-05-07 RX ORDER — LANCETS 30 GAUGE
EACH MISCELLANEOUS
Qty: 100 EACH | Refills: 3 | Status: SHIPPED | OUTPATIENT
Start: 2021-05-07 | End: 2025-01-03

## 2021-05-07 RX ORDER — CALCIUM CITRATE/VITAMIN D3 200MG-6.25
1 TABLET ORAL DAILY
Qty: 100 EACH | Refills: 3 | Status: SHIPPED | OUTPATIENT
Start: 2021-05-07

## 2021-05-07 RX ORDER — INSULIN GLARGINE 100 [IU]/ML
44 INJECTION, SOLUTION SUBCUTANEOUS NIGHTLY
Qty: 5 PEN | Refills: 5 | Status: SHIPPED | OUTPATIENT
Start: 2021-05-07 | End: 2021-10-19

## 2021-05-07 RX ORDER — PEN NEEDLE, DIABETIC 31 G X1/4"
1 NEEDLE, DISPOSABLE MISCELLANEOUS DAILY
Qty: 100 EACH | Refills: 3 | Status: SHIPPED | OUTPATIENT
Start: 2021-05-07

## 2021-05-07 RX ORDER — ORAL SEMAGLUTIDE 7 MG/1
7 TABLET ORAL DAILY
Qty: 30 TABLET | Refills: 0 | Status: SHIPPED | OUTPATIENT
Start: 2021-06-04 | End: 2021-06-29

## 2021-05-07 RX ORDER — ORAL SEMAGLUTIDE 3 MG/1
3 TABLET ORAL DAILY
Qty: 30 TABLET | Refills: 0 | Status: SHIPPED | OUTPATIENT
Start: 2021-05-07 | End: 2021-05-13

## 2021-05-07 RX ORDER — BLOOD-GLUCOSE METER
1 KIT MISCELLANEOUS DAILY
Qty: 1 KIT | Refills: 0 | Status: SHIPPED | OUTPATIENT
Start: 2021-05-07

## 2021-05-07 RX ORDER — GABAPENTIN 100 MG/1
100 CAPSULE ORAL 3 TIMES DAILY
Qty: 90 CAPSULE | Refills: 0 | Status: SHIPPED | OUTPATIENT
Start: 2021-05-07 | End: 2021-06-03 | Stop reason: SDUPTHER

## 2021-05-07 NOTE — TELEPHONE ENCOUNTER
From: Rand Mean  To: NINA Romero CNP  Sent: 5/6/2021 9:57 PM EDT  Subject: Non-Urgent Medical Question    My anxiety is high, I feel like im coming down with something. Lorene had right arm weakness for three days now and I have been cold. It's not normal for me I'm usually warmer. Emotional, worn out, tired more than usual, panic attacks, headaches. My feet and hands have been going numb more and getting pins and needles and cramps in my legs and feet again too.

## 2021-05-07 NOTE — PROGRESS NOTES
Name: René Soares  : 1985         Chief Complaint:     Chief Complaint   Patient presents with   [address] Problem     States \"getting worse due to work\"    Arm Pain     x 4 days. c/o RT arm pain. History of Present Illness:      René Soares is a 37 y.o.  female who presents with Mental Health Problem (States \"getting worse due to work\") and Arm Pain (x 4 days. c/o RT arm pain. )      Jayde is here today for a same-day visit. She reports as of recently she has been feeling more anxious and irritated. She reports she is feeling more stressed out than usual.  She has been working long hours as a  for [employer]. She does have a previous history of anxiety and depression which was pretty well controlled in the past.  She is currently on Effexor 225 mg daily and BuSpar 15 mg twice daily. Along with this she reports over the last 4 days she has had some pain in her right shoulder. Pain will move from her right shoulder to her hand. She has also had numbness and tingling in her hands and feet bilaterally. She denies any neck pain or wrist pain. Denies any new back pain. She does have a history of type 2 diabetes. Her last A1c was greater than 8. Reports that she has been compliant with her diabetic medications. She reports that she has had this in the past but it has become more frequently as of recently. Denies history of thyroid problems or alcohol abuse.         Past Medical History:     Past Medical History:   Diagnosis Date    Acute bronchitis with asthma 2020    Anxiety and depression     Depression     Female stress incontinence 2015    Obesity     Type II or unspecified type diabetes mellitus without mention of complication, not stated as uncontrolled     Wears glasses       Reviewed all health maintenance requirements and ordered appropriate tests  Health Maintenance Due   Topic Date Due    Potassium monitoring  2020    (LIORESAL) 10 MG tablet Take 1 tablet by mouth 3 times daily 12/1/20  Yes NINA Black CNP   montelukast (SINGULAIR) 10 MG tablet Take 1 tablet by mouth once daily 11/19/20  Yes NINA Black CNP   ondansetron (ZOFRAN) 4 MG tablet Take 1 tablet by mouth every 8 hours as needed for Nausea or Vomiting 7/21/20  Yes NINA Black CNP   venlafaxine (EFFEXOR XR) 150 MG extended release capsule Take 1 capsule by mouth once daily 7/13/20  Yes NINA Black CNP   levocetirizine (XYZAL) 5 MG tablet Take 1 tablet by mouth nightly 7/13/20  Yes NINA Black CNP   amitriptyline (ELAVIL) 25 MG tablet Take 1 tablet by mouth nightly 7/13/20  NINA Gold CNP   Cholecalciferol (VITAMIN D-3) 25 MCG (1000 UT) CAPS Take 2 capsules by mouth once daily 7/13/20  Yes NINA Black CNP   venlafaxine (EFFEXOR XR) 75 MG extended release capsule Take 1 capsule by mouth once daily 7/13/20  Yes NINA Black CNP   atorvastatin (LIPITOR) 20 MG tablet Take 1 tablet by mouth once daily 7/13/20  Yes NINA Black CNP   albuterol sulfate HFA (VENTOLIN HFA) 108 (90 Base) MCG/ACT inhaler Inhale 2 puffs into the lungs every 6 hours as needed for Wheezing 5/14/19  Yes NINA Black CNP        Allergies:       Patient has no known allergies. Social History:     Tobacco:    reports that she has been smoking cigarettes. She has been smoking about 0.25 packs per day. She has never used smokeless tobacco.  Alcohol:      reports current alcohol use. Drug Use:  reports current drug use. Drug: Marijuana. Family History:     Family History   Adopted: Yes       Review of Systems:     Positive and Negative as described in HPI    Review of Systems   Constitutional: Negative for activity change, appetite change and fever. Respiratory: Negative for cough, shortness of breath and wheezing. Gastrointestinal: Negative for diarrhea, nausea and vomiting.    Endocrine: Negative for polydipsia, polyphagia and polyuria. Genitourinary: Negative for difficulty urinating and dysuria. Musculoskeletal: Positive for arthralgias. Negative for back pain, gait problem and joint swelling. Skin: Negative for rash and wound. Neurological: Positive for numbness and headaches. Negative for dizziness. Psychiatric/Behavioral: Positive for agitation, decreased concentration and dysphoric mood. Negative for self-injury and suicidal ideas. The patient is nervous/anxious. Physical Exam:   Vitals:  /80 (Site: Left Upper Arm, Position: Sitting, Cuff Size: Large Adult)   Pulse 89   Resp 18   Ht 5' 6\" (1.676 m)   Wt 230 lb (104.3 kg)   SpO2 98%   BMI 37.12 kg/m²     Physical Exam  Vitals signs and nursing note reviewed. Constitutional:       General: She is not in acute distress. Appearance: Normal appearance. She is obese. She is not toxic-appearing or diaphoretic. HENT:      Head: Normocephalic and atraumatic. Neck:      Musculoskeletal: Normal range of motion and neck supple. Cardiovascular:      Rate and Rhythm: Normal rate and regular rhythm. Heart sounds: No murmur. Pulmonary:      Effort: Pulmonary effort is normal.      Breath sounds: Normal breath sounds. No wheezing, rhonchi or rales. Musculoskeletal:      Right shoulder: She exhibits normal range of motion, no tenderness, no swelling and no effusion. Cervical back: She exhibits normal range of motion, no tenderness, no bony tenderness and no swelling. Lumbar back: She exhibits normal range of motion and no tenderness. Right lower leg: No edema. Left lower leg: No edema. Comments: Negative empty can test right side. Negative arc sign. Negative Spurling's bilaterally   Lymphadenopathy:      Cervical: No cervical adenopathy. Neurological:      General: No focal deficit present. Mental Status: She is alert and oriented to person, place, and time.    Psychiatric: Attention and Perception: Attention normal.         Mood and Affect: Mood is anxious (Mildly). Mood is not depressed. Affect is not blunt, angry or tearful. Speech: Speech normal.         Thought Content: Thought content does not include homicidal or suicidal ideation. Thought content does not include homicidal or suicidal plan. Data:     Lab Results   Component Value Date     11/20/2019    K 4.4 11/20/2019     11/20/2019    CO2 23 11/20/2019    BUN 11 11/20/2019    CREATININE 0.44 11/20/2019    GLUCOSE 185 11/20/2019    PROT 7.3 11/20/2019    LABALBU 3.9 11/20/2019    BILITOT <0.10 11/20/2019    ALKPHOS 86 11/20/2019    AST 15 07/14/2020    ALT 18 07/14/2020     Lab Results   Component Value Date    WBC 9.0 07/14/2020    RBC 4.49 07/14/2020    HGB 13.3 07/14/2020    HCT 42.5 07/14/2020    MCV 94.7 07/14/2020    MCH 29.6 07/14/2020    MCHC 31.3 07/14/2020    RDW 12.4 07/14/2020     07/14/2020    MPV 8.7 07/14/2020     Lab Results   Component Value Date    TSH 2.99 11/25/2014     Lab Results   Component Value Date    CHOL 131 07/14/2020    HDL 32 07/14/2020    LABA1C 8.4 05/07/2021       Assessment/Plan:      Diagnosis Orders   1. Type 2 diabetes mellitus with other specified complication, with long-term current use of insulin (formerly [facility])  POCT glycosylated hemoglobin (Hb A1C)    Semaglutide (RYBELSUS) 3 MG TABS    Semaglutide (RYBELSUS) 7 MG TABS    insulin glargine (LANTUS SOLOSTAR) 100 UNIT/ML injection pen    glucose monitoring kit (FREESTYLE) monitoring kit    Insulin Pen Needle (PEN NEEDLES) 31G X 6 MM MISC    blood glucose test strips (TRUE METRIX BLOOD GLUCOSE TEST) strip    Lancets MISC   2. Polyneuropathy  gabapentin (NEURONTIN) 100 MG capsule    EMG   3. Anxiety and depression  busPIRone (BUSPAR) 15 MG tablet       Type 2 diabetesA1c 8.4 in the office. This is slightly improved from 8.7. She is currently on 40 units of Lantus. Will increase Lantus to 44 units nightly. Continue Steglatro 15 mg daily. In addition I believe she would benefit from a GLP-1 agonist.  We will start Rybelsus 3 mg x 1 month and then increase to 7 mg daily. She was educated on taking this first thing in the morning on empty stomach with a full glass of water. She is to contact us if she has any difficulties with obtaining the medication. If she is able to get started on this medication we will be able to stop Jentadueto. Aong with this I have asked her to start checking her blood sugars every morning and contact us weekly with readings. She lost her glucometer a new prescription has been sent to the pharmacy. Polyneuropathylikely diabetic neuropathy. Will start gabapentin 100 mg 3 times daily. Also obtain EMG of upper and lower extremities. Anxiety and depressionwe will increase BuSpar from 15 mg twice daily to 15 mg 3 times daily. She will continue Effexor 225 mg daily. She has follow-up in 1 month. If no improvement she is to contact the office. 1.  Obdulia Echeverria received counseling on the following healthy behaviors: nutrition, exercise and medication adherence  2. Patient given educational materials - see patient instructions  3. Was a self-tracking handout given in paper form or via [employer]? No  If yes, see orders or list here. 4.  Discussed use, benefit, and side effects of prescribed medications. Barriers to medication compliance addressed. All patient questions answered. Pt voiced understanding. 5.  Reviewed prior labs and health maintenance  6. Continue current medications, diet and exercise.     Completed Refills   Requested Prescriptions     Signed Prescriptions Disp Refills    busPIRone (BUSPAR) 15 MG tablet 90 tablet 0     Sig: Take 15 mg by mouth 3 times daily    Semaglutide (RYBELSUS) 3 MG TABS 30 tablet 0     Sig: Take 3 mg by mouth daily    Semaglutide (RYBELSUS) 7 MG TABS 30 tablet 0     Sig: Take 7 mg by mouth daily    gabapentin (NEURONTIN) 100 MG capsule 90 capsule 0     Sig: Take 1 capsule by mouth 3 times daily for 30 days.  insulin glargine (LANTUS SOLOSTAR) 100 UNIT/ML injection pen 5 pen 5     Sig: Inject 44 Units into the skin nightly    glucose monitoring kit (FREESTYLE) monitoring kit 1 kit 0     Si kit by Does not apply route daily Substitute as needed for insurance requirements.  Insulin Pen Needle (PEN NEEDLES) 31G X 6 MM MISC 100 each 3     Si each by Does not apply route daily    blood glucose test strips (TRUE METRIX BLOOD GLUCOSE TEST) strip 100 each 3     Si each by In Vitro route daily As needed.  Lancets MISC 100 each 3     Sig: Substitute as needed for insurance requirements. Dx 250.00, testing daily. No follow-ups on file.

## 2021-05-07 NOTE — PATIENT INSTRUCTIONS
SURVEY:    You may be receiving a survey from [employer] regarding your visit today. Please complete the survey to enable us to provide the highest quality of care to you and your family. If you cannot score us a very good on any question, please call the office to discuss how we could have made your experience a very good one. Thank you.   Meliza Johnson, APRN-CNP  Campos Minor, APRN-CNP  Neptali Katz, PEPE Ward, JOSE Wright, PCA

## 2021-05-07 NOTE — TELEPHONE ENCOUNTER
Can you contact patient and let her know Jayden Ewing is out of the office. She can schedule appointment today to see me otherwise I would recommend she go to the emergency department.  Thank you

## 2021-05-11 ASSESSMENT — ENCOUNTER SYMPTOMS
DIARRHEA: 0
WHEEZING: 0
VOMITING: 0
BACK PAIN: 0
NAUSEA: 0
SHORTNESS OF BREATH: 0
COUGH: 0

## 2021-05-13 RX ORDER — ORAL SEMAGLUTIDE 3 MG/1
3 TABLET ORAL DAILY
Qty: 30 TABLET | Refills: 0 | Status: SHIPPED | OUTPATIENT
Start: 2021-05-13 | End: 2021-06-29

## 2021-05-13 NOTE — PROGRESS NOTES
7 mg tablets have been approved however we are still waiting for 3 mg tablets. Patient was contacted and she is going to come in for 1 month supply of 3 mg tablets. Since Rybelsus was approved will discontinue Jentadueto. And start regular Metformin 1000 mg twice daily.

## 2021-05-25 ENCOUNTER — TELEPHONE (OUTPATIENT)
Dept: PRIMARY CARE CLINIC | Age: 36
End: 2021-05-25

## 2021-05-28 ENCOUNTER — HOSPITAL ENCOUNTER (OUTPATIENT)
Age: 36
Discharge: HOME | End: 2021-05-28
Payer: COMMERCIAL

## 2021-05-28 DIAGNOSIS — E11.9 CONTROLLED TYPE 2 DIABETES MELLITUS WITHOUT COMPLICATION, WITHOUT LONG-TERM CURRENT USE OF INSULIN (HCC): ICD-10-CM

## 2021-05-28 LAB
ABSOLUTE EOS #: 0.39 K/UL (ref 0–0.44)
ABSOLUTE IMMATURE GRANULOCYTE: 0.05 K/UL (ref 0–0.3)
ABSOLUTE LYMPH #: 2.46 K/UL (ref 1.1–3.7)
ABSOLUTE MONO #: 0.51 K/UL (ref 0.1–1.2)
ALT SERPL-CCNC: 18 U/L (ref 5–33)
ANION GAP SERPL CALCULATED.3IONS-SCNC: 12 MMOL/L (ref 9–17)
AST SERPL-CCNC: 12 U/L
BASOPHILS # BLD: 1 % (ref 0–2)
BASOPHILS ABSOLUTE: 0.06 K/UL (ref 0–0.2)
BUN BLDV-MCNC: 14 MG/DL (ref 6–20)
BUN/CREAT BLD: 29 (ref 9–20)
CALCIUM SERPL-MCNC: 9.3 MG/DL (ref 8.6–10.4)
CHLORIDE BLD-SCNC: 103 MMOL/L (ref 98–107)
CHOLESTEROL/HDL RATIO: 3.8
CHOLESTEROL: 133 MG/DL
CO2: 21 MMOL/L (ref 20–31)
CREAT SERPL-MCNC: 0.49 MG/DL (ref 0.5–0.9)
CREATININE URINE: 37.3 MG/DL (ref 28–217)
DIFFERENTIAL TYPE: ABNORMAL
EOSINOPHILS RELATIVE PERCENT: 4 % (ref 1–4)
ESTIMATED AVERAGE GLUCOSE: 183 MG/DL
GFR AFRICAN AMERICAN: >60 ML/MIN
GFR NON-AFRICAN AMERICAN: >60 ML/MIN
GFR SERPL CREATININE-BSD FRML MDRD: ABNORMAL ML/MIN/{1.73_M2}
GFR SERPL CREATININE-BSD FRML MDRD: ABNORMAL ML/MIN/{1.73_M2}
GLUCOSE BLD-MCNC: 173 MG/DL (ref 70–99)
HBA1C MFR BLD: 8 % (ref 4–6)
HCT VFR BLD CALC: 42.4 % (ref 36.3–47.1)
HDLC SERPL-MCNC: 35 MG/DL
HEMOGLOBIN: 13.3 G/DL (ref 11.9–15.1)
IMMATURE GRANULOCYTES: 1 %
LDL CHOLESTEROL: 59 MG/DL (ref 0–130)
LYMPHOCYTES # BLD: 27 % (ref 24–43)
MCH RBC QN AUTO: 29.6 PG (ref 25.2–33.5)
MCHC RBC AUTO-ENTMCNC: 31.4 G/DL (ref 28.4–34.8)
MCV RBC AUTO: 94.4 FL (ref 82.6–102.9)
MICROALBUMIN/CREAT 24H UR: 13 MG/L
MICROALBUMIN/CREAT UR-RTO: 35 MCG/MG CREAT
MONOCYTES # BLD: 6 % (ref 3–12)
NRBC AUTOMATED: 0 PER 100 WBC
PDW BLD-RTO: 12.7 % (ref 11.8–14.4)
PLATELET # BLD: 414 K/UL (ref 138–453)
PLATELET ESTIMATE: ABNORMAL
PMV BLD AUTO: 8.5 FL (ref 8.1–13.5)
POTASSIUM SERPL-SCNC: 4.3 MMOL/L (ref 3.7–5.3)
RBC # BLD: 4.49 M/UL (ref 3.95–5.11)
RBC # BLD: ABNORMAL 10*6/UL
SEG NEUTROPHILS: 61 % (ref 36–65)
SEGMENTED NEUTROPHILS ABSOLUTE COUNT: 5.62 K/UL (ref 1.5–8.1)
SODIUM BLD-SCNC: 136 MMOL/L (ref 135–144)
TRIGL SERPL-MCNC: 197 MG/DL
VLDLC SERPL CALC-MCNC: ABNORMAL MG/DL (ref 1–30)
WBC # BLD: 9.1 K/UL (ref 3.5–11.3)
WBC # BLD: ABNORMAL 10*3/UL

## 2021-05-28 PROCEDURE — 82570 ASSAY OF URINE CREATININE: CPT

## 2021-05-28 PROCEDURE — 83036 HEMOGLOBIN GLYCOSYLATED A1C: CPT

## 2021-05-28 PROCEDURE — 80061 LIPID PANEL: CPT

## 2021-05-28 PROCEDURE — 82043 UR ALBUMIN QUANTITATIVE: CPT

## 2021-05-28 PROCEDURE — 36415 COLL VENOUS BLD VENIPUNCTURE: CPT

## 2021-05-28 PROCEDURE — 84460 ALANINE AMINO (ALT) (SGPT): CPT

## 2021-05-28 PROCEDURE — 84450 TRANSFERASE (AST) (SGOT): CPT

## 2021-05-28 PROCEDURE — 85025 COMPLETE CBC W/AUTO DIFF WBC: CPT

## 2021-05-28 PROCEDURE — 80048 BASIC METABOLIC PNL TOTAL CA: CPT

## 2021-06-01 ENCOUNTER — TELEPHONE (OUTPATIENT)
Dept: PRIMARY CARE CLINIC | Age: 36
End: 2021-06-01

## 2021-06-01 ENCOUNTER — OFFICE VISIT (OUTPATIENT)
Dept: PRIMARY CARE CLINIC | Age: 36
End: 2021-06-01
Payer: COMMERCIAL

## 2021-06-01 VITALS
OXYGEN SATURATION: 98 % | TEMPERATURE: 97.4 F | BODY MASS INDEX: 37.69 KG/M2 | WEIGHT: 233.5 LBS | RESPIRATION RATE: 20 BRPM | DIASTOLIC BLOOD PRESSURE: 62 MMHG | HEART RATE: 78 BPM | SYSTOLIC BLOOD PRESSURE: 108 MMHG

## 2021-06-01 DIAGNOSIS — E11.9 CONTROLLED TYPE 2 DIABETES MELLITUS WITHOUT COMPLICATION, WITH LONG-TERM CURRENT USE OF INSULIN (HCC): Primary | ICD-10-CM

## 2021-06-01 DIAGNOSIS — Z79.4 CONTROLLED TYPE 2 DIABETES MELLITUS WITHOUT COMPLICATION, WITH LONG-TERM CURRENT USE OF INSULIN (HCC): Primary | ICD-10-CM

## 2021-06-01 DIAGNOSIS — J45.20 MILD INTERMITTENT ASTHMA WITHOUT COMPLICATION: ICD-10-CM

## 2021-06-01 PROCEDURE — G8417 CALC BMI ABV UP PARAM F/U: HCPCS | Performed by: NURSE PRACTITIONER

## 2021-06-01 PROCEDURE — G8427 DOCREV CUR MEDS BY ELIG CLIN: HCPCS | Performed by: NURSE PRACTITIONER

## 2021-06-01 PROCEDURE — 4004F PT TOBACCO SCREEN RCVD TLK: CPT | Performed by: NURSE PRACTITIONER

## 2021-06-01 PROCEDURE — 2022F DILAT RTA XM EVC RTNOPTHY: CPT | Performed by: NURSE PRACTITIONER

## 2021-06-01 PROCEDURE — 99214 OFFICE O/P EST MOD 30 MIN: CPT | Performed by: NURSE PRACTITIONER

## 2021-06-01 PROCEDURE — 3052F HG A1C>EQUAL 8.0%<EQUAL 9.0%: CPT | Performed by: NURSE PRACTITIONER

## 2021-06-01 ASSESSMENT — ENCOUNTER SYMPTOMS
HOARSE VOICE: 0
ABDOMINAL PAIN: 0
SPUTUM PRODUCTION: 0
SHORTNESS OF BREATH: 0
SORE THROAT: 0
FREQUENT THROAT CLEARING: 0
RHINORRHEA: 0
HEMOPTYSIS: 0
BACK PAIN: 0
DIARRHEA: 0
CHEST TIGHTNESS: 0
TROUBLE SWALLOWING: 0
CONSTIPATION: 0
WHEEZING: 0
DIFFICULTY BREATHING: 0
VOMITING: 0
NAUSEA: 0
COUGH: 0

## 2021-06-01 ASSESSMENT — PATIENT HEALTH QUESTIONNAIRE - PHQ9
1. LITTLE INTEREST OR PLEASURE IN DOING THINGS: 0
SUM OF ALL RESPONSES TO PHQ QUESTIONS 1-9: 0
SUM OF ALL RESPONSES TO PHQ9 QUESTIONS 1 & 2: 0
2. FEELING DOWN, DEPRESSED OR HOPELESS: 0

## 2021-06-01 NOTE — PATIENT INSTRUCTIONS
SURVEY:    You may be receiving a survey from [employer] regarding your visit today. Please complete the survey to enable us to provide the highest quality of care to you and your family. If you cannot score us a very good on any question, please call the office to discuss how we could have made your experience a very good one. Thank you. Keren Johnson, APRN-CNP  Campos Minor, APRN-CNP  Gilmar Tavera, PEPE Blunt, PEPE Boykin, JOSE Black, PCA    Patient Education        Counting Carbohydrates: Care Instructions  Your Care Instructions     You don't have to eat special foods when you have diabetes. You just have to be careful to eat healthy foods. Carbohydrates (carbs) raise blood sugar higher and quicker than any other nutrient. Carbs are found in desserts, breads and cereals, and fruit. They're also in starchy vegetables. These include potatoes, corn, and grains such as rice and pasta. Carbs are also in milk and yogurt. The more carbs you eat at one time, the higher your blood sugar will rise. Spreading carbs all through the day helps keep your blood sugar levels within your target range. Counting carbs is one of the best ways to keep your blood sugar under control. If you use insulin, counting carbs helps you match the right amount of insulin to the number of grams of carbs in a meal. Then you can change your diet and insulin dose as needed. Testing your blood sugar several times a day can help you learn how carbs affect your blood sugar. A registered dietitian or certified diabetes educator can help you plan meals and snacks. Follow-up care is a key part of your treatment and safety. Be sure to make and go to all appointments, and call your doctor if you are having problems. It's also a good idea to know your test results and keep a list of the medicines you take. How can you care for yourself at home?   Know your daily amount of carbohydrates  Your daily amount depends on several things, such as sugar as much as carbs do. If you eat a lot of these nutrients in a meal, your blood sugar will rise more slowly than it would otherwise. Eat from all food groups  · Eat at least three meals a day. · Plan meals to include food from all the food groups. The food groups include grains, fruits, dairy, proteins, and vegetables. · Talk to your dietitian or diabetes educator about ways to add limited amounts of sweets into your meal plan. · If you drink alcohol, talk to your doctor. It may not be recommended when you are taking certain diabetes medicines. Where can you learn more? Go to https://chpepiceweb."[employer]". org and sign in to your [employer] account. Enter E703 in the [employer] box to learn more about \"Counting Carbohydrates: Care Instructions. \"     If you do not have an account, please click on the \"Sign Up Now\" link. Current as of: August 31, 2020               Content Version: 12.8  © 2006-2021 [employer]. Care instructions adapted under license by [facility] ([facility]). If you have questions about a medical condition or this instruction, always ask your healthcare professional. [place] [address] any warranty or liability for your use of this information. Patient Education        Counting Carbohydrates: Care Instructions  Your Care Instructions     You don't have to eat special foods when you have diabetes. You just have to be careful to eat healthy foods. Carbohydrates (carbs) raise blood sugar higher and quicker than any other nutrient. Carbs are found in desserts, breads and cereals, and fruit. They're also in starchy vegetables. These include potatoes, corn, and grains such as rice and pasta. Carbs are also in milk and yogurt. The more carbs you eat at one time, the higher your blood sugar will rise. Spreading carbs all through the day helps keep your blood sugar levels within your target range.   Counting carbs is one of the best ways to keep your blood sugar under control. If you use insulin, counting carbs helps you match the right amount of insulin to the number of grams of carbs in a meal. Then you can change your diet and insulin dose as needed. Testing your blood sugar several times a day can help you learn how carbs affect your blood sugar. A registered dietitian or certified diabetes educator can help you plan meals and snacks. Follow-up care is a key part of your treatment and safety. Be sure to make and go to all appointments, and call your doctor if you are having problems. It's also a good idea to know your test results and keep a list of the medicines you take. How can you care for yourself at home? Know your daily amount of carbohydrates  Your daily amount depends on several things, such as your weight, how active you are, which diabetes medicines you take, and what your goals are for your blood sugar levels. A registered dietitian or certified diabetes educator can help you plan how many carbs to include in each meal and snack. For most adults, a guideline for the daily amount of carbs is:  · 45 to 60 grams at each meal. That's about the same as 3 to 4 carbohydrate servings. · 15 to 20 grams at each snack. That's about the same as 1 carbohydrate serving. Count carbs  Counting carbs lets you know how much rapid-acting insulin to take before you eat. If you use an insulin pump, you get a constant rate of insulin during the day. So the pump must be programmed at meals. This gives you extra insulin to cover the rise in blood sugar after meals. If you take insulin:  · Learn your own insulin-to-carb ratio. You and your diabetes health professional will figure out the ratio. You can do this by testing your blood sugar after meals. For example, you may need a certain amount of insulin for every 15 grams of carbs. · Add up the carb grams in a meal. Then you can figure out how many units of insulin to take based on your insulin-to-carb ratio. · Exercise lowers blood sugar. You can use less insulin than you would if you were not doing exercise. Keep in mind that timing matters. If you exercise within 1 hour after a meal, your body may need less insulin for that meal than it would if you exercised 3 hours after the meal. Test your blood sugar to find out how exercise affects your need for insulin. If you do or don't take insulin:  · Look at labels on packaged foods. This can tell you how many carbs are in a serving. You can also use guides from the American Diabetes Association. · Be aware of portions, or serving sizes. If a package has two servings and you eat the whole package, you need to double the number of grams of carbohydrate listed for one serving. · Protein, fat, and fiber do not raise blood sugar as much as carbs do. If you eat a lot of these nutrients in a meal, your blood sugar will rise more slowly than it would otherwise. Eat from all food groups  · Eat at least three meals a day. · Plan meals to include food from all the food groups. The food groups include grains, fruits, dairy, proteins, and vegetables. · Talk to your dietitian or diabetes educator about ways to add limited amounts of sweets into your meal plan. · If you drink alcohol, talk to your doctor. It may not be recommended when you are taking certain diabetes medicines. Where can you learn more? Go to https://e-ZassiscottieSCL.[employer]. org and sign in to your [employer] account. Enter C028 in the [facility] box to learn more about \"Counting Carbohydrates: Care Instructions. \"     If you do not have an account, please click on the \"Sign Up Now\" link. Current as of: August 31, 2020               Content Version: 12.8  © 9750-3132 [employer]. Care instructions adapted under license by [facility] ([facility]).  If you have questions about a medical condition or this instruction, always ask your healthcare professional. [address] any warranty or liability for your use of this information.

## 2021-06-01 NOTE — PROGRESS NOTES
Name: Kym Madrid  : 1985         Chief Complaint:     Chief Complaint   Patient presents with    Diabetes     routine check, no concerns    Asthma       History of Present Illness:      Kym Madrid is a 37 y.o.  female who presents with Diabetes (routine check, no concerns) and Asthma      Dayana Holm is here today for a routine office visit. He has been doing well since starting Rybelsus. She does complain of some mild nausea and full feeling but it is improving. She is ready to start the 7 mg dose in about 10 days. Diabetes  She presents for her follow-up diabetic visit. She has type 2 diabetes mellitus. Her disease course has been improving. There are no hypoglycemic associated symptoms. Pertinent negatives for hypoglycemia include no confusion, dizziness, headaches, nervousness/anxiousness, pallor, sleepiness, speech difficulty or sweats. Pertinent negatives for diabetes include no chest pain, no fatigue, no foot paresthesias, no polydipsia, no polyphagia, no polyuria, no weakness and no weight loss. There are no hypoglycemic complications. Pertinent negatives for hypoglycemia complications include no blackouts, no required assistance and no required glucagon injection. Symptoms are stable. There are no diabetic complications. Pertinent negatives for diabetic complications include no CVA, heart disease, nephropathy or peripheral neuropathy. Risk factors for coronary artery disease include diabetes mellitus, dyslipidemia, family history, obesity, sedentary lifestyle and stress. Current diabetic treatment includes insulin injections and oral agent (triple therapy). She is compliant with treatment all of the time. Her weight is stable. She is following a generally unhealthy diet. When asked about meal planning, she reported none. She has had a previous visit with a dietitian. She never participates in exercise. Her home blood glucose trend is decreasing steadily.  Her breakfast blood NINA Brown CNP   Semaglutide (RYBELSUS) 3 MG TABS Take 3 mg by mouth daily 5/13/21  Yes NINA Brown CNP   busPIRone (BUSPAR) 15 MG tablet Take 15 mg by mouth 3 times daily 5/7/21  Yes NINA Brown CNP   gabapentin (NEURONTIN) 100 MG capsule Take 1 capsule by mouth 3 times daily for 30 days. 5/7/21 6/6/21 Yes [de-identified] M NINA Minor CNP   insulin glargine (LANTUS SOLOSTAR) 100 UNIT/ML injection pen Inject 44 Units into the skin nightly 5/7/21  Yes NINA Brown CNP   glucose monitoring kit (FREESTYLE) monitoring kit 1 kit by Does not apply route daily Substitute as needed for insurance requirements. 5/7/21  Yes [de-identified] M NINA Minor CNP   Insulin Pen Needle (PEN NEEDLES) 31G X 6 MM MISC 1 each by Does not apply route daily 5/7/21  Yes NINA Brown CNP   blood glucose test strips (TRUE METRIX BLOOD GLUCOSE TEST) strip 1 each by In Vitro route daily As needed. 5/7/21  Yes [de-identified] M NINA Minor CNP   Lancets MISC Substitute as needed for insurance requirements. Dx 250.00, testing daily.  5/7/21 1/3/25 Yes [de-identified] M NINA Minor CNP   lisinopril (PRINIVIL;ZESTRIL) 2.5 MG tablet Take 1 tablet by mouth once daily 4/22/21  Yes Rj Johnson, APRN - CNP   topiramate (TOPAMAX) 50 MG tablet Take 1 tablet by mouth nightly 4/22/21  Yes Rj Urban Might, NINA Pack CNP   Ertugliflozin L-PyroglutamicAc (STEGLATRO) 15 MG TABS Take 1 tablet by mouth once daily 12/1/20  Yes Rj Urban Might, APRN - CNP   baclofen (LIORESAL) 10 MG tablet Take 1 tablet by mouth 3 times daily 12/1/20  Yes Rj Urban MightNINA CNP   ondansetron (ZOFRAN) 4 MG tablet Take 1 tablet by mouth every 8 hours as needed for Nausea or Vomiting 7/21/20  Yes Rj Urban Might, APRN - CNP   venlafaxine (EFFEXOR XR) 150 MG extended release capsule Take 1 capsule by mouth once daily 7/13/20  Yes Rj Urban Might, APRN - CNP   levocetirizine (XYZAL) 5 MG tablet Take 1 tablet by mouth nightly 7/13/20  Yes Alix Kelly APRN - CNP amitriptyline (ELAVIL) 25 MG tablet Take 1 tablet by mouth nightly 7/13/20  Yes NINA Perez CNP   Cholecalciferol (VITAMIN D-3) 25 MCG (1000 UT) CAPS Take 2 capsules by mouth once daily 7/13/20  Yes NINA Perez CNP   venlafaxine (EFFEXOR XR) 75 MG extended release capsule Take 1 capsule by mouth once daily 7/13/20  Yes NINA Perez - CNP   atorvastatin (LIPITOR) 20 MG tablet Take 1 tablet by mouth once daily 7/13/20  Yes NINA Perez - LINN   albuterol sulfate HFA (VENTOLIN HFA) 108 (90 Base) MCG/ACT inhaler Inhale 2 puffs into the lungs every 6 hours as needed for Wheezing 5/14/19  Yes NINA Perez CNP   Semaglutide (RYBELSUS) 7 MG TABS Take 7 mg by mouth daily  Patient not taking: Reported on 6/1/2021 6/4/21   NINA Moran CNP        Allergies:       Patient has no known allergies. Social History:     Tobacco:    reports that she has been smoking cigarettes. She has been smoking about 0.25 packs per day. She has never used smokeless tobacco.  Alcohol:      reports current alcohol use. Drug Use:  reports current drug use. Drug: Marijuana. Family History:     Family History   Adopted: Yes       Review of Systems:     Positive and Negative as described in HPI    Review of Systems   Constitutional: Negative for chills, fatigue, fever, malaise/fatigue and weight loss. HENT: Negative for congestion, hoarse voice, rhinorrhea, sneezing, sore throat and trouble swallowing. Eyes: Negative for visual disturbance. Respiratory: Negative for cough, hemoptysis, sputum production, shortness of breath and wheezing. Cardiovascular: Negative for chest pain, dyspnea on exertion and palpitations. Gastrointestinal: Negative for abdominal pain, constipation, diarrhea, nausea and vomiting. Endocrine: Negative for polydipsia, polyphagia and polyuria. Genitourinary: Negative for difficulty urinating and dysuria.    Musculoskeletal: Negative for back pain, gait problem, neck pain and neck stiffness. Skin: Negative for pallor and rash. Neurological: Negative for dizziness, focal weakness, syncope, speech difficulty, weakness, light-headedness and headaches. Psychiatric/Behavioral: Negative for confusion. The patient is not nervous/anxious. Physical Exam:   Vitals:  /62 (Position: Sitting)   Pulse 78   Temp 97.4 °F (36.3 °C) (Temporal)   Resp 20   Wt 233 lb 8 oz (105.9 kg)   SpO2 98%   BMI 37.69 kg/m²     Physical Exam  Vitals and nursing note reviewed. Constitutional:       General: She is not in acute distress. Appearance: Normal appearance. She is well-developed. She is obese. She is not ill-appearing. HENT:      Mouth/Throat:      Mouth: Mucous membranes are moist. Mucous membranes are not dry. Eyes:      Conjunctiva/sclera: Conjunctivae normal.   Cardiovascular:      Rate and Rhythm: Normal rate and regular rhythm. Heart sounds: Normal heart sounds. Pulmonary:      Effort: Pulmonary effort is normal.      Breath sounds: Normal breath sounds. No wheezing. Abdominal:      General: Bowel sounds are normal. There is no distension. Palpations: Abdomen is soft. Tenderness: There is no abdominal tenderness. Musculoskeletal:      Cervical back: Normal range of motion and neck supple. Right lower leg: No edema. Left lower leg: No edema. Lymphadenopathy:      Cervical: No cervical adenopathy. Skin:     General: Skin is warm and dry. Findings: No rash. Neurological:      Mental Status: She is alert and oriented to person, place, and time.    Psychiatric:         Mood and Affect: Mood normal.         Behavior: Behavior normal.         Data:     Lab Results   Component Value Date     05/28/2021    K 4.3 05/28/2021     05/28/2021    CO2 21 05/28/2021    BUN 14 05/28/2021    CREATININE 0.49 05/28/2021    GLUCOSE 173 05/28/2021    PROT 7.3 11/20/2019    LABALBU 3.9 11/20/2019    BILITOT <0.10 11/20/2019    ALKPHOS 86 11/20/2019    AST 12 05/28/2021    ALT 18 05/28/2021     Lab Results   Component Value Date    WBC 9.1 05/28/2021    RBC 4.49 05/28/2021    HGB 13.3 05/28/2021    HCT 42.4 05/28/2021    MCV 94.4 05/28/2021    MCH 29.6 05/28/2021    MCHC 31.4 05/28/2021    RDW 12.7 05/28/2021     05/28/2021    MPV 8.5 05/28/2021     Lab Results   Component Value Date    TSH 2.99 11/25/2014     Lab Results   Component Value Date    CHOL 133 05/28/2021    HDL 35 05/28/2021    LABA1C 8.0 05/28/2021       Assessment/Plan:      Diagnosis Orders   1. Controlled type 2 diabetes mellitus without complication, with long-term current use of insulin (HCC)  Basic Metabolic Panel    Hemoglobin A1C   2. Mild intermittent asthma without complication       We will continue all current medications. Increase Rybelsus as instructed. Goal dose is 14 mg daily. We will follow-up in 6 months with labs, sooner if any problems. 1.  Bibiana Lamb received counseling on the following healthy behaviors: nutrition, exercise and medication adherence  2. Patient given educational materials - see patient instructions  3. Was a self-tracking handout given in paper form or via [employer]? No  If yes, see orders or list here. 4.  Discussed use, benefit, and side effects of prescribed medications. Barriers to medication compliance addressed. All patient questions answered. Pt voiced understanding. 5.  Reviewed prior labs and health maintenance  6. Continue current medications, diet and exercise. Completed Refills   Requested Prescriptions      No prescriptions requested or ordered in this encounter         Return in about 6 months (around 12/1/2021) for Check up.

## 2021-06-08 DIAGNOSIS — E78.5 DYSLIPIDEMIA: ICD-10-CM

## 2021-06-08 RX ORDER — ATORVASTATIN CALCIUM 20 MG/1
TABLET, FILM COATED ORAL
Qty: 90 TABLET | Refills: 3 | Status: SHIPPED | OUTPATIENT
Start: 2021-06-08 | End: 2022-05-26

## 2021-06-10 ENCOUNTER — HOSPITAL ENCOUNTER (OUTPATIENT)
Age: 36
Discharge: HOME | End: 2021-06-10
Payer: COMMERCIAL

## 2021-06-10 ENCOUNTER — TELEPHONE (OUTPATIENT)
Dept: PRIMARY CARE CLINIC | Age: 36
End: 2021-06-10

## 2021-06-10 ENCOUNTER — PATIENT MESSAGE (OUTPATIENT)
Dept: PRIMARY CARE CLINIC | Age: 36
End: 2021-06-10

## 2021-06-10 DIAGNOSIS — G62.9 PERIPHERAL POLYNEUROPATHY: Primary | ICD-10-CM

## 2021-06-10 DIAGNOSIS — G62.9 POLYNEUROPATHY: ICD-10-CM

## 2021-06-10 DIAGNOSIS — G62.9 PERIPHERAL POLYNEUROPATHY: ICD-10-CM

## 2021-06-10 LAB
FOLATE: 15.6 NG/ML
TSH SERPL DL<=0.05 MIU/L-ACNC: 1.86 MIU/L (ref 0.3–5)
VITAMIN B-12: 257 PG/ML (ref 232–1245)

## 2021-06-10 PROCEDURE — 82746 ASSAY OF FOLIC ACID SERUM: CPT

## 2021-06-10 PROCEDURE — 82607 VITAMIN B-12: CPT

## 2021-06-10 PROCEDURE — 36415 COLL VENOUS BLD VENIPUNCTURE: CPT

## 2021-06-10 PROCEDURE — 84443 ASSAY THYROID STIM HORMONE: CPT

## 2021-06-10 NOTE — TELEPHONE ENCOUNTER
Please notify patient that lower extremity EMG does show peripheral neuropathy. This is likely from type 2 diabetes. Would like to test her for thyroid disease and vitamin B12/folate deficiency as this could also cause the symptoms. She can have this blood work done at her earliest convenience.   Thank you

## 2021-06-10 NOTE — TELEPHONE ENCOUNTER
Spoke to patient in regards to EMG. Patient informed me she had it done towards middle or end of May. I spoke with Dr. Dalal Overall office and they will fax results over.

## 2021-06-11 ENCOUNTER — TELEPHONE (OUTPATIENT)
Dept: PRIMARY CARE CLINIC | Age: 36
End: 2021-06-11

## 2021-06-11 NOTE — TELEPHONE ENCOUNTER
Called patient and informed her that the numbness and tingling she is having in her lower extremities is from her diabetes and the gabapentin can help with this. Also good glycemic control will help. If any worsening to let us know. Verbalizes understanding.

## 2021-06-11 NOTE — TELEPHONE ENCOUNTER
Called patient and informed her that her labs were normal. She would like to know what the next step is? Health Maintenance   Topic Date Due    Hepatitis B vaccine (1 of 3 - Risk 3-dose series) 06/19/2021 (Originally 9/2/2006)    Varicella vaccine (1 of 2 - 2-dose childhood series) 06/22/2021 (Originally 9/2/1988)    Diabetic retinal exam  06/29/2021 (Originally 3/29/2019)    DTaP/Tdap/Td vaccine (2 - Tdap) 12/01/2021 (Originally 6/28/2015)    Cervical cancer screen  12/01/2021 (Originally 8/19/2018)    COVID-19 Vaccine (1) 05/07/2022 (Originally 9/2/1999)    Diabetic foot exam  12/01/2021    A1C test (Diabetic or Prediabetic)  05/28/2022    Diabetic microalbuminuria test  05/28/2022    Lipid screen  05/28/2022    Potassium monitoring  05/28/2022    Creatinine monitoring  05/28/2022    Pneumococcal 0-64 years Vaccine (2 of 2) 09/02/2052    Flu vaccine  Completed    Hepatitis C screen  Completed    HIV screen  Completed    Hepatitis A vaccine  Aged Out    Hib vaccine  Aged Out    Meningococcal (ACWY) vaccine  Aged Out             (applicable per patient's age: Cancer Screenings, Depression Screening, Fall Risk Screening, Immunizations)    Hemoglobin A1C (%)   Date Value   05/28/2021 8.0 (H)   05/07/2021 8.4   12/02/2020 8.7     Microalb/Crt.  Ratio (mcg/mg creat)   Date Value   05/28/2021 35 (H)     LDL Cholesterol (mg/dL)   Date Value   05/28/2021 59     AST (U/L)   Date Value   05/28/2021 12     ALT (U/L)   Date Value   05/28/2021 18     BUN (mg/dL)   Date Value   05/28/2021 14      (goal A1C is < 7)   (goal LDL is <100) need 30-50% reduction from baseline     BP Readings from Last 3 Encounters:   06/01/21 108/62   05/07/21 132/80   12/23/20 118/75    (goal /80)      All Future Testing planned in [facility]:  Lab Frequency Next Occurrence   [facility] Once 09/81/8808   Basic Metabolic Panel Once 93/17/3812   Hemoglobin A1C Once 11/28/2021       Next Visit Date:  Future Appointments   Date Time Provider Rohini Sui   12/2/2021  3:00 PM Celsa Johnson, APRN - CNP [facility]            Patient Active Problem List:     Dyslipidemia     Insomnia     Anxiety and depression     Adopted     Urinary urgency     Female stress incontinence     Controlled type 2 diabetes mellitus without complication, without long-term current use of insulin (HCC)     Nocturia     Frequency of urination     Mixed incontinence     Acute bronchitis with asthma

## 2021-06-11 NOTE — TELEPHONE ENCOUNTER
----- Message from NINA Alcala CNP sent at 6/11/2021  7:35 AM EDT -----  Please notify patient that their lab results are normal.

## 2021-06-21 DIAGNOSIS — J30.9 CHRONIC ALLERGIC RHINITIS: ICD-10-CM

## 2021-06-21 RX ORDER — LEVOCETIRIZINE DIHYDROCHLORIDE 5 MG/1
5 TABLET, FILM COATED ORAL NIGHTLY
Qty: 90 TABLET | Refills: 3 | OUTPATIENT
Start: 2021-06-21

## 2021-06-24 ENCOUNTER — TELEPHONE (OUTPATIENT)
Dept: PRIMARY CARE CLINIC | Age: 36
End: 2021-06-24

## 2021-06-28 DIAGNOSIS — E11.69 TYPE 2 DIABETES MELLITUS WITH OTHER SPECIFIED COMPLICATION, WITH LONG-TERM CURRENT USE OF INSULIN (HCC): ICD-10-CM

## 2021-06-28 DIAGNOSIS — Z79.4 TYPE 2 DIABETES MELLITUS WITH OTHER SPECIFIED COMPLICATION, WITH LONG-TERM CURRENT USE OF INSULIN (HCC): ICD-10-CM

## 2021-06-29 RX ORDER — ORAL SEMAGLUTIDE 14 MG/1
14 TABLET ORAL DAILY
Qty: 90 TABLET | Refills: 1 | Status: SHIPPED | OUTPATIENT
Start: 2021-06-29 | End: 2022-01-03 | Stop reason: SDUPTHER

## 2021-06-29 RX ORDER — ORAL SEMAGLUTIDE 7 MG/1
TABLET ORAL
Qty: 30 TABLET | Refills: 0 | OUTPATIENT
Start: 2021-06-29

## 2021-06-29 NOTE — TELEPHONE ENCOUNTER
Health Maintenance   Topic Date Due    Varicella vaccine (1 of 2 - 2-dose childhood series) Never done    Hepatitis B vaccine (1 of 3 - Risk 3-dose series) Never done    Diabetic retinal exam  06/29/2021 (Originally 3/29/2019)    DTaP/Tdap/Td vaccine (2 - Tdap) 12/01/2021 (Originally 6/28/2015)    Cervical cancer screen  12/01/2021 (Originally 8/19/2018)    COVID-19 Vaccine (1) 05/07/2022 (Originally 9/2/1999)    Diabetic foot exam  12/01/2021    A1C test (Diabetic or Prediabetic)  05/28/2022    Diabetic microalbuminuria test  05/28/2022    Lipid screen  05/28/2022    Potassium monitoring  05/28/2022    Creatinine monitoring  05/28/2022    Pneumococcal 0-64 years Vaccine (2 of 2 - PPSV23) 09/02/2052    Flu vaccine  Completed    Hepatitis C screen  Completed    HIV screen  Completed    Hepatitis A vaccine  Aged Out    Hib vaccine  Aged Out    Meningococcal (ACWY) vaccine  Aged Out             (applicable per patient's age: Cancer Screenings, Depression Screening, Fall Risk Screening, Immunizations)    Hemoglobin A1C (%)   Date Value   05/28/2021 8.0 (H)   05/07/2021 8.4   12/02/2020 8.7     Microalb/Crt.  Ratio (mcg/mg creat)   Date Value   05/28/2021 35 (H)     LDL Cholesterol (mg/dL)   Date Value   05/28/2021 59     AST (U/L)   Date Value   05/28/2021 12     ALT (U/L)   Date Value   05/28/2021 18     BUN (mg/dL)   Date Value   05/28/2021 14      (goal A1C is < 7)   (goal LDL is <100) need 30-50% reduction from baseline     BP Readings from Last 3 Encounters:   06/01/21 108/62   05/07/21 132/80   12/23/20 118/75    (goal /80)      All Future Testing planned in [facility]:  Lab Frequency Next Occurrence   [facility] DIGITAL DIAGNOSTIC BILATERAL Once 74/57/5098   Basic Metabolic Panel Once 56/91/7734   Hemoglobin A1C Once 11/28/2021       Next Visit Date:  Future Appointments   Date Time Provider Rohini Cabral   12/2/2021  3:00 PM Cait Johnson, APRN - CNP [facility]            Patient

## 2021-07-15 DIAGNOSIS — E55.9 VITAMIN D DEFICIENCY: ICD-10-CM

## 2021-07-15 DIAGNOSIS — F51.01 PRIMARY INSOMNIA: ICD-10-CM

## 2021-07-15 RX ORDER — AMITRIPTYLINE HYDROCHLORIDE 25 MG/1
25 TABLET, FILM COATED ORAL NIGHTLY
Qty: 90 TABLET | Refills: 1 | Status: SHIPPED | OUTPATIENT
Start: 2021-07-15 | End: 2022-01-03 | Stop reason: SDUPTHER

## 2021-07-15 RX ORDER — CHOLECALCIFEROL (VITAMIN D3) 25 MCG
CAPSULE ORAL
Qty: 90 CAPSULE | Refills: 1 | Status: SHIPPED | OUTPATIENT
Start: 2021-07-15 | End: 2021-10-28 | Stop reason: SDUPTHER

## 2021-07-15 NOTE — TELEPHONE ENCOUNTER
Health Maintenance   Topic Date Due    Varicella vaccine (1 of 2 - 2-dose childhood series) Never done    Hepatitis B vaccine (1 of 3 - Risk 3-dose series) Never done    Diabetic retinal exam  03/29/2019    DTaP/Tdap/Td vaccine (2 - Tdap) 12/01/2021 (Originally 6/28/2015)    Cervical cancer screen  12/01/2021 (Originally 8/19/2018)    COVID-19 Vaccine (1) 05/07/2022 (Originally 9/2/1999)    Flu vaccine (1) 09/01/2021    Diabetic foot exam  12/01/2021    A1C test (Diabetic or Prediabetic)  05/28/2022    Diabetic microalbuminuria test  05/28/2022    Lipid screen  05/28/2022    Potassium monitoring  05/28/2022    Creatinine monitoring  05/28/2022    Pneumococcal 0-64 years Vaccine (2 of 2 - PPSV23) 09/02/2052    Hepatitis C screen  Completed    HIV screen  Completed    Hepatitis A vaccine  Aged Out    Hib vaccine  Aged Out    Meningococcal (ACWY) vaccine  Aged Out             (applicable per patient's age: Cancer Screenings, Depression Screening, Fall Risk Screening, Immunizations)    Hemoglobin A1C (%)   Date Value   05/28/2021 8.0 (H)   05/07/2021 8.4   12/02/2020 8.7     Microalb/Crt.  Ratio (mcg/mg creat)   Date Value   05/28/2021 35 (H)     LDL Cholesterol (mg/dL)   Date Value   05/28/2021 59     AST (U/L)   Date Value   05/28/2021 12     ALT (U/L)   Date Value   05/28/2021 18     BUN (mg/dL)   Date Value   05/28/2021 14      (goal A1C is < 7)   (goal LDL is <100) need 30-50% reduction from baseline     BP Readings from Last 3 Encounters:   06/01/21 108/62   05/07/21 132/80   12/23/20 118/75    (goal /80)      All Future Testing planned in [facility]:  Lab Frequency Next Occurrence   [facility] DIGITAL DIAGNOSTIC BILATERAL Once 42/46/5994   Basic Metabolic Panel Once 08/53/1476   Hemoglobin A1C Once 11/28/2021       Next Visit Date:  Future Appointments   Date Time Provider Rohini Cabral   12/2/2021  3:00 PM Helen Hammans Might, APRN - CNP [facility]            Patient Active Problem List:     Dyslipidemia     Insomnia     Anxiety and depression     Adopted     Urinary urgency     Female stress incontinence     Controlled type 2 diabetes mellitus without complication, without long-term current use of insulin (HCC)     Nocturia     Frequency of urination     Mixed incontinence     Acute bronchitis with asthma

## 2021-07-19 ENCOUNTER — TELEPHONE (OUTPATIENT)
Dept: PRIMARY CARE CLINIC | Age: 36
End: 2021-07-19

## 2021-08-02 ENCOUNTER — HOSPITAL ENCOUNTER (OUTPATIENT)
Dept: WOMENS IMAGING | Age: 36
Discharge: HOME | End: 2021-08-04
Payer: COMMERCIAL

## 2021-08-02 DIAGNOSIS — R92.8 ABNORMAL MAMMOGRAM OF RIGHT BREAST: ICD-10-CM

## 2021-08-02 PROCEDURE — G0279 TOMOSYNTHESIS, MAMMO: HCPCS

## 2021-08-03 ENCOUNTER — TELEPHONE (OUTPATIENT)
Dept: PRIMARY CARE CLINIC | Age: 36
End: 2021-08-03

## 2021-08-03 DIAGNOSIS — N60.01 CYST OF RIGHT BREAST: Primary | ICD-10-CM

## 2021-08-03 NOTE — TELEPHONE ENCOUNTER
----- Message from [address] [place] [address], APRN - CNP sent at 8/3/2021  7:30 AM EDT -----  Results are probably benign. Recommend short follow up in 6 months with repeat mammogram.  Thank you.

## 2021-08-03 NOTE — TELEPHONE ENCOUNTER
Contacted patient with lab results. Patient verbalized understanding.  Patient informed of mammogram on 2/3/22 @10:30 am.

## 2021-10-12 DIAGNOSIS — G62.9 POLYNEUROPATHY: ICD-10-CM

## 2021-10-12 RX ORDER — GABAPENTIN 100 MG/1
CAPSULE ORAL
Qty: 90 CAPSULE | Refills: 2 | Status: SHIPPED | OUTPATIENT
Start: 2021-10-12 | End: 2022-01-01 | Stop reason: SDUPTHER

## 2021-10-12 NOTE — TELEPHONE ENCOUNTER
Health Maintenance   Topic Date Due    Varicella vaccine (1 of 2 - 2-dose childhood series) Never done    Hepatitis B vaccine (1 of 3 - Risk 3-dose series) Never done    Cervical cancer screen  Never done    Diabetic retinal exam  03/29/2019    Flu vaccine (1) 09/01/2021    DTaP/Tdap/Td vaccine (2 - Tdap) 12/01/2021 (Originally 6/28/2015)    Diabetic foot exam  12/01/2021    A1C test (Diabetic or Prediabetic)  05/28/2022    Diabetic microalbuminuria test  05/28/2022    Lipid screen  05/28/2022    Potassium monitoring  05/28/2022    Creatinine monitoring  05/28/2022    Pneumococcal 0-64 years Vaccine (2 of 2 - PPSV23) 09/02/2052    COVID-19 Vaccine  Completed    Hepatitis C screen  Completed    HIV screen  Completed    Hepatitis A vaccine  Aged Out    Hib vaccine  Aged Out    Meningococcal (ACWY) vaccine  Aged Out             (applicable per patient's age: Cancer Screenings, Depression Screening, Fall Risk Screening, Immunizations)    Hemoglobin A1C (%)   Date Value   05/28/2021 8.0 (H)   05/07/2021 8.4   12/02/2020 8.7     Microalb/Crt.  Ratio (mcg/mg creat)   Date Value   05/28/2021 35 (H)     LDL Cholesterol (mg/dL)   Date Value   05/28/2021 59     AST (U/L)   Date Value   05/28/2021 12     ALT (U/L)   Date Value   05/28/2021 18     BUN (mg/dL)   Date Value   05/28/2021 14      (goal A1C is < 7)   (goal LDL is <100) need 30-50% reduction from baseline     BP Readings from Last 3 Encounters:   06/01/21 108/62   05/07/21 132/80   12/23/20 118/75    (goal /80)      All Future Testing planned in [facility]:  Lab Frequency Next Occurrence   Basic Metabolic Panel Once 26/15/3624   Hemoglobin A1C Once 11/28/2021   [facility] C[facility] DIGITAL DIAGNOSTIC UNILATERAL RIGHT Once 02/03/2022       Next Visit Date:  Future Appointments   Date Time Provider Rohini Cabral   12/2/2021  3:00 PM Sonny Johnson, APRN - CNP [facility]   2/3/2022 10:30 AM [facility] MAMMOGRAPHY ROOM AT [facility]

## 2021-10-19 DIAGNOSIS — Z79.4 TYPE 2 DIABETES MELLITUS WITH OTHER SPECIFIED COMPLICATION, WITH LONG-TERM CURRENT USE OF INSULIN (HCC): ICD-10-CM

## 2021-10-19 DIAGNOSIS — E11.69 TYPE 2 DIABETES MELLITUS WITH OTHER SPECIFIED COMPLICATION, WITH LONG-TERM CURRENT USE OF INSULIN (HCC): ICD-10-CM

## 2021-10-19 DIAGNOSIS — F41.9 ANXIETY AND DEPRESSION: ICD-10-CM

## 2021-10-19 DIAGNOSIS — E11.9 CONTROLLED TYPE 2 DIABETES MELLITUS WITHOUT COMPLICATION, WITHOUT LONG-TERM CURRENT USE OF INSULIN (HCC): ICD-10-CM

## 2021-10-19 DIAGNOSIS — G43.709 CHRONIC MIGRAINE WITHOUT AURA WITHOUT STATUS MIGRAINOSUS, NOT INTRACTABLE: ICD-10-CM

## 2021-10-19 DIAGNOSIS — F32.A ANXIETY AND DEPRESSION: ICD-10-CM

## 2021-10-19 RX ORDER — LISINOPRIL 2.5 MG/1
TABLET ORAL
Qty: 90 TABLET | Refills: 0 | Status: SHIPPED | OUTPATIENT
Start: 2021-10-19 | End: 2022-01-01 | Stop reason: SDUPTHER

## 2021-10-19 RX ORDER — BUSPIRONE HYDROCHLORIDE 15 MG/1
TABLET ORAL
Qty: 180 TABLET | Refills: 0 | Status: SHIPPED | OUTPATIENT
Start: 2021-10-19 | End: 2022-01-01 | Stop reason: SDUPTHER

## 2021-10-19 RX ORDER — INSULIN GLARGINE 100 [IU]/ML
INJECTION, SOLUTION SUBCUTANEOUS
Qty: 15 ML | Refills: 0 | Status: SHIPPED | OUTPATIENT
Start: 2021-10-19 | End: 2022-01-01 | Stop reason: SDUPTHER

## 2021-10-19 RX ORDER — TOPIRAMATE 50 MG/1
50 TABLET, FILM COATED ORAL NIGHTLY
Qty: 90 TABLET | Refills: 0 | Status: SHIPPED | OUTPATIENT
Start: 2021-10-19 | End: 2022-01-01 | Stop reason: SDUPTHER

## 2021-10-19 NOTE — TELEPHONE ENCOUNTER
Health Maintenance   Topic Date Due    Varicella vaccine (1 of 2 - 2-dose childhood series) Never done    Hepatitis B vaccine (1 of 3 - Risk 3-dose series) Never done    Cervical cancer screen  Never done    Diabetic retinal exam  03/29/2019    Flu vaccine (1) 09/01/2021    DTaP/Tdap/Td vaccine (2 - Tdap) 12/01/2021 (Originally 6/28/2015)    Diabetic foot exam  12/01/2021    A1C test (Diabetic or Prediabetic)  05/28/2022    Diabetic microalbuminuria test  05/28/2022    Lipid screen  05/28/2022    Potassium monitoring  05/28/2022    Creatinine monitoring  05/28/2022    Pneumococcal 0-64 years Vaccine (2 of 2 - PPSV23) 09/02/2052    COVID-19 Vaccine  Completed    Hepatitis C screen  Completed    HIV screen  Completed    Hepatitis A vaccine  Aged Out    Hib vaccine  Aged Out    Meningococcal (ACWY) vaccine  Aged Out             (applicable per patient's age: Cancer Screenings, Depression Screening, Fall Risk Screening, Immunizations)    Hemoglobin A1C (%)   Date Value   05/28/2021 8.0 (H)   05/07/2021 8.4   12/02/2020 8.7     Microalb/Crt.  Ratio (mcg/mg creat)   Date Value   05/28/2021 35 (H)     LDL Cholesterol (mg/dL)   Date Value   05/28/2021 59     AST (U/L)   Date Value   05/28/2021 12     ALT (U/L)   Date Value   05/28/2021 18     BUN (mg/dL)   Date Value   05/28/2021 14      (goal A1C is < 7)   (goal LDL is <100) need 30-50% reduction from baseline     BP Readings from Last 3 Encounters:   06/01/21 108/62   05/07/21 132/80   12/23/20 118/75    (goal /80)      All Future Testing planned in [facility]:  Lab Frequency Next Occurrence   Basic Metabolic Panel Once 18/96/4107   Hemoglobin A1C Once 11/28/2021   EVANGELISTA CINDY DIGITAL DIAGNOSTIC UNILATERAL RIGHT Once 02/03/2022       Next Visit Date:  Future Appointments   Date Time Provider Rohini Cabral   12/2/2021  3:00 PM Ap Johnson, APRN - CNP [facility]   2/3/2022 10:30 AM [facility] MAMMOGRAPHY ROOM AT [facility] Rad Patient Active Problem List:     Dyslipidemia     Insomnia     Anxiety and depression     Adopted     Urinary urgency     Female stress incontinence     Controlled type 2 diabetes mellitus without complication, without long-term current use of insulin (HCC)     Nocturia     Frequency of urination     Mixed incontinence     Acute bronchitis with asthma

## 2021-10-28 DIAGNOSIS — E55.9 VITAMIN D DEFICIENCY: ICD-10-CM

## 2021-10-28 RX ORDER — CHOLECALCIFEROL (VITAMIN D3) 25 MCG
CAPSULE ORAL
Qty: 180 CAPSULE | Refills: 1 | Status: SHIPPED | OUTPATIENT
Start: 2021-10-28 | End: 2022-04-27

## 2021-10-28 NOTE — TELEPHONE ENCOUNTER
Health Maintenance   Topic Date Due    Varicella vaccine (1 of 2 - 2-dose childhood series) Never done    Hepatitis B vaccine (1 of 3 - Risk 3-dose series) Never done    Cervical cancer screen  Never done    Diabetic retinal exam  03/29/2019    Flu vaccine (1) 09/01/2021    DTaP/Tdap/Td vaccine (2 - Tdap) 12/01/2021 (Originally 6/28/2015)    Diabetic foot exam  12/01/2021    A1C test (Diabetic or Prediabetic)  05/28/2022    Diabetic microalbuminuria test  05/28/2022    Lipid screen  05/28/2022    Potassium monitoring  05/28/2022    Creatinine monitoring  05/28/2022    Pneumococcal 0-64 years Vaccine (2 of 2 - PPSV23) 09/02/2052    COVID-19 Vaccine  Completed    Hepatitis C screen  Completed    HIV screen  Completed    Hepatitis A vaccine  Aged Out    Hib vaccine  Aged Out    Meningococcal (ACWY) vaccine  Aged Out             (applicable per patient's age: Cancer Screenings, Depression Screening, Fall Risk Screening, Immunizations)    Hemoglobin A1C (%)   Date Value   05/28/2021 8.0 (H)   05/07/2021 8.4   12/02/2020 8.7     Microalb/Crt.  Ratio (mcg/mg creat)   Date Value   05/28/2021 35 (H)     LDL Cholesterol (mg/dL)   Date Value   05/28/2021 59     AST (U/L)   Date Value   05/28/2021 12     ALT (U/L)   Date Value   05/28/2021 18     BUN (mg/dL)   Date Value   05/28/2021 14      (goal A1C is < 7)   (goal LDL is <100) need 30-50% reduction from baseline     BP Readings from Last 3 Encounters:   06/01/21 108/62   05/07/21 132/80   12/23/20 118/75    (goal /80)      All Future Testing planned in [facility]:  Lab Frequency Next Occurrence   Basic Metabolic Panel Once 22/22/9052   Hemoglobin A1C Once 11/28/2021   EVANGELISTA CINDY DIGITAL DIAGNOSTIC UNILATERAL RIGHT Once 02/03/2022       Next Visit Date:  Future Appointments   Date Time Provider Rohini Cabral   12/2/2021  3:00 PM Russell Johnson, APRN - CNP [facility]   2/3/2022 10:30 AM [facility] MAMMOGRAPHY ROOM AT [facility] Patient Active Problem List:     Dyslipidemia     Insomnia     Anxiety and depression     Adopted     Urinary urgency     Female stress incontinence     Controlled type 2 diabetes mellitus without complication, without long-term current use of insulin (HCC)     Nocturia     Frequency of urination     Mixed incontinence     Acute bronchitis with asthma

## 2021-11-01 ENCOUNTER — OFFICE VISIT (OUTPATIENT)
Dept: PRIMARY CARE CLINIC | Age: 36
End: 2021-11-01
Payer: COMMERCIAL

## 2021-11-01 VITALS
BODY MASS INDEX: 35.62 KG/M2 | WEIGHT: 221.6 LBS | RESPIRATION RATE: 18 BRPM | SYSTOLIC BLOOD PRESSURE: 122 MMHG | TEMPERATURE: 99 F | HEART RATE: 84 BPM | HEIGHT: 66 IN | OXYGEN SATURATION: 98 % | DIASTOLIC BLOOD PRESSURE: 62 MMHG

## 2021-11-01 DIAGNOSIS — J02.9 PHARYNGITIS, UNSPECIFIED ETIOLOGY: ICD-10-CM

## 2021-11-01 DIAGNOSIS — R52 GENERALIZED BODY ACHES: ICD-10-CM

## 2021-11-01 DIAGNOSIS — J06.9 VIRAL UPPER RESPIRATORY TRACT INFECTION: Primary | ICD-10-CM

## 2021-11-01 LAB — S PYO AG THROAT QL: NORMAL

## 2021-11-01 PROCEDURE — G8417 CALC BMI ABV UP PARAM F/U: HCPCS | Performed by: NURSE PRACTITIONER

## 2021-11-01 PROCEDURE — 99213 OFFICE O/P EST LOW 20 MIN: CPT | Performed by: NURSE PRACTITIONER

## 2021-11-01 PROCEDURE — 87880 STREP A ASSAY W/OPTIC: CPT | Performed by: NURSE PRACTITIONER

## 2021-11-01 PROCEDURE — G8427 DOCREV CUR MEDS BY ELIG CLIN: HCPCS | Performed by: NURSE PRACTITIONER

## 2021-11-01 PROCEDURE — G8484 FLU IMMUNIZE NO ADMIN: HCPCS | Performed by: NURSE PRACTITIONER

## 2021-11-01 PROCEDURE — 4004F PT TOBACCO SCREEN RCVD TLK: CPT | Performed by: NURSE PRACTITIONER

## 2021-11-01 RX ORDER — ONDANSETRON 4 MG/1
4 TABLET, FILM COATED ORAL 3 TIMES DAILY PRN
Qty: 15 TABLET | Refills: 0 | Status: SHIPPED | OUTPATIENT
Start: 2021-11-01 | End: 2022-01-01 | Stop reason: SDUPTHER

## 2021-11-01 RX ORDER — DEXTROMETHORPHAN HYDROBROMIDE, GUAIFENESIN AND PSEUDOEPHEDRINE HYDROCHLORIDE 15; 400; 60 MG/1; MG/1; MG/1
1 TABLET ORAL EVERY 6 HOURS PRN
Qty: 28 TABLET | Refills: 0 | Status: SHIPPED | OUTPATIENT
Start: 2021-11-01 | End: 2021-11-08

## 2021-11-01 ASSESSMENT — ENCOUNTER SYMPTOMS
ALLERGIC/IMMUNOLOGIC NEGATIVE: 1
VOMITING: 1
NAUSEA: 1
SHORTNESS OF BREATH: 1
COUGH: 1
SORE THROAT: 1
DIARRHEA: 0
RHINORRHEA: 1
EYES NEGATIVE: 1

## 2021-11-01 NOTE — LETTER
[address] [facility], [address] [facility]      NINA More CNP      11/1/2021     Patient: Michelle Player   YOB: 1985       To Whom It May Concern: It is my medical opinion that Michelle Player should remain out of school/work while acutely ill and awaiting COVID-19 test results. Return to school/work with no retesting should be followed if test is negative AND meets these 3 criteria as outlined by CDC/ODH:     a. No fever without the use of fever reducers for 24 hours  b. Improvement in symptoms  c. At least 7 days since the onset of symptoms. If tests positive for COVID-19, needs minimum of 10 days strict quarantine, improvement of symptoms and 24 hours fever free without fever reducing medications. If you have any questions or concerns, please don't hesitate to call.     Sincerely,        NINA More CNP

## 2021-11-01 NOTE — PROGRESS NOTES
[address] [facility] WALK-IN CARE  [address] [facility] [address] [facility]  Dept: [phone]  Dept Fax: [phone]    Concetta Bell is a 31 y.o. female who presents to the [facility] in Care today for her medical conditions/complaints as noted below. Concetta Bell is c/o of Pharyngitis (x4 days ), Cough (x4 days ), and Generalized Body Aches (x4 days )      HPI:    Concetta Bell is a 31 y.o. female who presents with  URI   This is a new problem. Episode onset: 4 days. The problem has been unchanged. Associated symptoms include congestion, coughing (dry), headaches, nausea, rhinorrhea, a sore throat and vomiting. Pertinent negatives include no diarrhea or ear pain.          Past Medical History:   Diagnosis Date    Acute bronchitis with asthma 6/19/2020    Anxiety and depression     Depression     Female stress incontinence 11/23/2015    Obesity     Type II or unspecified type diabetes mellitus without mention of complication, not stated as uncontrolled     Wears glasses         Current Outpatient Medications   Medication Sig Dispense Refill    ondansetron (ZOFRAN) 4 MG tablet Take 1 tablet by mouth 3 times daily as needed for Nausea or Vomiting 15 tablet 0    Pseudoephedrine-DM-GG (CAPMIST DM) 60- MG TABS Take 1 tablet by mouth every 6 hours as needed (Sinus pressure) 28 tablet 0    metFORMIN (GLUCOPHAGE) 1000 MG tablet Take 1 tablet by mouth 2 times daily (with meals) 180 tablet 1    Cholecalciferol (VITAMIN D-3) 25 MCG (1000 UT) CAPS Take 2 capsules by mouth once daily 180 capsule 1    LANTUS SOLOSTAR 100 UNIT/ML injection pen INJECT 34 UNITS SUBCUTANEOUSLY NIGHTLY 15 mL 0    topiramate (TOPAMAX) 50 MG tablet Take 1 tablet by mouth nightly 90 tablet 0    busPIRone (BUSPAR) 15 MG tablet Take 1 tablet by mouth twice daily 180 tablet 0    lisinopril (PRINIVIL;ZESTRIL) 2.5 MG tablet Take 1 tablet by mouth once daily 90 tablet 0    gabapentin (NEURONTIN) 100 MG capsule TAKE 1 CAPSULE BY MOUTH THREE TIMES DAILY FOR 90 DAYS 90 capsule 2    venlafaxine (EFFEXOR XR) 75 MG extended release capsule Take 1 capsule by mouth once daily 90 capsule 3    venlafaxine (EFFEXOR XR) 150 MG extended release capsule Take 1 capsule by mouth once daily 90 capsule 3    amitriptyline (ELAVIL) 25 MG tablet Take 1 tablet by mouth nightly 90 tablet 1    Semaglutide (RYBELSUS) 14 MG TABS Take 14 mg by mouth daily 90 tablet 1    atorvastatin (LIPITOR) 20 MG tablet Take 1 tablet by mouth once daily 90 tablet 3    montelukast (SINGULAIR) 10 MG tablet Take 1 tablet by mouth once daily 90 tablet 1    glucose monitoring kit (FREESTYLE) monitoring kit 1 kit by Does not apply route daily Substitute as needed for insurance requirements. 1 kit 0    Insulin Pen Needle (PEN NEEDLES) 31G X 6 MM MISC 1 each by Does not apply route daily 100 each 3    blood glucose test strips (TRUE METRIX BLOOD GLUCOSE TEST) strip 1 each by In Vitro route daily As needed. 100 each 3    Lancets MISC Substitute as needed for insurance requirements. Dx 250.00, testing daily. 100 each 3    Ertugliflozin L-PyroglutamicAc (STEGLATRO) 15 MG TABS Take 1 tablet by mouth once daily 90 tablet 3    baclofen (LIORESAL) 10 MG tablet Take 1 tablet by mouth 3 times daily 90 tablet 1    albuterol sulfate HFA (VENTOLIN HFA) 108 (90 Base) MCG/ACT inhaler Inhale 2 puffs into the lungs every 6 hours as needed for Wheezing 1 Inhaler 3    levocetirizine (XYZAL) 5 MG tablet Take 1 tablet by mouth nightly (Patient not taking: Reported on 11/1/2021) 90 tablet 3     No current facility-administered medications for this visit. No Known Allergies    Subjective:      Review of Systems   Constitutional: Positive for appetite change, chills, diaphoresis, fatigue and fever. HENT: Positive for congestion, rhinorrhea and sore throat. Negative for ear pain. Eyes: Negative.     Respiratory: Positive for cough (dry) and shortness of breath. Cardiovascular: Negative. Gastrointestinal: Positive for nausea and vomiting. Negative for diarrhea. Endocrine: Negative. Genitourinary: Negative. Musculoskeletal: Positive for myalgias. Skin: Negative. Allergic/Immunologic: Negative. Neurological: Positive for headaches. Hematological: Negative. Psychiatric/Behavioral: Negative. Objective:     Physical Exam  Vitals and nursing note reviewed. Constitutional:       Appearance: Normal appearance. HENT:      Head: Normocephalic. Right Ear: Tympanic membrane normal.      Left Ear: Tympanic membrane normal.      Nose: Congestion and rhinorrhea present. Rhinorrhea is clear. Right Turbinates: Swollen. Left Turbinates: Swollen (turbinates boggy). Mouth/Throat:      Lips: Pink. Mouth: Mucous membranes are moist.      Pharynx: Posterior oropharyngeal erythema present. Tonsils: No tonsillar exudate. 0 on the right. 0 on the left. Eyes:      Conjunctiva/sclera: Conjunctivae normal.      Pupils: Pupils are equal, round, and reactive to light. Cardiovascular:      Rate and Rhythm: Normal rate and regular rhythm. Heart sounds: Normal heart sounds. Pulmonary:      Effort: Pulmonary effort is normal.      Breath sounds: Normal breath sounds. Abdominal:      General: Bowel sounds are increased. There is no distension. Palpations: Abdomen is soft. Tenderness: There is no abdominal tenderness. Musculoskeletal:         General: Normal range of motion. Cervical back: Normal range of motion. Skin:     General: Skin is warm. Capillary Refill: Capillary refill takes less than 2 seconds. Neurological:      General: No focal deficit present. Mental Status: She is alert and oriented to person, place, and time.    Psychiatric:         Mood and Affect: Mood normal.       /62 (Position: Sitting)   Pulse 84   Temp 99 °F (37.2 °C) (Temporal)   Resp 18   Ht 5' 6\" (1.676 m)   Wt 221 lb 9.6 oz (100.5 kg)   SpO2 98%   BMI 35.77 kg/m²     Assessment:      Diagnosis Orders   1. Viral upper respiratory tract infection     2. Pharyngitis, unspecified etiology  POCT rapid strep A    COVID-19   3. Generalized body aches  COVID-19     Results for orders placed or performed in visit on 11/01/21   POCT rapid strep A   Result Value Ref Range    Strep A Ag None Detected None Detected       Plan:     · Practice meticulous handwashing and cover cough to prevent spread of infection  · Encouraged to increase fluids and rest  · Tylenol/Ibuprofen OTC PRN for pain, discomfort or fever as directed on package  · Warm salt water gargles for sore throat  · Cool mist humidifier  · Capmist and Zofran as prescribed. · Will call with Covid test results. · Hot tea with honey and lemon for cough and sore throat PRN  · Patient instructions given for upper respiratory infection. · To ER or call 911 if any difficulty breathing, shortness of breath, inability to swallow, hives, rash, facial/tongue swelling or temp greater than 103 degrees. · Follow up with PCP or Walk in Care as needed if symptoms worsen or do not improve. No follow-ups on file.     Orders Placed This Encounter   Medications    ondansetron (ZOFRAN) 4 MG tablet     Sig: Take 1 tablet by mouth 3 times daily as needed for Nausea or Vomiting     Dispense:  15 tablet     Refill:  0    Pseudoephedrine-DM-GG (CAPMIST DM) 60- MG TABS     Sig: Take 1 tablet by mouth every 6 hours as needed (Sinus pressure)     Dispense:  28 tablet     Refill:  0        Electronically signed by NINA Levin CNP on 11/2/2021 at 8:29 AM

## 2021-11-01 NOTE — PATIENT INSTRUCTIONS
all instructions on the label. Do not give aspirin to anyone younger than 20. It has been linked to Reye syndrome, a serious illness. · Drink plenty of fluids. Hot fluids, such as tea or soup, may help relieve congestion in your nose and throat. If you have kidney, heart, or liver disease and have to limit fluids, talk with your doctor before you increase the amount of fluids you drink. · Try to clear mucus from your lungs by breathing deeply and coughing. · Gargle with warm salt water once an hour. This can help reduce swelling and throat pain. Use 1 teaspoon of salt mixed in 1 cup of warm water. · Do not smoke or allow others to smoke around you. If you need help quitting, talk to your doctor about stop-smoking programs and medicines. These can increase your chances of quitting for good. To avoid spreading the virus  · Cough or sneeze into a tissue. Then throw the tissue away. · If you don't have a tissue, use your hand to cover your cough or sneeze. Then clean your hand. You can also cough into your sleeve. · Wash your hands often. Use soap and warm water. Wash for 15 to 20 seconds each time. · If you don't have soap and water near you, you can clean your hands with alcohol wipes or gel. When should you call for help? Call your doctor now or seek immediate medical care if:    · You have a new or higher fever.     · Your fever lasts more than 48 hours.     · You have trouble breathing.     · You have a fever with a stiff neck or a severe headache.     · You are sensitive to light.     · You feel very sleepy or confused. Watch closely for changes in your health, and be sure to contact your doctor if:    · You do not get better as expected. Where can you learn more? Go to https://TNT Luxury GrouptereSimple.TVeb.BAC ON TRAC. org and sign in to your The Mad Video account. Enter R791 in the Etohum box to learn more about \"Viral Respiratory Infection: Care Instructions. \"     If you do not have an account, please click on the \"Sign Up Now\" link. Current as of: July 6, 2021               Content Version: 13.0  © 2006-2021 [employer]. Care instructions adapted under license by [facility] ([facility]). If you have questions about a medical condition or this instruction, always ask your healthcare professional. [address] any warranty or liability for your use of this information. Patient Education        Sore Throat: Care Instructions  Your Care Instructions     Infection by bacteria or a virus causes most sore throats. Cigarette smoke, dry air, air pollution, allergies, and yelling can also cause a sore throat. Sore throats can be painful and annoying. Fortunately, most sore throats go away on their own. If you have a bacterial infection, your doctor may prescribe antibiotics. Follow-up care is a key part of your treatment and safety. Be sure to make and go to all appointments, and call your doctor if you are having problems. It's also a good idea to know your test results and keep a list of the medicines you take. How can you care for yourself at home? · If your doctor prescribed antibiotics, take them as directed. Do not stop taking them just because you feel better. You need to take the full course of antibiotics. · Gargle with warm salt water once an hour to help reduce swelling and relieve discomfort. Use 1 teaspoon of salt mixed in 1 cup of warm water. · Take an over-the-counter pain medicine, such as acetaminophen (Tylenol), ibuprofen (Advil, Motrin), or naproxen (Aleve). Read and follow all instructions on the label. · Be careful when taking over-the-counter cold or flu medicines and Tylenol at the same time. Many of these medicines have acetaminophen, which is Tylenol. Read the labels to make sure that you are not taking more than the recommended dose. Too much acetaminophen (Tylenol) can be harmful. · Drink plenty of fluids.  Fluids may help soothe an irritated throat. Hot fluids, such as tea or soup, may help decrease throat pain. · Use over-the-counter throat lozenges to soothe pain. Regular cough drops or hard candy may also help. These should not be given to young children because of the risk of choking. · Do not smoke or allow others to smoke around you. If you need help quitting, talk to your doctor about stop-smoking programs and medicines. These can increase your chances of quitting for good. · Use a vaporizer or humidifier to add moisture to your bedroom. Follow the directions for cleaning the machine. When should you call for help? Call your doctor now or seek immediate medical care if:    · You have new or worse trouble swallowing.     · Your sore throat gets much worse on one side. Watch closely for changes in your health, and be sure to contact your doctor if you do not get better as expected. Where can you learn more? Go to https://MDLIVE.KeyOwner. org and sign in to your Verisim account. Enter H780 in the ShareMagnet box to learn more about \"Sore Throat: Care Instructions. \"     If you do not have an account, please click on the \"Sign Up Now\" link. Current as of: December 2, 2020               Content Version: 13.0  © 2006-2021 Healthwise, Incorporated. Care instructions adapted under license by [facility] ([facility]). If you have questions about a medical condition or this instruction, always ask your healthcare professional. [place] [address] any warranty or liability for your use of this information.

## 2021-11-02 ENCOUNTER — HOSPITAL ENCOUNTER (OUTPATIENT)
Dept: LAB | Age: 36
Setting detail: SPECIMEN
Discharge: HOME | End: 2021-11-02
Payer: COMMERCIAL

## 2021-11-02 DIAGNOSIS — J02.9 PHARYNGITIS, UNSPECIFIED ETIOLOGY: ICD-10-CM

## 2021-11-02 DIAGNOSIS — R52 GENERALIZED BODY ACHES: ICD-10-CM

## 2021-11-02 PROCEDURE — U0003 INFECTIOUS AGENT DETECTION BY NUCLEIC ACID (DNA OR RNA); SEVERE ACUTE RESPIRATORY SYNDROME CORONAVIRUS 2 (SARS-COV-2) (CORONAVIRUS DISEASE [COVID-19]), AMPLIFIED PROBE TECHNIQUE, MAKING USE OF HIGH THROUGHPUT TECHNOLOGIES AS DESCRIBED BY CMS-2020-01-R: HCPCS

## 2021-11-02 PROCEDURE — C9803 HOPD COVID-19 SPEC COLLECT: HCPCS

## 2021-11-02 PROCEDURE — U0005 INFEC AGEN DETEC AMPLI PROBE: HCPCS

## 2021-11-03 ENCOUNTER — TELEPHONE (OUTPATIENT)
Dept: PRIMARY CARE CLINIC | Age: 36
End: 2021-11-03

## 2021-11-03 LAB
SARS-COV-2: NORMAL
SARS-COV-2: NOT DETECTED
SOURCE: NORMAL

## 2021-11-03 NOTE — TELEPHONE ENCOUNTER
----- Message from NINA Noriega CNP sent at 11/3/2021  1:19 PM EDT -----  Please notify patient of negative Covid test.  Ask them how they are feeling today. This is most likely a viral illness and may take 10-14 days before symptoms resolve. Continue supportive treatment at home. May return to school/work after symptoms improve and no fever for 24 hours.   Thanks Adalid Perez

## 2021-11-27 DIAGNOSIS — J30.1 SEASONAL ALLERGIC RHINITIS DUE TO POLLEN: ICD-10-CM

## 2021-11-29 ENCOUNTER — TELEPHONE (OUTPATIENT)
Dept: PRIMARY CARE CLINIC | Age: 36
End: 2021-11-29

## 2021-11-29 DIAGNOSIS — F41.9 ANXIETY AND DEPRESSION: Primary | ICD-10-CM

## 2021-11-29 DIAGNOSIS — F32.A ANXIETY AND DEPRESSION: Primary | ICD-10-CM

## 2021-11-29 RX ORDER — ALPRAZOLAM 0.5 MG/1
0.5 TABLET ORAL 3 TIMES DAILY PRN
Qty: 32 TABLET | Refills: 0 | Status: SHIPPED | OUTPATIENT
Start: 2021-11-29 | End: 2021-12-27

## 2021-11-29 RX ORDER — MONTELUKAST SODIUM 10 MG/1
TABLET ORAL
Qty: 90 TABLET | Refills: 0 | Status: SHIPPED | OUTPATIENT
Start: 2021-11-29 | End: 2022-01-03

## 2021-11-29 NOTE — TELEPHONE ENCOUNTER
Please call patient and let her know that I prescribed Xanax for 14 days to help her get through this difficult time. It may make her drowsy so do not drive after taking medication and trial at night. If continues to have increased depression and anxiety make an appointment to discuss things further.   Thanks [facility]

## 2021-11-29 NOTE — TELEPHONE ENCOUNTER
Health Maintenance   Topic Date Due    Varicella vaccine (1 of 2 - 2-dose childhood series) Never done    Hepatitis B vaccine (1 of 3 - Risk 3-dose series) Never done    Cervical cancer screen  Never done    Diabetic retinal exam  03/29/2019    Flu vaccine (1) 09/01/2021    Diabetic foot exam  12/01/2021    DTaP/Tdap/Td vaccine (2 - Tdap) 12/01/2021 (Originally 6/28/2015)    A1C test (Diabetic or Prediabetic)  05/28/2022    Diabetic microalbuminuria test  05/28/2022    Lipid screen  05/28/2022    Potassium monitoring  05/28/2022    Creatinine monitoring  05/28/2022    Pneumococcal 0-64 years Vaccine (2 of 2 - PPSV23) 09/02/2052    COVID-19 Vaccine  Completed    Hepatitis C screen  Completed    HIV screen  Completed    Hepatitis A vaccine  Aged Out    Hib vaccine  Aged Out    Meningococcal (ACWY) vaccine  Aged Out             (applicable per patient's age: Cancer Screenings, Depression Screening, Fall Risk Screening, Immunizations)    Hemoglobin A1C (%)   Date Value   05/28/2021 8.0 (H)   05/07/2021 8.4   12/02/2020 8.7     Microalb/Crt.  Ratio (mcg/mg creat)   Date Value   05/28/2021 35 (H)     LDL Cholesterol (mg/dL)   Date Value   05/28/2021 59     AST (U/L)   Date Value   05/28/2021 12     ALT (U/L)   Date Value   05/28/2021 18     BUN (mg/dL)   Date Value   05/28/2021 14      (goal A1C is < 7)   (goal LDL is <100) need 30-50% reduction from baseline     BP Readings from Last 3 Encounters:   11/01/21 122/62   06/01/21 108/62   05/07/21 132/80    (goal /80)      All Future Testing planned in [facility]:  Lab Frequency Next Occurrence   Basic Metabolic Panel Once 74/34/9355   Hemoglobin A1C Once 11/28/2021   EVANGELISTA CINDY DIGITAL DIAGNOSTIC UNILATERAL RIGHT Once 02/03/2022       Next Visit Date:  Future Appointments   Date Time Provider Rohini Cabral   12/2/2021  3:00 PM Natalie Johnson, APRN - CNP [facility]   2/3/2022 10:30 AM [facility] MAMMOGRAPHY ROOM AT [facility] Patient Active Problem List:     Dyslipidemia     Insomnia     Anxiety and depression     Adopted     Urinary urgency     Female stress incontinence     Controlled type 2 diabetes mellitus without complication, without long-term current use of insulin (HCC)     Nocturia     Frequency of urination     Mixed incontinence     Acute bronchitis with asthma

## 2021-11-29 NOTE — TELEPHONE ENCOUNTER
Patient called in and said that both of her parents passed away this weekend and she is not taking it very well she is on anxiety meds and depression meds but she was wondering if the dosage could be increased, or any other recommendations on what for her to do?   Thanks

## 2021-11-29 NOTE — TELEPHONE ENCOUNTER
Called and let patient know she voiced understanding. She also stated she would call back in to reschedule her appointment she canceled at a later date.

## 2021-12-15 ENCOUNTER — TELEPHONE (OUTPATIENT)
Dept: PRIMARY CARE CLINIC | Age: 36
End: 2021-12-15

## 2021-12-15 NOTE — TELEPHONE ENCOUNTER
Called and spoke with patient and reminded her to have her labs done that [facility] ordered and to reschedule cancelled appt. Appt rescheduled for January.

## 2021-12-26 DIAGNOSIS — F32.A ANXIETY AND DEPRESSION: ICD-10-CM

## 2021-12-26 DIAGNOSIS — E11.9 CONTROLLED TYPE 2 DIABETES MELLITUS WITHOUT COMPLICATION, WITHOUT LONG-TERM CURRENT USE OF INSULIN (HCC): ICD-10-CM

## 2021-12-26 DIAGNOSIS — F41.9 ANXIETY AND DEPRESSION: ICD-10-CM

## 2021-12-27 RX ORDER — ERTUGLIFLOZIN 15 MG/1
TABLET, FILM COATED ORAL
Qty: 90 TABLET | Refills: 3 | Status: SHIPPED | OUTPATIENT
Start: 2021-12-27 | End: 2022-04-04 | Stop reason: ALTCHOICE

## 2021-12-27 RX ORDER — ALPRAZOLAM 0.5 MG/1
0.5 TABLET ORAL NIGHTLY PRN
Qty: 30 TABLET | Refills: 0 | Status: SHIPPED | OUTPATIENT
Start: 2021-12-27 | End: 2022-01-26 | Stop reason: SDUPTHER

## 2021-12-27 NOTE — TELEPHONE ENCOUNTER
Health Maintenance   Topic Date Due    Varicella vaccine (1 of 2 - 2-dose childhood series) Never done    Hepatitis B vaccine (1 of 3 - Risk 3-dose series) Never done    DTaP/Tdap/Td vaccine (2 - Tdap) 06/28/2015    Cervical cancer screen  Never done    Diabetic retinal exam  03/29/2019    Flu vaccine (1) 09/01/2021    Diabetic foot exam  12/01/2021    COVID-19 Vaccine (3 - Booster for Pfizer series) 12/30/2021    A1C test (Diabetic or Prediabetic)  05/28/2022    Diabetic microalbuminuria test  05/28/2022    Lipid screen  05/28/2022    Potassium monitoring  05/28/2022    Creatinine monitoring  05/28/2022    Pneumococcal 0-64 years Vaccine (2 of 2 - PPSV23) 09/02/2052    Hepatitis C screen  Completed    HIV screen  Completed    Hepatitis A vaccine  Aged Out    Hib vaccine  Aged Out    Meningococcal (ACWY) vaccine  Aged Out             (applicable per patient's age: Cancer Screenings, Depression Screening, Fall Risk Screening, Immunizations)    Hemoglobin A1C (%)   Date Value   05/28/2021 8.0 (H)   05/07/2021 8.4   12/02/2020 8.7     Microalb/Crt.  Ratio (mcg/mg creat)   Date Value   05/28/2021 35 (H)     LDL Cholesterol (mg/dL)   Date Value   05/28/2021 59     AST (U/L)   Date Value   05/28/2021 12     ALT (U/L)   Date Value   05/28/2021 18     BUN (mg/dL)   Date Value   05/28/2021 14      (goal A1C is < 7)   (goal LDL is <100) need 30-50% reduction from baseline     BP Readings from Last 3 Encounters:   11/01/21 122/62   06/01/21 108/62   05/07/21 132/80    (goal /80)      All Future Testing planned in [facility]:  Lab Frequency Next Occurrence   Basic Metabolic Panel Once 11/04/0534   Hemoglobin A1C Once 01/05/2022   EVANGELISTA CINDY DIGITAL DIAGNOSTIC UNILATERAL RIGHT Once 02/03/2022       Next Visit Date:  Future Appointments   Date Time Provider Rohini Cabral   1/13/2022  3:00 PM Cecelia Johnson, APRN - CNP [facility]   2/3/2022 10:30 AM [facility] MAMMOGRAPHY ROOM AT [facility] Patient Active Problem List:     Dyslipidemia     Insomnia     Anxiety and depression     Adopted     Urinary urgency     Female stress incontinence     Controlled type 2 diabetes mellitus without complication, without long-term current use of insulin (HCC)     Nocturia     Frequency of urination     Mixed incontinence     Acute bronchitis with asthma No

## 2021-12-27 NOTE — TELEPHONE ENCOUNTER
Health Maintenance   Topic Date Due    Varicella vaccine (1 of 2 - 2-dose childhood series) Never done    Hepatitis B vaccine (1 of 3 - Risk 3-dose series) Never done    DTaP/Tdap/Td vaccine (2 - Tdap) 06/28/2015    Cervical cancer screen  Never done    Diabetic retinal exam  03/29/2019    Flu vaccine (1) 09/01/2021    Diabetic foot exam  12/01/2021    COVID-19 Vaccine (3 - Booster for Pfizer series) 12/30/2021    A1C test (Diabetic or Prediabetic)  05/28/2022    Diabetic microalbuminuria test  05/28/2022    Lipid screen  05/28/2022    Potassium monitoring  05/28/2022    Creatinine monitoring  05/28/2022    Pneumococcal 0-64 years Vaccine (2 of 2 - PPSV23) 09/02/2052    Hepatitis C screen  Completed    HIV screen  Completed    Hepatitis A vaccine  Aged Out    Hib vaccine  Aged Out    Meningococcal (ACWY) vaccine  Aged Out             (applicable per patient's age: Cancer Screenings, Depression Screening, Fall Risk Screening, Immunizations)    Hemoglobin A1C (%)   Date Value   05/28/2021 8.0 (H)   05/07/2021 8.4   12/02/2020 8.7     Microalb/Crt.  Ratio (mcg/mg creat)   Date Value   05/28/2021 35 (H)     LDL Cholesterol (mg/dL)   Date Value   05/28/2021 59     AST (U/L)   Date Value   05/28/2021 12     ALT (U/L)   Date Value   05/28/2021 18     BUN (mg/dL)   Date Value   05/28/2021 14      (goal A1C is < 7)   (goal LDL is <100) need 30-50% reduction from baseline     BP Readings from Last 3 Encounters:   11/01/21 122/62   06/01/21 108/62   05/07/21 132/80    (goal /80)      All Future Testing planned in [facility]:  Lab Frequency Next Occurrence   Basic Metabolic Panel Once 92/25/2983   Hemoglobin A1C Once 01/05/2022   EVANGELISTA CINDY DIGITAL DIAGNOSTIC UNILATERAL RIGHT Once 02/03/2022       Next Visit Date:  Future Appointments   Date Time Provider Rohini Cabral   1/13/2022  3:00 PM Betina Johnson, APRN - CNP [facility]   2/3/2022 10:30 AM [facility]AMMOGR[facility]HY ROOM AT [facility] Patient Active Problem List:     Dyslipidemia     Insomnia     Anxiety and depression     Adopted     Urinary urgency     Female stress incontinence     Controlled type 2 diabetes mellitus without complication, without long-term current use of insulin (HCC)     Nocturia     Frequency of urination     Mixed incontinence     Acute bronchitis with asthma

## 2022-01-01 DIAGNOSIS — E78.5 DYSLIPIDEMIA: ICD-10-CM

## 2022-01-01 DIAGNOSIS — E55.9 VITAMIN D DEFICIENCY: ICD-10-CM

## 2022-01-01 DIAGNOSIS — J30.1 SEASONAL ALLERGIC RHINITIS DUE TO POLLEN: ICD-10-CM

## 2022-01-01 DIAGNOSIS — G62.9 POLYNEUROPATHY: ICD-10-CM

## 2022-01-01 DIAGNOSIS — F41.9 ANXIETY AND DEPRESSION: ICD-10-CM

## 2022-01-01 DIAGNOSIS — F32.A ANXIETY AND DEPRESSION: ICD-10-CM

## 2022-01-01 DIAGNOSIS — G43.709 CHRONIC MIGRAINE WITHOUT AURA WITHOUT STATUS MIGRAINOSUS, NOT INTRACTABLE: ICD-10-CM

## 2022-01-01 DIAGNOSIS — E11.9 CONTROLLED TYPE 2 DIABETES MELLITUS WITHOUT COMPLICATION, WITHOUT LONG-TERM CURRENT USE OF INSULIN (HCC): ICD-10-CM

## 2022-01-01 DIAGNOSIS — E11.69 TYPE 2 DIABETES MELLITUS WITH OTHER SPECIFIED COMPLICATION, WITH LONG-TERM CURRENT USE OF INSULIN (HCC): ICD-10-CM

## 2022-01-01 DIAGNOSIS — Z79.4 TYPE 2 DIABETES MELLITUS WITH OTHER SPECIFIED COMPLICATION, WITH LONG-TERM CURRENT USE OF INSULIN (HCC): ICD-10-CM

## 2022-01-01 RX ORDER — VENLAFAXINE HYDROCHLORIDE 75 MG/1
CAPSULE, EXTENDED RELEASE ORAL
Qty: 90 CAPSULE | Refills: 3 | Status: CANCELLED | OUTPATIENT
Start: 2022-01-01

## 2022-01-01 RX ORDER — ATORVASTATIN CALCIUM 20 MG/1
TABLET, FILM COATED ORAL
Qty: 90 TABLET | Refills: 3 | Status: CANCELLED | OUTPATIENT
Start: 2022-01-01

## 2022-01-01 RX ORDER — MONTELUKAST SODIUM 10 MG/1
TABLET ORAL
Qty: 90 TABLET | Refills: 0 | Status: CANCELLED | OUTPATIENT
Start: 2022-01-01

## 2022-01-01 RX ORDER — VENLAFAXINE HYDROCHLORIDE 150 MG/1
CAPSULE, EXTENDED RELEASE ORAL
Qty: 90 CAPSULE | Refills: 3 | Status: CANCELLED | OUTPATIENT
Start: 2022-01-01

## 2022-01-01 RX ORDER — CHOLECALCIFEROL (VITAMIN D3) 25 MCG
CAPSULE ORAL
Qty: 180 CAPSULE | Refills: 1 | Status: CANCELLED | OUTPATIENT
Start: 2022-01-01

## 2022-01-03 DIAGNOSIS — F51.01 PRIMARY INSOMNIA: ICD-10-CM

## 2022-01-03 RX ORDER — TOPIRAMATE 50 MG/1
50 TABLET, FILM COATED ORAL NIGHTLY
Qty: 90 TABLET | Refills: 0 | Status: SHIPPED | OUTPATIENT
Start: 2022-01-03 | End: 2022-07-11

## 2022-01-03 RX ORDER — GABAPENTIN 100 MG/1
100 CAPSULE ORAL 3 TIMES DAILY
Qty: 90 CAPSULE | Refills: 2 | Status: SHIPPED | OUTPATIENT
Start: 2022-01-03 | End: 2022-07-05

## 2022-01-03 RX ORDER — ORAL SEMAGLUTIDE 14 MG/1
14 TABLET ORAL DAILY
Qty: 90 TABLET | Refills: 1 | Status: SHIPPED | OUTPATIENT
Start: 2022-01-03 | End: 2022-07-05

## 2022-01-03 RX ORDER — LISINOPRIL 2.5 MG/1
2.5 TABLET ORAL DAILY
Qty: 90 TABLET | Refills: 0 | Status: SHIPPED | OUTPATIENT
Start: 2022-01-03 | End: 2022-04-27

## 2022-01-03 RX ORDER — ONDANSETRON 4 MG/1
4 TABLET, FILM COATED ORAL 3 TIMES DAILY PRN
Qty: 15 TABLET | Refills: 0 | Status: SHIPPED | OUTPATIENT
Start: 2022-01-03 | End: 2022-07-05

## 2022-01-03 RX ORDER — INSULIN GLARGINE 100 [IU]/ML
34 INJECTION, SOLUTION SUBCUTANEOUS NIGHTLY
Qty: 15 ML | Refills: 0 | Status: SHIPPED | OUTPATIENT
Start: 2022-01-03 | End: 2022-04-04 | Stop reason: DRUGHIGH

## 2022-01-03 RX ORDER — AMITRIPTYLINE HYDROCHLORIDE 25 MG/1
25 TABLET, FILM COATED ORAL NIGHTLY
Qty: 90 TABLET | Refills: 1 | Status: SHIPPED | OUTPATIENT
Start: 2022-01-03 | End: 2022-01-26 | Stop reason: SDUPTHER

## 2022-01-03 RX ORDER — BUSPIRONE HYDROCHLORIDE 15 MG/1
15 TABLET ORAL 2 TIMES DAILY
Qty: 180 TABLET | Refills: 0 | Status: SHIPPED | OUTPATIENT
Start: 2022-01-03 | End: 2022-05-26

## 2022-01-03 NOTE — TELEPHONE ENCOUNTER
Health Maintenance   Topic Date Due    Varicella vaccine (1 of 2 - 2-dose childhood series) Never done    Hepatitis B vaccine (1 of 3 - Risk 3-dose series) Never done    DTaP/Tdap/Td vaccine (2 - Tdap) 06/28/2015    Cervical cancer screen  Never done    Diabetic retinal exam  03/29/2019    Flu vaccine (1) 09/01/2021    Diabetic foot exam  12/01/2021    COVID-19 Vaccine (3 - Booster for Pfizer series) 12/30/2021    A1C test (Diabetic or Prediabetic)  05/28/2022    Diabetic microalbuminuria test  05/28/2022    Lipid screen  05/28/2022    Potassium monitoring  05/28/2022    Creatinine monitoring  05/28/2022    Depression Monitoring  06/01/2022    Pneumococcal 0-64 years Vaccine (2 of 2 - PPSV23) 09/02/2052    Hepatitis C screen  Completed    HIV screen  Completed    Hepatitis A vaccine  Aged Out    Hib vaccine  Aged Out    Meningococcal (ACWY) vaccine  Aged Out             (applicable per patient's age: Cancer Screenings, Depression Screening, Fall Risk Screening, Immunizations)    Hemoglobin A1C (%)   Date Value   05/28/2021 8.0 (H)   05/07/2021 8.4   12/02/2020 8.7     Microalb/Crt.  Ratio (mcg/mg creat)   Date Value   05/28/2021 35 (H)     LDL Cholesterol (mg/dL)   Date Value   05/28/2021 59     AST (U/L)   Date Value   05/28/2021 12     ALT (U/L)   Date Value   05/28/2021 18     BUN (mg/dL)   Date Value   05/28/2021 14      (goal A1C is < 7)   (goal LDL is <100) need 30-50% reduction from baseline     BP Readings from Last 3 Encounters:   11/01/21 122/62   06/01/21 108/62   05/07/21 132/80    (goal /80)      All Future Testing planned in [facility]:  Lab Frequency Next Occurrence   Basic Metabolic Panel Once 33/63/8917   Hemoglobin A1C Once 01/05/2022   EVANGELISTA CINDY DIGITAL DIAGNOSTIC UNILATERAL RIGHT Once 02/03/2022       Next Visit Date:  Future Appointments   Date Time Provider Rohini Cabral   1/13/2022  3:00 PM Reyna Johnson, APRN - CNP [facility]   2/3/2022 10:30 [facility]            Patient Active Problem List:     Dyslipidemia     Insomnia     Anxiety and depression     Adopted     Urinary urgency     Female stress incontinence     Controlled type 2 diabetes mellitus without complication, without long-term current use of insulin (HCC)     Nocturia     Frequency of urination     Mixed incontinence     Acute bronchitis with asthma

## 2022-01-03 NOTE — TELEPHONE ENCOUNTER
Health Maintenance   Topic Date Due    Varicella vaccine (1 of 2 - 2-dose childhood series) Never done    Hepatitis B vaccine (1 of 3 - Risk 3-dose series) Never done    DTaP/Tdap/Td vaccine (2 - Tdap) 06/28/2015    Cervical cancer screen  Never done    Diabetic retinal exam  03/29/2019    Flu vaccine (1) 09/01/2021    Diabetic foot exam  12/01/2021    COVID-19 Vaccine (3 - Booster for Pfizer series) 12/30/2021    A1C test (Diabetic or Prediabetic)  05/28/2022    Diabetic microalbuminuria test  05/28/2022    Lipid screen  05/28/2022    Potassium monitoring  05/28/2022    Creatinine monitoring  05/28/2022    Depression Monitoring  06/01/2022    Pneumococcal 0-64 years Vaccine (2 of 2 - PPSV23) 09/02/2052    Hepatitis C screen  Completed    HIV screen  Completed    Hepatitis A vaccine  Aged Out    Hib vaccine  Aged Out    Meningococcal (ACWY) vaccine  Aged Out             (applicable per patient's age: Cancer Screenings, Depression Screening, Fall Risk Screening, Immunizations)    Hemoglobin A1C (%)   Date Value   05/28/2021 8.0 (H)   05/07/2021 8.4   12/02/2020 8.7     Microalb/Crt.  Ratio (mcg/mg creat)   Date Value   05/28/2021 35 (H)     LDL Cholesterol (mg/dL)   Date Value   05/28/2021 59     AST (U/L)   Date Value   05/28/2021 12     ALT (U/L)   Date Value   05/28/2021 18     BUN (mg/dL)   Date Value   05/28/2021 14      (goal A1C is < 7)   (goal LDL is <100) need 30-50% reduction from baseline     BP Readings from Last 3 Encounters:   11/01/21 122/62   06/01/21 108/62   05/07/21 132/80    (goal /80)      All Future Testing planned in [facility]:  Lab Frequency Next Occurrence   Basic Metabolic Panel Once 49/71/8139   Hemoglobin A1C Once 01/05/2022   EVANGELISTA CINDY DIGITAL DIAGNOSTIC UNILATERAL RIGHT Once 02/03/2022       Next Visit Date:  Future Appointments   Date Time Provider Rohini Cabral   1/13/2022  3:00 PM Alda Johnson, APRN - CNP [facility]   2/3/2022 10:30 AM [facility] [address]            Patient Active Problem List:     Dyslipidemia     Insomnia     Anxiety and depression     Adopted     Urinary urgency     Female stress incontinence     Controlled type 2 diabetes mellitus without complication, without long-term current use of insulin ([facility])     Nocturia     Frequency of urination     Mixed incontinence     Acute bronchitis with asthma

## 2022-01-03 NOTE — TELEPHONE ENCOUNTER
Health Maintenance   Topic Date Due    Varicella vaccine (1 of 2 - 2-dose childhood series) Never done    Hepatitis B vaccine (1 of 3 - Risk 3-dose series) Never done    DTaP/Tdap/Td vaccine (2 - Tdap) 06/28/2015    Cervical cancer screen  Never done    Diabetic retinal exam  03/29/2019    Flu vaccine (1) 09/01/2021    Diabetic foot exam  12/01/2021    COVID-19 Vaccine (3 - Booster for Pfizer series) 12/30/2021    A1C test (Diabetic or Prediabetic)  05/28/2022    Diabetic microalbuminuria test  05/28/2022    Lipid screen  05/28/2022    Potassium monitoring  05/28/2022    Creatinine monitoring  05/28/2022    Depression Monitoring  06/01/2022    Pneumococcal 0-64 years Vaccine (2 of 2 - PPSV23) 09/02/2052    Hepatitis C screen  Completed    HIV screen  Completed    Hepatitis A vaccine  Aged Out    Hib vaccine  Aged Out    Meningococcal (ACWY) vaccine  Aged Out             (applicable per patient's age: Cancer Screenings, Depression Screening, Fall Risk Screening, Immunizations)    Hemoglobin A1C (%)   Date Value   05/28/2021 8.0 (H)   05/07/2021 8.4   12/02/2020 8.7     Microalb/Crt.  Ratio (mcg/mg creat)   Date Value   05/28/2021 35 (H)     LDL Cholesterol (mg/dL)   Date Value   05/28/2021 59     AST (U/L)   Date Value   05/28/2021 12     ALT (U/L)   Date Value   05/28/2021 18     BUN (mg/dL)   Date Value   05/28/2021 14      (goal A1C is < 7)   (goal LDL is <100) need 30-50% reduction from baseline     BP Readings from Last 3 Encounters:   11/01/21 122/62   06/01/21 108/62   05/07/21 132/80    (goal /80)      All Future Testing planned in [facility]:  Lab Frequency Next Occurrence   Basic Metabolic Panel Once 36/89/3872   Hemoglobin A1C Once 01/05/2022   EVANGELISTA CINDY DIGITAL DIAGNOSTIC UNILATERAL RIGHT Once 02/03/2022       Next Visit Date:  Future Appointments   Date Time Provider Rohini Cabral   1/13/2022  3:00 PM Payal Johnson, APRN - CNP [facility]   2/3/2022 10:30 AM [facility]            Patient Active Problem List:     Dyslipidemia     Insomnia     Anxiety and depression     Adopted     Urinary urgency     Female stress incontinence     Controlled type 2 diabetes mellitus without complication, without long-term current use of insulin (HCC)     Nocturia     Frequency of urination     Mixed incontinence     Acute bronchitis with asthma

## 2022-01-13 ENCOUNTER — TELEPHONE (OUTPATIENT)
Dept: PRIMARY CARE CLINIC | Age: 37
End: 2022-01-13

## 2022-01-13 NOTE — TELEPHONE ENCOUNTER
Called and spoke with patient and reminded her to have her labs done prior to her appt on 1/26/22. Patient states that her house in in quarantine at this time.

## 2022-01-25 ENCOUNTER — HOSPITAL ENCOUNTER (OUTPATIENT)
Age: 37
Discharge: HOME | End: 2022-01-25
Payer: COMMERCIAL

## 2022-01-25 DIAGNOSIS — Z79.4 CONTROLLED TYPE 2 DIABETES MELLITUS WITHOUT COMPLICATION, WITH LONG-TERM CURRENT USE OF INSULIN (HCC): ICD-10-CM

## 2022-01-25 DIAGNOSIS — E11.9 CONTROLLED TYPE 2 DIABETES MELLITUS WITHOUT COMPLICATION, WITH LONG-TERM CURRENT USE OF INSULIN (HCC): ICD-10-CM

## 2022-01-25 LAB
ANION GAP SERPL CALCULATED.3IONS-SCNC: 15 MMOL/L (ref 9–17)
BUN BLDV-MCNC: 17 MG/DL (ref 6–20)
BUN/CREAT BLD: 35 (ref 9–20)
CALCIUM SERPL-MCNC: 9.9 MG/DL (ref 8.6–10.4)
CHLORIDE BLD-SCNC: 104 MMOL/L (ref 98–107)
CO2: 21 MMOL/L (ref 20–31)
CREAT SERPL-MCNC: 0.48 MG/DL (ref 0.5–0.9)
ESTIMATED AVERAGE GLUCOSE: 126 MG/DL
GFR AFRICAN AMERICAN: >60 ML/MIN
GFR NON-AFRICAN AMERICAN: >60 ML/MIN
GFR SERPL CREATININE-BSD FRML MDRD: ABNORMAL ML/MIN/{1.73_M2}
GFR SERPL CREATININE-BSD FRML MDRD: ABNORMAL ML/MIN/{1.73_M2}
GLUCOSE BLD-MCNC: 98 MG/DL (ref 70–99)
HBA1C MFR BLD: 6 % (ref 4–6)
POTASSIUM SERPL-SCNC: 4.6 MMOL/L (ref 3.7–5.3)
SODIUM BLD-SCNC: 140 MMOL/L (ref 135–144)

## 2022-01-25 PROCEDURE — 36415 COLL VENOUS BLD VENIPUNCTURE: CPT

## 2022-01-25 PROCEDURE — 80048 BASIC METABOLIC PNL TOTAL CA: CPT

## 2022-01-25 PROCEDURE — 83036 HEMOGLOBIN GLYCOSYLATED A1C: CPT

## 2022-01-26 ENCOUNTER — OFFICE VISIT (OUTPATIENT)
Dept: PRIMARY CARE CLINIC | Age: 37
End: 2022-01-26
Payer: COMMERCIAL

## 2022-01-26 VITALS
BODY MASS INDEX: 34.84 KG/M2 | HEIGHT: 66 IN | RESPIRATION RATE: 18 BRPM | HEART RATE: 84 BPM | TEMPERATURE: 97.8 F | SYSTOLIC BLOOD PRESSURE: 118 MMHG | WEIGHT: 216.8 LBS | OXYGEN SATURATION: 98 % | DIASTOLIC BLOOD PRESSURE: 62 MMHG

## 2022-01-26 DIAGNOSIS — E11.9 CONTROLLED TYPE 2 DIABETES MELLITUS WITHOUT COMPLICATION, WITHOUT LONG-TERM CURRENT USE OF INSULIN (HCC): Primary | ICD-10-CM

## 2022-01-26 DIAGNOSIS — Z23 NEED FOR INFLUENZA VACCINATION: ICD-10-CM

## 2022-01-26 DIAGNOSIS — F32.A ANXIETY AND DEPRESSION: ICD-10-CM

## 2022-01-26 DIAGNOSIS — E78.5 DYSLIPIDEMIA: ICD-10-CM

## 2022-01-26 DIAGNOSIS — F41.9 ANXIETY AND DEPRESSION: ICD-10-CM

## 2022-01-26 DIAGNOSIS — F51.01 PRIMARY INSOMNIA: ICD-10-CM

## 2022-01-26 PROCEDURE — 3044F HG A1C LEVEL LT 7.0%: CPT | Performed by: NURSE PRACTITIONER

## 2022-01-26 PROCEDURE — G8427 DOCREV CUR MEDS BY ELIG CLIN: HCPCS | Performed by: NURSE PRACTITIONER

## 2022-01-26 PROCEDURE — 99214 OFFICE O/P EST MOD 30 MIN: CPT | Performed by: NURSE PRACTITIONER

## 2022-01-26 PROCEDURE — G8417 CALC BMI ABV UP PARAM F/U: HCPCS | Performed by: NURSE PRACTITIONER

## 2022-01-26 PROCEDURE — 4004F PT TOBACCO SCREEN RCVD TLK: CPT | Performed by: NURSE PRACTITIONER

## 2022-01-26 PROCEDURE — G8482 FLU IMMUNIZE ORDER/ADMIN: HCPCS | Performed by: NURSE PRACTITIONER

## 2022-01-26 PROCEDURE — 90674 CCIIV4 VAC NO PRSV 0.5 ML IM: CPT | Performed by: NURSE PRACTITIONER

## 2022-01-26 PROCEDURE — 2022F DILAT RTA XM EVC RTNOPTHY: CPT | Performed by: NURSE PRACTITIONER

## 2022-01-26 RX ORDER — ALPRAZOLAM 0.5 MG/1
0.5 TABLET ORAL NIGHTLY PRN
Qty: 30 TABLET | Refills: 0 | Status: SHIPPED | OUTPATIENT
Start: 2022-01-26 | End: 2022-02-25

## 2022-01-26 RX ORDER — AMITRIPTYLINE HYDROCHLORIDE 50 MG/1
50 TABLET, FILM COATED ORAL NIGHTLY
Qty: 90 TABLET | Refills: 1 | Status: SHIPPED | OUTPATIENT
Start: 2022-01-26 | End: 2022-08-02

## 2022-01-26 SDOH — ECONOMIC STABILITY: FOOD INSECURITY: WITHIN THE PAST 12 MONTHS, YOU WORRIED THAT YOUR FOOD WOULD RUN OUT BEFORE YOU GOT MONEY TO BUY MORE.: PATIENT DECLINED

## 2022-01-26 SDOH — ECONOMIC STABILITY: FOOD INSECURITY: WITHIN THE PAST 12 MONTHS, THE FOOD YOU BOUGHT JUST DIDN'T LAST AND YOU DIDN'T HAVE MONEY TO GET MORE.: PATIENT DECLINED

## 2022-01-26 ASSESSMENT — ENCOUNTER SYMPTOMS
ABDOMINAL PAIN: 0
VOMITING: 0
BACK PAIN: 0
COUGH: 0
WHEEZING: 0
SHORTNESS OF BREATH: 0
CONSTIPATION: 0
RHINORRHEA: 0
DIARRHEA: 0
TROUBLE SWALLOWING: 0
SORE THROAT: 0
VISUAL CHANGE: 0
NAUSEA: 0

## 2022-01-26 ASSESSMENT — PATIENT HEALTH QUESTIONNAIRE - PHQ9
SUM OF ALL RESPONSES TO PHQ QUESTIONS 1-9: 2
2. FEELING DOWN, DEPRESSED OR HOPELESS: 1
SUM OF ALL RESPONSES TO PHQ QUESTIONS 1-9: 2
SUM OF ALL RESPONSES TO PHQ9 QUESTIONS 1 & 2: 2
1. LITTLE INTEREST OR PLEASURE IN DOING THINGS: 1

## 2022-01-26 ASSESSMENT — SOCIAL DETERMINANTS OF HEALTH (SDOH): HOW HARD IS IT FOR YOU TO PAY FOR THE VERY BASICS LIKE FOOD, HOUSING, MEDICAL CARE, AND HEATING?: PATIENT DECLINED

## 2022-01-26 NOTE — PATIENT INSTRUCTIONS
SURVEY:     You may be receiving a survey from [employer] regarding your visit today. Please complete the survey to enable us to provide the highest quality of care to you and your family. If you cannot score us a very good on any question, please call the office to discuss how we could have made your experience a very good one. Thank you. Sandhya Johnson, APRN-LINN  Herbert Herrera, LINN  Melissa Weathers, LPN  Caitlyn Torres, LPN  Tere Bender, A  Julianna Clark, SAJAN Caceres, CMA  Adriana, PCA    Patient Education        Counting Carbohydrates for Diabetes: Care Instructions  Your Care Instructions     You don't have to eat special foods when you have diabetes. You just have to be careful to eat healthy foods. Carbohydrates (carbs) raise blood sugar higher and quicker than any other nutrient. Carbs are found in desserts, breads and cereals, and fruit. They're also in starchy vegetables. These include potatoes, corn, and grains such as rice and pasta. Carbs are also in milk and yogurt. The more carbs you eat at one time, the higher your blood sugar will rise. Spreading carbs all through the day helps keep your blood sugar levels within your target range. Counting carbs is one of the best ways to keep your blood sugar under control. If you use insulin, counting carbs helps you match the right amount of insulin to the number of grams of carbs in a meal. Then you can change your diet and insulin dose as needed. Testing your blood sugar several times a day can help you learn how carbs affect your blood sugar. A registered dietitian or certified diabetes educator can help you plan meals and snacks. Follow-up care is a key part of your treatment and safety. Be sure to make and go to all appointments, and call your doctor if you are having problems. It's also a good idea to know your test results and keep a list of the medicines you take. How can you care for yourself at home?   Know your daily amount of carbohydrates  Your daily amount depends on several things, such as your weight, how active you are, which diabetes medicines you take, and what your goals are for your blood sugar levels. A registered dietitian or certified diabetes educator can help you plan how many carbs to include in each meal and snack. For most adults, a guideline for the daily amount of carbs is:  · 45 to 60 grams at each meal. That's about the same as 3 to 4 carbohydrate servings. · 15 to 20 grams at each snack. That's about the same as 1 carbohydrate serving. Count carbs  Counting carbs lets you know how much rapid-acting insulin to take before you eat. If you use an insulin pump, you get a constant rate of insulin during the day. So the pump must be programmed at meals. This gives you extra insulin to cover the rise in blood sugar after meals. If you take insulin:  · Learn your own insulin-to-carb ratio. You and your diabetes health professional will figure out the ratio. You can do this by testing your blood sugar after meals. For example, you may need a certain amount of insulin for every 15 grams of carbs. · Add up the carb grams in a meal. Then you can figure out how many units of insulin to take based on your insulin-to-carb ratio. · Exercise lowers blood sugar. You can use less insulin than you would if you were not doing exercise. Keep in mind that timing matters. If you exercise within 1 hour after a meal, your body may need less insulin for that meal than it would if you exercised 3 hours after the meal. Test your blood sugar to find out how exercise affects your need for insulin. If you do or don't take insulin:  · Look at labels on packaged foods. This can tell you how many carbs are in a serving. You can also use guides from the American Diabetes Association. · Be aware of portions, or serving sizes. If a package has two servings and you eat the whole package, you need to double the number of grams of carbohydrate listed for one serving.   · Protein, fat, and fiber do not raise blood sugar as much as carbs do. If you eat a lot of these nutrients in a meal, your blood sugar will rise more slowly than it would otherwise. Eat from all food groups  · Eat at least three meals a day. · Plan meals to include food from all the food groups. The food groups include grains, fruits, dairy, proteins, and vegetables. · Talk to your dietitian or diabetes educator about ways to add limited amounts of sweets into your meal plan. · If you drink alcohol, talk to your doctor. It may not be recommended when you are taking certain diabetes medicines. Where can you learn more? Go to https://nGAPpeDesinoeweb.[employer]. org and sign in to your [employer] account. Enter Y674 in the HouseTab box to learn more about \"Counting Carbohydrates for Diabetes: Care Instructions. \"     If you do not have an account, please click on the \"Sign Up Now\" link. Current as of: July 28, 2021               Content Version: 13.1  © 2006-2021 [employer] Inc[employer]. Care instructions adapted under license by [facility] ([facility]). If you have questions about a medical condition or this instruction, always ask your healthcare professional. [place] [address] any warranty or liability for your use of this information.

## 2022-01-26 NOTE — PROGRESS NOTES
Vaccine Information Sheet, \"Influenza - Inactivated\"  given to Mohamud Stewart, or parent/legal guardian of  Mohamud Stewart and verbalized understanding. Patient responses:    Have you ever had a reaction to a flu vaccine? No  Are you able to eat eggs without adverse effects? Yes  Do you have any current illness? No  Have you ever had [place] Syndrome? No    Flu vaccine given per order. Please see immunization tab. After obtaining consent, and per orders of Silvio Johnson CNP, injection of Flucelvax Quadrivalent given in Left deltoid by Janis Adhikari. Patient instructed to remain in clinic for 20 minutes afterwards, and to report any adverse reaction to me immediately.

## 2022-01-26 NOTE — PROGRESS NOTES
Name: Kai Snow  : 1985         Chief Complaint:     Chief Complaint   Patient presents with    Diabetes     routine check, feeling \"so so\"    Hyperlipidemia    Mental Health Problem       History of Present Illness:      Kai Snow is a 31 y.o.  female who presents with Diabetes (routine check, feeling \"so so\"), Hyperlipidemia, and [facility] [place] today for a routine office visit. Unfortunately she is dealing with quite a bit of stress at the moment. Both of her parents overdosed on Thanksgiving day and . She was not there for the actual event but found out about it the next day from her grandfather. See below for further comment. Insomnia-worsening, patient states she is using amitriptyline and alprazolam to sleep. Diabetes  She presents for her follow-up diabetic visit. She has type 2 diabetes mellitus. Her disease course has been improving. Hypoglycemia symptoms include nervousness/anxiousness. Pertinent negatives for hypoglycemia include no confusion, dizziness, headaches, pallor, sleepiness or speech difficulty. Pertinent negatives for diabetes include no chest pain, no fatigue, no foot paresthesias, no polydipsia, no polyphagia, no polyuria, no visual change and no weakness. There are no hypoglycemic complications. Pertinent negatives for hypoglycemia complications include no blackouts, no required assistance and no required glucagon injection. Symptoms are stable. There are no diabetic complications. Pertinent negatives for diabetic complications include no CVA, heart disease, nephropathy or peripheral neuropathy. Risk factors for coronary artery disease include diabetes mellitus, dyslipidemia, family history, obesity, sedentary lifestyle and stress. Current diabetic treatment includes insulin injections and oral agent (triple therapy). She is compliant with treatment all of the time. Her weight is decreasing steadily.  She is following a generally healthy diet. Meal planning includes avoidance of concentrated sweets. She has had a previous visit with a dietitian. She never participates in exercise. Her home blood glucose trend is decreasing steadily. Her breakfast blood glucose range is generally 130-140 mg/dl. An ACE inhibitor/angiotensin II receptor blocker is being taken. She does not see a podiatrist.Eye exam is current. Hyperlipidemia  This is a chronic problem. The current episode started more than 1 year ago. The problem is controlled. Recent lipid tests were reviewed and are normal. Exacerbating diseases include diabetes and obesity. She has no history of hypothyroidism or liver disease. Factors aggravating her hyperlipidemia include fatty foods and smoking. Pertinent negatives include no chest pain, focal sensory loss, focal weakness or shortness of breath. Current antihyperlipidemic treatment includes statins. The current treatment provides moderate improvement of lipids. Compliance problems include adherence to exercise and adherence to diet. Risk factors for coronary artery disease include diabetes mellitus, dyslipidemia, obesity and stress. Mental Health Problem  The primary symptoms include dysphoric mood. The primary symptoms do not include delusions, hallucinations, bizarre behavior, disorganized speech, negative symptoms or somatic symptoms. Primary symptoms comment: NERVOUSNESS. The current episode started more than 1 month ago. This is a chronic problem. The onset of the illness is precipitated by emotional stress. The degree of incapacity that she is experiencing as a consequence of her illness is moderate. Sequelae of the illness include harmed interpersonal relations. Additional symptoms of the illness include anhedonia, insomnia, attention impairment, flight of ideas and distractible.  Additional symptoms of the illness do not include hypersomnia, appetite change, unexpected weight change, fatigue, agitation, psychomotor retardation, feelings of worthlessness, euphoric mood, increased goal-directed activity, inflated self-esteem, decreased need for sleep, poor judgment, visual change, headaches or abdominal pain. She does not admit to suicidal ideas. She does not have a plan to attempt suicide. She does not contemplate harming herself. She has not already injured self. She does not contemplate injuring another person. She has not already  injured another person. Risk factors that are present for mental illness include substance abuse. Past Medical History:     Past Medical History:   Diagnosis Date    Acute bronchitis with asthma 6/19/2020    Anxiety and depression     Depression     Female stress incontinence 11/23/2015    Obesity     Type II or unspecified type diabetes mellitus without mention of complication, not stated as uncontrolled     Wears glasses       Reviewed all health maintenance requirements and ordered appropriate tests  Health Maintenance Due   Topic Date Due    Flu vaccine (1) 09/01/2021       Past Surgical History:     Past Surgical History:   Procedure Laterality Date    TUBAL LIGATION      WISDOM TOOTH EXTRACTION          Medications:       Prior to Admission medications    Medication Sig Start Date End Date Taking? Authorizing Provider   NONFORMULARY Medical Marijuana Card   Yes Historical Provider, MD   ALPRAZolam Lauren Co) 0.5 MG tablet Take 1 tablet by mouth nightly as needed for Sleep or Anxiety for up to 30 days. 1/26/22 2/25/22 Yes Baldemar Jonny Might, APRN - CNP   amitriptyline (ELAVIL) 50 MG tablet Take 1 tablet by mouth nightly 1/26/22  Yes Baldemar Pipe Might, APRN - CNP   montelukast (SINGULAIR) 10 MG tablet Take 1 tablet by mouth once daily 1/3/22  Yes Baldemar Pipchelo Might, APRN - CNP   gabapentin (NEURONTIN) 100 MG capsule Take 1 capsule by mouth 3 times daily for 90 days.  1/3/22 4/3/22 Yes Baldemar Pipe Might, APRN - CNP   insulin glargine (LANTUS SOLOSTAR) 100 UNIT/ML injection pen Inject 34 Units into the skin nightly  Patient taking differently: Inject 42 Units into the skin nightly  1/3/22  Yes NINA Herring CNP   topiramate (TOPAMAX) 50 MG tablet Take 1 tablet by mouth nightly 1/3/22  Yes NINA Herring CNP   busPIRone (BUSPAR) 15 MG tablet Take 15 mg by mouth 2 times daily 1/3/22  Yes NINA Herring CNP   lisinopril (PRINIVIL;ZESTRIL) 2.5 MG tablet Take 1 tablet by mouth daily 1/3/22  Yes NINA Herring CNP   ondansetron (ZOFRAN) 4 MG tablet Take 1 tablet by mouth 3 times daily as needed for Nausea or Vomiting 1/3/22  Yes NINA Sweeney CNP   Semaglutide (RYBELSUS) 14 MG TABS Take 14 mg by mouth daily 1/3/22  Yes NINA Herring CNP   STEGLATRO 15 MG TABS Take 1 tablet by mouth once daily 12/27/21  Yes NINA Herring CNP   metFORMIN (GLUCOPHAGE) 1000 MG tablet Take 1 tablet by mouth 2 times daily (with meals) 10/28/21  Yes NINA Herring CNP   Cholecalciferol (VITAMIN D-3) 25 MCG (1000 UT) CAPS Take 2 capsules by mouth once daily 10/28/21  Yes NINA Herring CNP   venlafaxine (EFFEXOR XR) 75 MG extended release capsule Take 1 capsule by mouth once daily 7/23/21  Yes NINA Herring CNP   venlafaxine (EFFEXOR XR) 150 MG extended release capsule Take 1 capsule by mouth once daily 7/23/21  Yes NINA Herring CNP   atorvastatin (LIPITOR) 20 MG tablet Take 1 tablet by mouth once daily 6/8/21  Yes NINA Herring CNP   glucose monitoring kit (FREESTYLE) monitoring kit 1 kit by Does not apply route daily Substitute as needed for insurance requirements. 5/7/21  Yes [de-identified] M NINA Minor CNP   Insulin Pen Needle (PEN NEEDLES) 31G X 6 MM MISC 1 each by Does not apply route daily 5/7/21  Yes Campos M Deats, APRN - CNP   blood glucose test strips (TRUE METRIX BLOOD GLUCOSE TEST) strip 1 each by In Vitro route daily As needed. 5/7/21  Yes [de-identified] M Mily, APRN - CNP   Lancets MISC Substitute as needed for insurance requirements.  Dx 250.00, testing daily. 5/7/21 1/3/25 Yes [de-identified] M NINA Minor CNP        Allergies:       Patient has no known allergies. Social History:     Tobacco:    reports that she has been smoking cigarettes. She has been smoking about 0.25 packs per day. She has never used smokeless tobacco.  Alcohol:      reports previous alcohol use. Drug Use:  reports current drug use. Drug: Marijuana Fredick Copping). Family History:     Family History   Adopted: Yes   Problem Relation Age of Onset    Other Mother         OVER Dose    Other Father         Over dose       Review of Systems:     Positive and Negative as described in HPI    Review of Systems   Constitutional: Negative for appetite change, chills, fatigue, fever and unexpected weight change. HENT: Negative for congestion, rhinorrhea, sneezing, sore throat and trouble swallowing. Eyes: Negative for visual disturbance. Respiratory: Negative for cough, shortness of breath and wheezing. Cardiovascular: Negative for chest pain and palpitations. Gastrointestinal: Negative for abdominal pain, constipation, diarrhea, nausea and vomiting. Endocrine: Negative for polydipsia, polyphagia and polyuria. Genitourinary: Negative for difficulty urinating and dysuria. Musculoskeletal: Negative for back pain, gait problem, neck pain and neck stiffness. Skin: Negative for pallor and rash. Neurological: Negative for dizziness, focal weakness, syncope, speech difficulty, weakness, light-headedness and headaches. Psychiatric/Behavioral: Positive for dysphoric mood and sleep disturbance. Negative for agitation, behavioral problems, confusion, decreased concentration, hallucinations, self-injury and suicidal ideas. The patient is nervous/anxious and has insomnia. The patient is not hyperactive.         Physical Exam:   Vitals:  /62 (Position: Sitting)   Pulse 84   Temp 97.8 °F (36.6 °C) (Temporal)   Resp 18   Ht 5' 6\" (1.676 m)   Wt 216 lb 12.8 oz (98.3 kg)   SpO2 98% BMI 34.99 kg/m²     Physical Exam  Vitals and nursing note reviewed. Constitutional:       General: She is not in acute distress. Appearance: Normal appearance. She is well-developed. She is obese. She is not ill-appearing. HENT:      Mouth/Throat:      Mouth: Mucous membranes are moist. Mucous membranes are not dry. Eyes:      Conjunctiva/sclera: Conjunctivae normal.   Cardiovascular:      Rate and Rhythm: Normal rate and regular rhythm. Heart sounds: Normal heart sounds. Pulmonary:      Effort: Pulmonary effort is normal.      Breath sounds: Normal breath sounds. No wheezing. Abdominal:      General: Bowel sounds are normal. There is no distension. Palpations: Abdomen is soft. Tenderness: There is no abdominal tenderness. Musculoskeletal:      Cervical back: Normal range of motion and neck supple. Right lower leg: No edema. Left lower leg: No edema. Lymphadenopathy:      Cervical: No cervical adenopathy. Skin:     General: Skin is warm and dry. Findings: No rash. Neurological:      Mental Status: She is alert and oriented to person, place, and time. Psychiatric:         Attention and Perception: Attention normal.         Mood and Affect: Affect normal. Mood is anxious (mild). Mood is not depressed. Speech: Speech normal.         Behavior: Behavior normal. Behavior is cooperative. Thought Content: Thought content normal. Thought content does not include homicidal or suicidal ideation. Thought content does not include homicidal or suicidal plan.          Cognition and Memory: Cognition normal.         Judgment: Judgment normal.         Data:     Lab Results   Component Value Date     01/25/2022    K 4.6 01/25/2022     01/25/2022    CO2 21 01/25/2022    BUN 17 01/25/2022    CREATININE 0.48 01/25/2022    GLUCOSE 98 01/25/2022    PROT 7.3 11/20/2019    LABALBU 3.9 11/20/2019    BILITOT <0.10 11/20/2019    ALKPHOS 86 11/20/2019    AST 12 05/28/2021    ALT 18 05/28/2021     Lab Results   Component Value Date    WBC 9.1 05/28/2021    RBC 4.49 05/28/2021    HGB 13.3 05/28/2021    HCT 42.4 05/28/2021    MCV 94.4 05/28/2021    MCH 29.6 05/28/2021    MCHC 31.4 05/28/2021    RDW 12.7 05/28/2021     05/28/2021    MPV 8.5 05/28/2021     Lab Results   Component Value Date    TSH 1.86 06/10/2021     Lab Results   Component Value Date    CHOL 133 05/28/2021    HDL 35 05/28/2021    LABA1C 6.0 01/25/2022       Assessment/Plan:      Diagnosis Orders   1. Controlled type 2 diabetes mellitus without complication, without long-term current use of insulin (HCC)  CBC Auto Differential    ALT    AST    Basic Metabolic Panel    Hemoglobin A1C    Lipid Panel    Microalbumin, Ur   2. Dyslipidemia     3. Anxiety and depression  ALPRAZolam (XANAX) 0.5 MG tablet   4. Primary insomnia  amitriptyline (ELAVIL) 50 MG tablet   5. Need for influenza vaccination  INFLUENZA, MDCK QUADV, 2 YRS AND OLDER, IM, PF, PREFILL SYR OR SDV, 0.5ML (FLUCELVAX QUADV, PF)     Diabetes is greatly improved. We will continue all current medications. We will increase amitriptyline dose to 50 mg nightly. May use alprazolam only for extreme anxiety. We will see her back in 6 months with repeat labs, sooner if any problems. 1.  Yulisa Christensen received counseling on the following healthy behaviors: nutrition, exercise and medication adherence  2. Patient given educational materials - see patient instructions  3. Was a self-tracking handout given in paper form or via [employer]? No  If yes, see orders or list here. 4.  Discussed use, benefit, and side effects of prescribed medications. Barriers to medication compliance addressed. All patient questions answered. Pt voiced understanding. 5.  Reviewed prior labs and health maintenance  6. Continue current medications, diet and exercise.     Completed Refills   Requested Prescriptions     Signed Prescriptions Disp Refills    ALPRAZolam Khai Jimenez 0.5 MG tablet 30 tablet 0     Sig: Take 1 tablet by mouth nightly as needed for Sleep or Anxiety for up to 30 days.  amitriptyline (ELAVIL) 50 MG tablet 90 tablet 1     Sig: Take 1 tablet by mouth nightly         Return in about 6 months (around 7/26/2022) for Check up.

## 2022-02-27 DIAGNOSIS — F41.9 ANXIETY AND DEPRESSION: ICD-10-CM

## 2022-02-27 DIAGNOSIS — F32.A ANXIETY AND DEPRESSION: ICD-10-CM

## 2022-02-28 RX ORDER — ALPRAZOLAM 0.5 MG/1
TABLET ORAL
Qty: 30 TABLET | Refills: 0 | OUTPATIENT
Start: 2022-02-28

## 2022-02-28 NOTE — TELEPHONE ENCOUNTER
Left message for patient to call the office.
Patient advised and verbalized understanding.
Patient returned phone call and she states she takes the medical marijuana PTSD, anxiety, depression, and pain.
Please ask her the reason she is using medical marijuana and find out what the diagnosis the prescription is written for. Thank you.
She can not take alprazolam with the medical marijuana. Thank you.
07/25/2022   Lipid Panel Once 07/25/2022   Microalbumin, Ur Once 07/25/2022       Next Visit Date:  Future Appointments   Date Time Provider Rohini Cabral   3/7/2022 10:30 AM [facility] MAMMOGRAPHY ROOM AT [facility]. [address] [facility] [address]   7/27/2022  1:40 PM Estrellita Johnson, APRN - CNP [facility]            Patient Active Problem List:     Dyslipidemia     Insomnia     Anxiety and depression     Adopted     Urinary urgency     Female stress incontinence     Controlled type 2 diabetes mellitus without complication, without long-term current use of insulin (HCC)     Nocturia     Frequency of urination     Mixed incontinence     Acute bronchitis with asthma

## 2022-03-23 ENCOUNTER — TELEPHONE (OUTPATIENT)
Dept: PRIMARY CARE CLINIC | Age: 37
End: 2022-03-23

## 2022-03-23 NOTE — TELEPHONE ENCOUNTER
Called patient re: diagnostic mammogram that was ordered last August to be done in February 2022. Patient was scheduled for 2/3/22 and 3/7/22. Phone number provided for patient to call to reschedule. Verbalizes understanding.

## 2022-04-02 ENCOUNTER — PATIENT MESSAGE (OUTPATIENT)
Dept: PRIMARY CARE CLINIC | Age: 37
End: 2022-04-02

## 2022-04-02 DIAGNOSIS — Z79.4 TYPE 2 DIABETES MELLITUS WITH OTHER SPECIFIED COMPLICATION, WITH LONG-TERM CURRENT USE OF INSULIN (HCC): ICD-10-CM

## 2022-04-02 DIAGNOSIS — E11.69 TYPE 2 DIABETES MELLITUS WITH OTHER SPECIFIED COMPLICATION, WITH LONG-TERM CURRENT USE OF INSULIN (HCC): ICD-10-CM

## 2022-04-04 ENCOUNTER — NURSE ONLY (OUTPATIENT)
Dept: PRIMARY CARE CLINIC | Age: 37
End: 2022-04-04
Payer: COMMERCIAL

## 2022-04-04 VITALS
SYSTOLIC BLOOD PRESSURE: 126 MMHG | HEART RATE: 77 BPM | RESPIRATION RATE: 18 BRPM | TEMPERATURE: 96.8 F | HEIGHT: 66 IN | OXYGEN SATURATION: 100 % | BODY MASS INDEX: 33.61 KG/M2 | DIASTOLIC BLOOD PRESSURE: 82 MMHG | WEIGHT: 209.1 LBS

## 2022-04-04 DIAGNOSIS — E11.9 CONTROLLED TYPE 2 DIABETES MELLITUS WITHOUT COMPLICATION, WITHOUT LONG-TERM CURRENT USE OF INSULIN (HCC): Primary | ICD-10-CM

## 2022-04-04 LAB — HBA1C MFR BLD: 5.8 %

## 2022-04-04 PROCEDURE — 83036 HEMOGLOBIN GLYCOSYLATED A1C: CPT | Performed by: NURSE PRACTITIONER

## 2022-04-04 RX ORDER — INSULIN GLARGINE 100 [IU]/ML
36 INJECTION, SOLUTION SUBCUTANEOUS NIGHTLY
Qty: 15 ML | Refills: 0 | Status: SHIPPED
Start: 2022-04-04 | End: 2022-04-04 | Stop reason: ALTCHOICE

## 2022-04-04 ASSESSMENT — PATIENT HEALTH QUESTIONNAIRE - PHQ9
5. POOR APPETITE OR OVEREATING: 3
10. IF YOU CHECKED OFF ANY PROBLEMS, HOW DIFFICULT HAVE THESE PROBLEMS MADE IT FOR YOU TO DO YOUR WORK, TAKE CARE OF THINGS AT HOME, OR GET ALONG WITH OTHER PEOPLE: 2
3. TROUBLE FALLING OR STAYING ASLEEP: 3
4. FEELING TIRED OR HAVING LITTLE ENERGY: 3
8. MOVING OR SPEAKING SO SLOWLY THAT OTHER PEOPLE COULD HAVE NOTICED. OR THE OPPOSITE, BEING SO FIGETY OR RESTLESS THAT YOU HAVE BEEN MOVING AROUND A LOT MORE THAN USUAL: 0
SUM OF ALL RESPONSES TO PHQ QUESTIONS 1-9: 17
1. LITTLE INTEREST OR PLEASURE IN DOING THINGS: 2
SUM OF ALL RESPONSES TO PHQ9 QUESTIONS 1 & 2: 4
6. FEELING BAD ABOUT YOURSELF - OR THAT YOU ARE A FAILURE OR HAVE LET YOURSELF OR YOUR FAMILY DOWN: 1
SUM OF ALL RESPONSES TO PHQ QUESTIONS 1-9: 16
SUM OF ALL RESPONSES TO PHQ QUESTIONS 1-9: 17
9. THOUGHTS THAT YOU WOULD BE BETTER OFF DEAD, OR OF HURTING YOURSELF: 1
2. FEELING DOWN, DEPRESSED OR HOPELESS: 2
7. TROUBLE CONCENTRATING ON THINGS, SUCH AS READING THE NEWSPAPER OR WATCHING TELEVISION: 2
SUM OF ALL RESPONSES TO PHQ QUESTIONS 1-9: 17

## 2022-04-04 ASSESSMENT — COLUMBIA-SUICIDE SEVERITY RATING SCALE - C-SSRS
1. WITHIN THE PAST MONTH, HAVE YOU WISHED YOU WERE DEAD OR WISHED YOU COULD GO TO SLEEP AND NOT WAKE UP?: YES
2. HAVE YOU ACTUALLY HAD ANY THOUGHTS OF KILLING YOURSELF?: NO
6. HAVE YOU EVER DONE ANYTHING, STARTED TO DO ANYTHING, OR PREPARED TO DO ANYTHING TO END YOUR LIFE?: NO

## 2022-04-04 NOTE — PATIENT INSTRUCTIONS
SURVEY:     You may be receiving a survey from [employer] regarding your visit today. Please complete the survey to enable us to provide the highest quality of care to you and your family. If you cannot score us a very good on any question, please call the office to discuss how we could have made your experience a very good one. Thank you.   Carolyn Johnson, APRN-LINN Watkins, CNP  Nikita Scott, LPN  Lissette Garcia, LPN  Tram, CMA  Shekhar Longoria, CMA  Nicki, CMA  Adriana, PCA

## 2022-04-04 NOTE — TELEPHONE ENCOUNTER
Decrease insulin to 36 units. Have her come in for A1c check. Call with readings in a few days. Thank you.

## 2022-04-04 NOTE — PROGRESS NOTES
We will stop insulin and psychological.  She will follow up with us in 3 months for routine visit, sooner if any issues.

## 2022-04-04 NOTE — TELEPHONE ENCOUNTER
From: Fina Torrez  To: Jostin Beal Might  Sent: 4/2/2022 6:15 PM EDT  Subject: Meds and sugars    This past week my sugars kept bottoming out and I kept getting sick. I was dizzy shaky Throwing up and my sugars were 60 to 125. I had to call off work because I was so lightheaded and afraid to pass out.  Is my Meds too much for me or how should I reschedule taking them for a third shift job because I dont want this to keep happening

## 2022-04-20 NOTE — TELEPHONE ENCOUNTER
Called patient and informed her that her labs were normal. Verbalizes understanding. Meds-to-Beds: Discharge prescription orders listed below delivered to patient's bedside. RN notified. Patient counseled. Patient elected to have co-payment paid with cash.    Current Outpatient Medications   Medication Sig Dispense Refill   • ibuprofen (MOTRIN) 800 MG Tab Take 1 Tablet by mouth every 8 hours as needed for up to 7 days. 30 Tablet 0      Jessica Starr, PharmD

## 2022-04-27 DIAGNOSIS — E55.9 VITAMIN D DEFICIENCY: ICD-10-CM

## 2022-04-27 DIAGNOSIS — F51.01 PRIMARY INSOMNIA: ICD-10-CM

## 2022-04-27 DIAGNOSIS — E11.9 CONTROLLED TYPE 2 DIABETES MELLITUS WITHOUT COMPLICATION, WITHOUT LONG-TERM CURRENT USE OF INSULIN (HCC): ICD-10-CM

## 2022-04-27 RX ORDER — LISINOPRIL 2.5 MG/1
TABLET ORAL
Qty: 90 TABLET | Refills: 0 | Status: SHIPPED | OUTPATIENT
Start: 2022-04-27 | End: 2022-08-02

## 2022-04-27 RX ORDER — AMITRIPTYLINE HYDROCHLORIDE 50 MG/1
50 TABLET, FILM COATED ORAL NIGHTLY
Qty: 90 TABLET | Refills: 0 | OUTPATIENT
Start: 2022-04-27

## 2022-04-27 NOTE — TELEPHONE ENCOUNTER
Health Maintenance   Topic Date Due    Pneumococcal 0-64 years Vaccine (2 - PCV) 06/02/2018    Diabetic retinal exam  03/29/2019    Diabetic foot exam  01/26/2023 (Originally 12/1/2021)    Hepatitis B vaccine (1 of 3 - Risk 3-dose series) 01/26/2023 (Originally 9/2/2006)    DTaP/Tdap/Td vaccine (2 - Tdap) 01/26/2023 (Originally 6/28/2015)    Cervical cancer screen  01/26/2023 (Originally 9/2/2008)    Diabetic microalbuminuria test  05/28/2022    Lipids  05/28/2022    Potassium  01/25/2023    Creatinine  01/25/2023    A1C test (Diabetic or Prediabetic)  04/04/2023    Depression Monitoring  04/04/2023    Flu vaccine  Completed    COVID-19 Vaccine  Completed    Hepatitis C screen  Completed    HIV screen  Completed    Hepatitis A vaccine  Aged Out    Hib vaccine  Aged Out    Meningococcal (ACWY) vaccine  Aged Out    Varicella vaccine  Discontinued             (applicable per patient's age: Cancer Screenings, Depression Screening, Fall Risk Screening, Immunizations)    Hemoglobin A1C (%)   Date Value   04/04/2022 5.8   01/25/2022 6.0   05/28/2021 8.0 (H)     Microalb/Crt.  Ratio (mcg/mg creat)   Date Value   05/28/2021 35 (H)     LDL Cholesterol (mg/dL)   Date Value   05/28/2021 59     AST (U/L)   Date Value   05/28/2021 12     ALT (U/L)   Date Value   05/28/2021 18     BUN (mg/dL)   Date Value   01/25/2022 17      (goal A1C is < 7)   (goal LDL is <100) need 30-50% reduction from baseline     BP Readings from Last 3 Encounters:   04/04/22 126/82   01/26/22 118/62   11/01/21 122/62    (goal /80)      All Future Testing planned in [facility]:  Lab Frequency Next Occurrence   EVANGELISTA CINDY DIGITAL DIAGNOSTIC UNILATERAL RIGHT Once 04/06/2022   CBC Auto Differential Once 07/25/2022   ALT Once 07/26/2022   AST Once 36/01/5137   Basic Metabolic Panel Once 43/07/2018   Hemoglobin A1C Once 07/25/2022   Lipid Panel Once 07/25/2022   Microalbumin, Ur Once 07/25/2022       Next Visit Date:  Future Appointments Date Time Provider Rohini Marianna   7/27/2022  1:40 PM Debbrah Kayser Might, APRN - CNP [facility]            Patient Active Problem List:     Dyslipidemia     Insomnia     Anxiety and depression     Adopted     Urinary urgency     Female stress incontinence     Controlled type 2 diabetes mellitus without complication, without long-term current use of insulin (HCC)     Nocturia     Frequency of urination     Mixed incontinence     Acute bronchitis with asthma

## 2022-05-26 DIAGNOSIS — E78.5 DYSLIPIDEMIA: ICD-10-CM

## 2022-05-26 DIAGNOSIS — F32.A ANXIETY AND DEPRESSION: ICD-10-CM

## 2022-05-26 DIAGNOSIS — J30.1 SEASONAL ALLERGIC RHINITIS DUE TO POLLEN: ICD-10-CM

## 2022-05-26 DIAGNOSIS — F41.9 ANXIETY AND DEPRESSION: ICD-10-CM

## 2022-05-26 RX ORDER — MONTELUKAST SODIUM 10 MG/1
TABLET ORAL
Qty: 90 TABLET | Refills: 1 | Status: SHIPPED | OUTPATIENT
Start: 2022-05-26 | End: 2022-10-27

## 2022-05-26 RX ORDER — BUSPIRONE HYDROCHLORIDE 15 MG/1
TABLET ORAL
Qty: 180 TABLET | Refills: 1 | Status: SHIPPED | OUTPATIENT
Start: 2022-05-26 | End: 2022-10-27

## 2022-05-26 RX ORDER — ATORVASTATIN CALCIUM 20 MG/1
TABLET, FILM COATED ORAL
Qty: 90 TABLET | Refills: 1 | Status: SHIPPED | OUTPATIENT
Start: 2022-05-26 | End: 2022-05-31

## 2022-05-26 NOTE — TELEPHONE ENCOUNTER
Health Maintenance   Topic Date Due    Pneumococcal 0-64 years Vaccine (2 - PCV) 06/02/2018    Diabetic retinal exam  03/29/2019    Diabetic microalbuminuria test  05/28/2022    Lipids  05/28/2022    Diabetic foot exam  01/26/2023 (Originally 12/1/2021)    Hepatitis B vaccine (1 of 3 - Risk 3-dose series) 01/26/2023 (Originally 9/2/2006)    DTaP/Tdap/Td vaccine (2 - Tdap) 01/26/2023 (Originally 6/28/2015)    Cervical cancer screen  01/26/2023 (Originally 9/2/2008)    A1C test (Diabetic or Prediabetic)  04/04/2023    Depression Monitoring  04/04/2023    Flu vaccine  Completed    COVID-19 Vaccine  Completed    Hepatitis C screen  Completed    HIV screen  Completed    Hepatitis A vaccine  Aged Out    Hib vaccine  Aged Out    Meningococcal (ACWY) vaccine  Aged Out    Varicella vaccine  Discontinued             (applicable per patient's age: Cancer Screenings, Depression Screening, Fall Risk Screening, Immunizations)    Hemoglobin A1C (%)   Date Value   04/04/2022 5.8   01/25/2022 6.0   05/28/2021 8.0 (H)     Microalb/Crt.  Ratio (mcg/mg creat)   Date Value   05/28/2021 35 (H)     LDL Cholesterol (mg/dL)   Date Value   05/28/2021 59     AST (U/L)   Date Value   05/28/2021 12     ALT (U/L)   Date Value   05/28/2021 18     BUN (mg/dL)   Date Value   01/25/2022 17      (goal A1C is < 7)   (goal LDL is <100) need 30-50% reduction from baseline     BP Readings from Last 3 Encounters:   04/04/22 126/82   01/26/22 118/62   11/01/21 122/62    (goal /80)      All Future Testing planned in [facility]:  Lab Frequency Next Occurrence   EVANGELISTA CINDY DIGITAL DIAGNOSTIC UNILATERAL RIGHT Once 04/06/2022   CBC Auto Differential Once 07/25/2022   ALT Once 07/26/2022   AST Once 64/86/4161   Basic Metabolic Panel Once 47/85/2735   Hemoglobin A1C Once 07/25/2022   Lipid Panel Once 07/25/2022   Microalbumin, Ur Once 07/25/2022       Next Visit Date:  Future Appointments   Date Time Provider Rohini Cabral   7/27/2022  1:40 ADELIA Johnson, APRN - CNP [facility]            Patient Active Problem List:     Dyslipidemia     Insomnia     Anxiety and depression     Adopted     Urinary urgency     Female stress incontinence     Controlled type 2 diabetes mellitus without complication, without long-term current use of insulin (HCC)     Nocturia     Frequency of urination     Mixed incontinence     Acute bronchitis with asthma

## 2022-05-30 DIAGNOSIS — E78.5 DYSLIPIDEMIA: ICD-10-CM

## 2022-05-31 RX ORDER — ATORVASTATIN CALCIUM 20 MG/1
TABLET, FILM COATED ORAL
Qty: 90 TABLET | Refills: 3 | Status: SHIPPED | OUTPATIENT
Start: 2022-05-31

## 2022-05-31 NOTE — TELEPHONE ENCOUNTER
Health Maintenance   Topic Date Due    Pneumococcal 0-64 years Vaccine (2 - PCV) 06/02/2018    Diabetic retinal exam  03/29/2019    Diabetic microalbuminuria test  05/28/2022    Lipids  05/28/2022    Diabetic foot exam  01/26/2023 (Originally 12/1/2021)    Hepatitis B vaccine (1 of 3 - Risk 3-dose series) 01/26/2023 (Originally 9/2/2006)    DTaP/Tdap/Td vaccine (2 - Tdap) 01/26/2023 (Originally 6/28/2015)    Cervical cancer screen  01/26/2023 (Originally 9/2/2008)    A1C test (Diabetic or Prediabetic)  04/04/2023    Depression Monitoring  04/04/2023    Flu vaccine  Completed    COVID-19 Vaccine  Completed    Hepatitis C screen  Completed    HIV screen  Completed    Hepatitis A vaccine  Aged Out    Hib vaccine  Aged Out    Meningococcal (ACWY) vaccine  Aged Out    Varicella vaccine  Discontinued             (applicable per patient's age: Cancer Screenings, Depression Screening, Fall Risk Screening, Immunizations)    Hemoglobin A1C (%)   Date Value   04/04/2022 5.8   01/25/2022 6.0   05/28/2021 8.0 (H)     Microalb/Crt.  Ratio (mcg/mg creat)   Date Value   05/28/2021 35 (H)     LDL Cholesterol (mg/dL)   Date Value   05/28/2021 59     AST (U/L)   Date Value   05/28/2021 12     ALT (U/L)   Date Value   05/28/2021 18     BUN (mg/dL)   Date Value   01/25/2022 17      (goal A1C is < 7)   (goal LDL is <100) need 30-50% reduction from baseline     BP Readings from Last 3 Encounters:   04/04/22 126/82   01/26/22 118/62   11/01/21 122/62    (goal /80)      All Future Testing planned in [facility]:  Lab Frequency Next Occurrence   EVANGELISTA CINDY DIGITAL DIAGNOSTIC UNILATERAL RIGHT Once 04/06/2022   CBC Auto Differential Once 07/25/2022   ALT Once 07/26/2022   AST Once 84/04/2878   Basic Metabolic Panel Once 36/96/6856   Hemoglobin A1C Once 07/25/2022   Lipid Panel Once 07/25/2022   Microalbumin, Ur Once 07/25/2022       Next Visit Date:  Future Appointments   Date Time Provider Rohini Cabral   7/27/2022  1:40 PM Adaljuan c Johnson, APRN - CNP [facility]            Patient Active Problem List:     Dyslipidemia     Insomnia     Anxiety and depression     Adopted     Urinary urgency     Female stress incontinence     Controlled type 2 diabetes mellitus without complication, without long-term current use of insulin (HCC)     Nocturia     Frequency of urination     Mixed incontinence     Acute bronchitis with asthma

## 2022-06-21 ENCOUNTER — OFFICE VISIT (OUTPATIENT)
Dept: PRIMARY CARE CLINIC | Age: 37
End: 2022-06-21
Payer: COMMERCIAL

## 2022-06-21 ENCOUNTER — HOSPITAL ENCOUNTER (OUTPATIENT)
Age: 37
Setting detail: SPECIMEN
Discharge: HOME | End: 2022-06-21
Payer: COMMERCIAL

## 2022-06-21 VITALS
SYSTOLIC BLOOD PRESSURE: 105 MMHG | RESPIRATION RATE: 18 BRPM | HEIGHT: 66 IN | DIASTOLIC BLOOD PRESSURE: 73 MMHG | TEMPERATURE: 97.8 F | OXYGEN SATURATION: 100 % | HEART RATE: 79 BPM | WEIGHT: 210.8 LBS | BODY MASS INDEX: 33.88 KG/M2

## 2022-06-21 DIAGNOSIS — N30.01 ACUTE CYSTITIS WITH HEMATURIA: Primary | ICD-10-CM

## 2022-06-21 DIAGNOSIS — N30.01 ACUTE CYSTITIS WITH HEMATURIA: ICD-10-CM

## 2022-06-21 LAB
BILIRUBIN, POC: ABNORMAL
BLOOD URINE, POC: ABNORMAL
CLARITY, POC: CLEAR
COLOR, POC: YELLOW
GLUCOSE URINE, POC: ABNORMAL
KETONES, POC: ABNORMAL
LEUKOCYTE EST, POC: ABNORMAL
NITRITE, POC: ABNORMAL
PH, POC: 5.5
PROTEIN, POC: ABNORMAL
SPECIFIC GRAVITY, POC: 1.03
UROBILINOGEN, POC: 0.2

## 2022-06-21 PROCEDURE — 87088 URINE BACTERIA CULTURE: CPT

## 2022-06-21 PROCEDURE — 81003 URINALYSIS AUTO W/O SCOPE: CPT | Performed by: NURSE PRACTITIONER

## 2022-06-21 PROCEDURE — 87186 SC STD MICRODIL/AGAR DIL: CPT

## 2022-06-21 PROCEDURE — G8417 CALC BMI ABV UP PARAM F/U: HCPCS | Performed by: NURSE PRACTITIONER

## 2022-06-21 PROCEDURE — G8427 DOCREV CUR MEDS BY ELIG CLIN: HCPCS | Performed by: NURSE PRACTITIONER

## 2022-06-21 PROCEDURE — 99214 OFFICE O/P EST MOD 30 MIN: CPT | Performed by: NURSE PRACTITIONER

## 2022-06-21 PROCEDURE — 4004F PT TOBACCO SCREEN RCVD TLK: CPT | Performed by: NURSE PRACTITIONER

## 2022-06-21 PROCEDURE — 87086 URINE CULTURE/COLONY COUNT: CPT

## 2022-06-21 RX ORDER — NITROFURANTOIN 25; 75 MG/1; MG/1
100 CAPSULE ORAL 2 TIMES DAILY
Qty: 10 CAPSULE | Refills: 0 | Status: SHIPPED | OUTPATIENT
Start: 2022-06-21 | End: 2022-06-26

## 2022-06-21 RX ORDER — PHENAZOPYRIDINE HYDROCHLORIDE 200 MG/1
200 TABLET, FILM COATED ORAL 3 TIMES DAILY PRN
Qty: 15 TABLET | Refills: 0 | Status: SHIPPED | OUTPATIENT
Start: 2022-06-21 | End: 2022-06-26

## 2022-06-21 ASSESSMENT — PATIENT HEALTH QUESTIONNAIRE - PHQ9
10. IF YOU CHECKED OFF ANY PROBLEMS, HOW DIFFICULT HAVE THESE PROBLEMS MADE IT FOR YOU TO DO YOUR WORK, TAKE CARE OF THINGS AT HOME, OR GET ALONG WITH OTHER PEOPLE: 0
SUM OF ALL RESPONSES TO PHQ QUESTIONS 1-9: 0
9. THOUGHTS THAT YOU WOULD BE BETTER OFF DEAD, OR OF HURTING YOURSELF: 0
3. TROUBLE FALLING OR STAYING ASLEEP: 0
SUM OF ALL RESPONSES TO PHQ9 QUESTIONS 1 & 2: 0
SUM OF ALL RESPONSES TO PHQ QUESTIONS 1-9: 0
5. POOR APPETITE OR OVEREATING: 0
7. TROUBLE CONCENTRATING ON THINGS, SUCH AS READING THE NEWSPAPER OR WATCHING TELEVISION: 0
1. LITTLE INTEREST OR PLEASURE IN DOING THINGS: 0
2. FEELING DOWN, DEPRESSED OR HOPELESS: 0
4. FEELING TIRED OR HAVING LITTLE ENERGY: 0
8. MOVING OR SPEAKING SO SLOWLY THAT OTHER PEOPLE COULD HAVE NOTICED. OR THE OPPOSITE, BEING SO FIGETY OR RESTLESS THAT YOU HAVE BEEN MOVING AROUND A LOT MORE THAN USUAL: 0
6. FEELING BAD ABOUT YOURSELF - OR THAT YOU ARE A FAILURE OR HAVE LET YOURSELF OR YOUR FAMILY DOWN: 0

## 2022-06-21 ASSESSMENT — ENCOUNTER SYMPTOMS
EYES NEGATIVE: 1
ALLERGIC/IMMUNOLOGIC NEGATIVE: 1
RESPIRATORY NEGATIVE: 1
GASTROINTESTINAL NEGATIVE: 1

## 2022-06-21 NOTE — PROGRESS NOTES
[facility]  Senia Hough, [address] [facility]  [address]  Dept: [phone]  Dept Fax: [phone]      Name: Clarice Steven  : 1985         Chief Complaint:     Chief Complaint   Patient presents with    Urinary Tract Infection     frequent urination        History of Present Illness:      Clarice Steven is a 31 y.o.  female who presents with Urinary Tract Infection (frequent urination )    Marlin Lerma is here today with dysuria and frequency. She describes the pain as burning. Denies a fever. She is also having some urgency. She has had hematuria. She has not taken any medication for the pain. She states mild right back pain. Past Medical History:     Past Medical History:   Diagnosis Date    Acute bronchitis with asthma 2020    Anxiety and depression     Depression     Female stress incontinence 2015    Obesity     Type II or unspecified type diabetes mellitus without mention of complication, not stated as uncontrolled     Wears glasses       Reviewed all health maintenance requirements and ordered appropriate tests  Health Maintenance Due   Topic Date Due    Diabetic retinal exam  2019    Diabetic microalbuminuria test  2022    Lipids  2022       Past Surgical History:     Past Surgical History:   Procedure Laterality Date    TUBAL LIGATION      WISDOM TOOTH EXTRACTION          Medications:       Prior to Admission medications    Medication Sig Start Date End Date Taking?  Authorizing Provider   atorvastatin (LIPITOR) 20 MG tablet Take 1 tablet by mouth once daily 22  Yes Jitendra Johnson APRN - CNP   busPIRone (BUSPAR) 15 MG tablet Take 1 tablet by mouth twice daily 22  Yes Jitendra Johnson APRN - CNP   montelukast (SINGULAIR) 10 MG tablet Take 1 tablet by mouth once daily 22  Yes Jitendra Johnson, APRN - CNP   Cholecalciferol (VITAMIN D3) 25 MCG (1000 UT) CAPS Take 2 capsules by mouth once daily 4/27/22  Yes Paige Backteresa Might, NINA Pack CNP   lisinopril (PRINIVIL;ZESTRIL) 2.5 MG tablet Take 1 tablet by mouth once daily 4/27/22  Yes Paige Backteresa Might, APRN - CNP   metFORMIN (GLUCOPHAGE) 1000 MG tablet TAKE 1 TABLET BY MOUTH TWICE DAILY WITH MEALS 4/27/22  Yes Paige Backteresa Might, NINA Pack CNP   NONFORMULARY Medical Marijuana Card   Yes Historical Provider, MD   amitriptyline (ELAVIL) 50 MG tablet Take 1 tablet by mouth nightly 1/26/22  Yes Paige Reyes Might, APRN - CNP   topiramate (TOPAMAX) 50 MG tablet Take 1 tablet by mouth nightly 1/3/22  Yes Paige Reyes MightNINA CNP   ondansetron (ZOFRAN) 4 MG tablet Take 1 tablet by mouth 3 times daily as needed for Nausea or Vomiting 1/3/22  Yes NINA Christensen CNP   Semaglutide (RYBELSUS) 14 MG TABS Take 14 mg by mouth daily 1/3/22  Yes Paige Reyes MightNINA CNP   venlafaxine (EFFEXOR XR) 75 MG extended release capsule Take 1 capsule by mouth once daily 7/23/21  Yes Paige Reyes Might, APRN - CNP   venlafaxine (EFFEXOR XR) 150 MG extended release capsule Take 1 capsule by mouth once daily 7/23/21  Yes Paige Reyes MightNINA CNP   glucose monitoring kit (FREESTYLE) monitoring kit 1 kit by Does not apply route daily Substitute as needed for insurance requirements. 5/7/21  Yes [de-identified] M NINA Minor CNP   Insulin Pen Needle (PEN NEEDLES) 31G X 6 MM MISC 1 each by Does not apply route daily 5/7/21  Yes NINA Brown CNP   blood glucose test strips (TRUE METRIX BLOOD GLUCOSE TEST) strip 1 each by In Vitro route daily As needed. 5/7/21  Yes [de-identified] M NINA Minor CNP   Lancets MISC Substitute as needed for insurance requirements. Dx 250.00, testing daily. 5/7/21 1/3/25 Yes [de-identified] M NINA Minor CNP   gabapentin (NEURONTIN) 100 MG capsule Take 1 capsule by mouth 3 times daily for 90 days. 1/3/22 4/3/22  NINA Connolly CNP        Allergies:       Patient has no known allergies. Social History:     Tobacco:    reports that she has been smoking cigarettes.  She has been Capillary refill takes less than 2 seconds. Neurological:      General: No focal deficit present. Mental Status: She is alert. Psychiatric:         Mood and Affect: Mood normal.         Data:     Lab Results   Component Value Date     01/25/2022    K 4.6 01/25/2022     01/25/2022    CO2 21 01/25/2022    BUN 17 01/25/2022    CREATININE 0.48 01/25/2022    GLUCOSE 98 01/25/2022    PROT 7.3 11/20/2019    LABALBU 3.9 11/20/2019    BILITOT <0.10 11/20/2019    ALKPHOS 86 11/20/2019    AST 12 05/28/2021    ALT 18 05/28/2021     Lab Results   Component Value Date    WBC 9.1 05/28/2021    RBC 4.49 05/28/2021    HGB 13.3 05/28/2021    HCT 42.4 05/28/2021    MCV 94.4 05/28/2021    MCH 29.6 05/28/2021    MCHC 31.4 05/28/2021    RDW 12.7 05/28/2021     05/28/2021    MPV 8.5 05/28/2021     Lab Results   Component Value Date    TSH 1.86 06/10/2021     Lab Results   Component Value Date    CHOL 133 05/28/2021    HDL 35 05/28/2021    LABA1C 5.8 04/04/2022       Assessment/Plan:      Diagnosis Orders   1. Frequent urination  POCT Urinalysis No Micro (Auto)     · Encouraged to increase fluids and to eat nutritiously. · Avoid full bladder, bubble baths, bath oils and hot tubs. · Wipe front to back   · Continue antibiotic as prescribed until all doses are completed  · Probiotic OTC or greek yogurt daily while on antibiotic  · Will call with urine culture results once obtained. · Patient instructions given for urinary tract infection. · To ER or call 911 if any difficulty breathing, shortness of breath, inability to swallow, hives, rash, facial/tongue swelling or temp greater than 103 degrees. Requested Prescriptions      No prescriptions requested or ordered in this encounter         No follow-ups on file.

## 2022-06-21 NOTE — PATIENT INSTRUCTIONS
SURVEY:     You may be receiving a survey from [employer] regarding your visit today. Please complete the survey to enable us to provide the highest quality of care to you and your family. If you cannot score us a very good on any question, please call the office to discuss how we could have made your experience a very good one. Thank you. Josi Johnson, APRN-LINN Medina, CNP  Omar Costa, LPN  Tong yKle, CMA  Jasson Gallardo, CMA  Nicki, CMA  Adriana, PCA  Shelby, PM    Patient Education        Urinary Tract Infection (UTI) in Women: Care Instructions  Overview     A urinary tract infection, or UTI, is a general term for an infection anywhere between the kidneys and the urethra (where urine comes out). Most UTIs arebladder infections. They often cause pain or burning when you urinate. UTIs are caused by bacteria and can be cured with antibiotics. Be sure tocomplete your treatment so that the infection does not get worse. Follow-up care is a key part of your treatment and safety. Be sure to make and go to all appointments, and call your doctor if you are having problems. It's also a good idea to know your test results and keep alist of the medicines you take. How can you care for yourself at home?  Take your antibiotics as directed. Do not stop taking them just because you feel better. You need to take the full course of antibiotics.  Drink extra water and other fluids for the next day or two. This will help make the urine less concentrated and help wash out the bacteria that are causing the infection. (If you have kidney, heart, or liver disease and have to limit fluids, talk with your doctor before you increase the amount of fluids you drink.)   Avoid drinks that are carbonated or have caffeine. They can irritate the bladder.  Urinate often. Try to empty your bladder each time.  To relieve pain, take a hot bath or lay a heating pad set on low over your lower belly or genital area.  Never go to sleep with a heating pad in place. To prevent UTIs   Drink plenty of water each day. This helps you urinate often, which clears bacteria from your system. (If you have kidney, heart, or liver disease and have to limit fluids, talk with your doctor before you increase the amount of fluids you drink.)   Urinate when you need to.  If you are sexually active, urinate right after you have sex.  Change sanitary pads often.  Avoid douches, bubble baths, feminine hygiene sprays, and other feminine hygiene products that have deodorants.  After going to the bathroom, wipe from front to back. When should you call for help? Call your doctor now or seek immediate medical care if:     Symptoms such as fever, chills, nausea, or vomiting get worse or appear for the first time.      You have new pain in your back just below your rib cage. This is called flank pain.      There is new blood or pus in your urine.      You have any problems with your antibiotic medicine. Watch closely for changes in your health, and be sure to contact your doctor if:     You are not getting better after taking an antibiotic for 2 days.      Your symptoms go away but then come back. Where can you learn more? Go to [email or web address] and sign in to your [employer] account. Enter B794 in the [facility] box to learn more about \"Urinary Tract Infection (UTI) in Women: Care Instructions. \"     If you do not have an account, please click on the \"Sign Up Now\" link. Current as of: October 18, 2021               Content Version: 13.3  © 2006-2022 [employer]. Care instructions adapted under license by [employer]. If you have questions about a medical condition or this instruction, always ask your healthcare professional. [place] [address] any warranty or liability for your use of this information.

## 2022-06-23 ENCOUNTER — TELEPHONE (OUTPATIENT)
Dept: PRIMARY CARE CLINIC | Age: 37
End: 2022-06-23

## 2022-06-23 LAB
CULTURE: ABNORMAL
SPECIMEN DESCRIPTION: ABNORMAL

## 2022-06-23 RX ORDER — CEPHALEXIN 500 MG/1
500 CAPSULE ORAL 2 TIMES DAILY
Qty: 20 CAPSULE | Refills: 0 | Status: SHIPPED | OUTPATIENT
Start: 2022-06-23 | End: 2022-07-03

## 2022-06-23 NOTE — TELEPHONE ENCOUNTER
Please call and ask her if she can come to office today for a Rocephin injection? If not I will prescribe another oral antibiotic for her.

## 2022-06-23 NOTE — TELEPHONE ENCOUNTER
Patient states she is feeling a little bit better but is still having frequency with her urination and not as much output.

## 2022-06-23 NOTE — TELEPHONE ENCOUNTER
----- Message from NINA Hauser CNP sent at 6/23/2022 10:30 AM EDT -----  Please notify patient of urine culture results. Ask her if she is feeling better? If not I will prescribe another antibiotic.   Thanks Jennifer's

## 2022-07-05 DIAGNOSIS — G62.9 POLYNEUROPATHY: ICD-10-CM

## 2022-07-05 RX ORDER — GABAPENTIN 100 MG/1
CAPSULE ORAL
Qty: 90 CAPSULE | Refills: 2 | Status: SHIPPED | OUTPATIENT
Start: 2022-07-05 | End: 2022-07-27 | Stop reason: SDUPTHER

## 2022-07-05 RX ORDER — ONDANSETRON 4 MG/1
TABLET, FILM COATED ORAL
Qty: 15 TABLET | Refills: 0 | Status: SHIPPED | OUTPATIENT
Start: 2022-07-05

## 2022-07-05 RX ORDER — ORAL SEMAGLUTIDE 14 MG/1
TABLET ORAL
Qty: 90 TABLET | Refills: 1 | Status: SHIPPED | OUTPATIENT
Start: 2022-07-05

## 2022-07-05 NOTE — TELEPHONE ENCOUNTER
Health Maintenance   Topic Date Due    Diabetic retinal exam  03/29/2019    Diabetic microalbuminuria test  05/28/2022    Lipids  05/28/2022    Diabetic foot exam  01/26/2023 (Originally 12/1/2021)    Hepatitis B vaccine (1 of 3 - Risk 3-dose series) 01/26/2023 (Originally 9/2/2006)    DTaP/Tdap/Td vaccine (2 - Tdap) 01/26/2023 (Originally 6/28/2015)    Cervical cancer screen  01/26/2023 (Originally 9/2/2008)    Pneumococcal 0-64 years Vaccine (2 - PCV) 06/21/2023 (Originally 6/2/2018)    Flu vaccine (1) 09/01/2022    A1C test (Diabetic or Prediabetic)  04/04/2023    Depression Monitoring  06/21/2023    COVID-19 Vaccine  Completed    Hepatitis C screen  Completed    HIV screen  Completed    Hepatitis A vaccine  Aged Out    Hib vaccine  Aged Out    Meningococcal (ACWY) vaccine  Aged Out    Varicella vaccine  Discontinued             (applicable per patient's age: Cancer Screenings, Depression Screening, Fall Risk Screening, Immunizations)    Hemoglobin A1C (%)   Date Value   04/04/2022 5.8   01/25/2022 6.0   05/28/2021 8.0 (H)     Microalb/Crt.  Ratio (mcg/mg creat)   Date Value   05/28/2021 35 (H)     LDL Cholesterol (mg/dL)   Date Value   05/28/2021 59     AST (U/L)   Date Value   05/28/2021 12     ALT (U/L)   Date Value   05/28/2021 18     BUN (mg/dL)   Date Value   01/25/2022 17      (goal A1C is < 7)   (goal LDL is <100) need 30-50% reduction from baseline     BP Readings from Last 3 Encounters:   06/21/22 105/73   04/04/22 126/82   01/26/22 118/62    (goal /80)      All Future Testing planned in [facility]:  Lab Frequency Next Occurrence   EVANGELISTA CINDY DIGITAL DIAGNOSTIC UNILATERAL RIGHT Once 04/06/2022   CBC Auto Differential Once 07/25/2022   ALT Once 07/26/2022   AST Once 00/15/8866   Basic Metabolic Panel Once 40/96/2307   Hemoglobin A1C Once 07/25/2022   Lipid Panel Once 07/25/2022   Microalbumin, Ur Once 07/25/2022       Next Visit Date:  Future Appointments   Date Time Provider Rohini Cabral   7/27/2022  1:40 PM Funmilayo Johnson, APRN - CNP [facility]            Patient Active Problem List:     Dyslipidemia     Insomnia     Anxiety and depression     Adopted     Urinary urgency     Female stress incontinence     Controlled type 2 diabetes mellitus without complication, without long-term current use of insulin (HCC)     Nocturia     Frequency of urination     Mixed incontinence     Acute bronchitis with asthma

## 2022-07-10 DIAGNOSIS — G43.709 CHRONIC MIGRAINE WITHOUT AURA WITHOUT STATUS MIGRAINOSUS, NOT INTRACTABLE: ICD-10-CM

## 2022-07-11 RX ORDER — TOPIRAMATE 50 MG/1
50 TABLET, FILM COATED ORAL NIGHTLY
Qty: 90 TABLET | Refills: 1 | Status: SHIPPED | OUTPATIENT
Start: 2022-07-11 | End: 2022-07-27 | Stop reason: ALTCHOICE

## 2022-07-11 NOTE — TELEPHONE ENCOUNTER
Health Maintenance   Topic Date Due    Diabetic retinal exam  03/29/2019    Diabetic microalbuminuria test  05/28/2022    Lipids  05/28/2022    Diabetic foot exam  01/26/2023 (Originally 12/1/2021)    Hepatitis B vaccine (1 of 3 - Risk 3-dose series) 01/26/2023 (Originally 9/2/2006)    DTaP/Tdap/Td vaccine (2 - Tdap) 01/26/2023 (Originally 6/28/2015)    Cervical cancer screen  01/26/2023 (Originally 9/2/2008)    Pneumococcal 0-64 years Vaccine (2 - PCV) 06/21/2023 (Originally 6/2/2018)    Flu vaccine (1) 09/01/2022    A1C test (Diabetic or Prediabetic)  04/04/2023    Depression Monitoring  06/21/2023    COVID-19 Vaccine  Completed    Hepatitis C screen  Completed    HIV screen  Completed    Hepatitis A vaccine  Aged Out    Hib vaccine  Aged Out    Meningococcal (ACWY) vaccine  Aged Out    Varicella vaccine  Discontinued             (applicable per patient's age: Cancer Screenings, Depression Screening, Fall Risk Screening, Immunizations)    Hemoglobin A1C (%)   Date Value   04/04/2022 5.8   01/25/2022 6.0   05/28/2021 8.0 (H)     Microalb/Crt.  Ratio (mcg/mg creat)   Date Value   05/28/2021 35 (H)     LDL Cholesterol (mg/dL)   Date Value   05/28/2021 59     AST (U/L)   Date Value   05/28/2021 12     ALT (U/L)   Date Value   05/28/2021 18     BUN (mg/dL)   Date Value   01/25/2022 17      (goal A1C is < 7)   (goal LDL is <100) need 30-50% reduction from baseline     BP Readings from Last 3 Encounters:   06/21/22 105/73   04/04/22 126/82   01/26/22 118/62    (goal /80)      All Future Testing planned in [facility]:  Lab Frequency Next Occurrence   EVANGELISTA CINDY DIGITAL DIAGNOSTIC UNILATERAL RIGHT Once 04/06/2022   CBC Auto Differential Once 07/25/2022   ALT Once 07/26/2022   AST Once 31/52/5564   Basic Metabolic Panel Once 64/45/2363   Hemoglobin A1C Once 07/25/2022   Lipid Panel Once 07/25/2022   Microalbumin, Ur Once 07/25/2022       Next Visit Date:  Future Appointments   Date Time Provider Rohini Cabral   7/27/2022  1:40 PM Izaiah Johnson, APRN - CNP [facility]            Patient Active Problem List:     Dyslipidemia     Insomnia     Anxiety and depression     Adopted     Urinary urgency     Female stress incontinence     Controlled type 2 diabetes mellitus without complication, without long-term current use of insulin (HCC)     Nocturia     Frequency of urination     Mixed incontinence     Acute bronchitis with asthma

## 2022-07-12 ENCOUNTER — PATIENT MESSAGE (OUTPATIENT)
Dept: PRIMARY CARE CLINIC | Age: 37
End: 2022-07-12

## 2022-07-12 NOTE — TELEPHONE ENCOUNTER
I am not sure how she gets the dog, but we can provide a letter stating she would benefit from an emotional support animal.

## 2022-07-12 NOTE — TELEPHONE ENCOUNTER
From: Tevin Thompson  To: Beto Caroldianne Johnson  Sent: 7/12/2022 9:13 AM EDT  Subject: Support animal     How do I go about getting an emotional therapy puppy for all the loss I have been through this past year.

## 2022-07-13 NOTE — TELEPHONE ENCOUNTER
Letter printed. Called and let patient know she can pick it up a the office at her earliest convenience. She voiced understanding.

## 2022-07-20 ENCOUNTER — TELEPHONE (OUTPATIENT)
Dept: PRIMARY CARE CLINIC | Age: 37
End: 2022-07-20

## 2022-07-20 NOTE — TELEPHONE ENCOUNTER
Called to remind patient of open lab work needing done before appointment next week. She voiced understanding.

## 2022-07-26 ENCOUNTER — HOSPITAL ENCOUNTER (OUTPATIENT)
Age: 37
Discharge: HOME | End: 2022-07-26
Payer: COMMERCIAL

## 2022-07-26 DIAGNOSIS — E11.9 CONTROLLED TYPE 2 DIABETES MELLITUS WITHOUT COMPLICATION, WITHOUT LONG-TERM CURRENT USE OF INSULIN (HCC): ICD-10-CM

## 2022-07-26 LAB
ABSOLUTE EOS #: 0.37 K/UL (ref 0–0.44)
ABSOLUTE IMMATURE GRANULOCYTE: 0.05 K/UL (ref 0–0.3)
ABSOLUTE LYMPH #: 3.28 K/UL (ref 1.1–3.7)
ABSOLUTE MONO #: 0.62 K/UL (ref 0.1–1.2)
ALT SERPL-CCNC: 24 U/L (ref 5–33)
ANION GAP SERPL CALCULATED.3IONS-SCNC: 11 MMOL/L (ref 9–17)
AST SERPL-CCNC: 18 U/L
BASOPHILS # BLD: 1 % (ref 0–2)
BASOPHILS ABSOLUTE: 0.1 K/UL (ref 0–0.2)
BUN BLDV-MCNC: 15 MG/DL (ref 6–20)
BUN/CREAT BLD: 25 (ref 9–20)
CALCIUM SERPL-MCNC: 9.6 MG/DL (ref 8.6–10.4)
CHLORIDE BLD-SCNC: 102 MMOL/L (ref 98–107)
CHOLESTEROL/HDL RATIO: 2.9
CHOLESTEROL: 154 MG/DL
CO2: 26 MMOL/L (ref 20–31)
CREAT SERPL-MCNC: 0.6 MG/DL (ref 0.5–0.9)
CREATININE URINE: 219.3 MG/DL (ref 28–217)
EOSINOPHILS RELATIVE PERCENT: 3 % (ref 1–4)
GFR AFRICAN AMERICAN: >60 ML/MIN
GFR NON-AFRICAN AMERICAN: >60 ML/MIN
GFR SERPL CREATININE-BSD FRML MDRD: ABNORMAL ML/MIN/{1.73_M2}
GFR SERPL CREATININE-BSD FRML MDRD: ABNORMAL ML/MIN/{1.73_M2}
GLUCOSE BLD-MCNC: 110 MG/DL (ref 70–99)
HCT VFR BLD CALC: 39.8 % (ref 36.3–47.1)
HDLC SERPL-MCNC: 53 MG/DL
HEMOGLOBIN: 12.6 G/DL (ref 11.9–15.1)
IMMATURE GRANULOCYTES: 1 %
LDL CHOLESTEROL: 76 MG/DL (ref 0–130)
LYMPHOCYTES # BLD: 30 % (ref 24–43)
MCH RBC QN AUTO: 30.8 PG (ref 25.2–33.5)
MCHC RBC AUTO-ENTMCNC: 31.7 G/DL (ref 28.4–34.8)
MCV RBC AUTO: 97.3 FL (ref 82.6–102.9)
MICROALBUMIN/CREAT 24H UR: 25 MG/L
MICROALBUMIN/CREAT UR-RTO: 11 MCG/MG CREAT
MONOCYTES # BLD: 6 % (ref 3–12)
NRBC AUTOMATED: 0 PER 100 WBC
PDW BLD-RTO: 12.7 % (ref 11.8–14.4)
PLATELET # BLD: 467 K/UL (ref 138–453)
PMV BLD AUTO: 8.5 FL (ref 8.1–13.5)
POTASSIUM SERPL-SCNC: 4.5 MMOL/L (ref 3.7–5.3)
RBC # BLD: 4.09 M/UL (ref 3.95–5.11)
SEG NEUTROPHILS: 59 % (ref 36–65)
SEGMENTED NEUTROPHILS ABSOLUTE COUNT: 6.62 K/UL (ref 1.5–8.1)
SODIUM BLD-SCNC: 139 MMOL/L (ref 135–144)
TRIGL SERPL-MCNC: 125 MG/DL
WBC # BLD: 11 K/UL (ref 3.5–11.3)

## 2022-07-26 PROCEDURE — 82043 UR ALBUMIN QUANTITATIVE: CPT

## 2022-07-26 PROCEDURE — 80048 BASIC METABOLIC PNL TOTAL CA: CPT

## 2022-07-26 PROCEDURE — 80061 LIPID PANEL: CPT

## 2022-07-26 PROCEDURE — 82570 ASSAY OF URINE CREATININE: CPT

## 2022-07-26 PROCEDURE — 36415 COLL VENOUS BLD VENIPUNCTURE: CPT

## 2022-07-26 PROCEDURE — 85025 COMPLETE CBC W/AUTO DIFF WBC: CPT

## 2022-07-26 PROCEDURE — 84460 ALANINE AMINO (ALT) (SGPT): CPT

## 2022-07-26 PROCEDURE — 84450 TRANSFERASE (AST) (SGOT): CPT

## 2022-07-26 PROCEDURE — 83036 HEMOGLOBIN GLYCOSYLATED A1C: CPT

## 2022-07-27 ENCOUNTER — OFFICE VISIT (OUTPATIENT)
Dept: PRIMARY CARE CLINIC | Age: 37
End: 2022-07-27
Payer: COMMERCIAL

## 2022-07-27 VITALS
WEIGHT: 214.7 LBS | HEART RATE: 84 BPM | DIASTOLIC BLOOD PRESSURE: 74 MMHG | OXYGEN SATURATION: 98 % | RESPIRATION RATE: 20 BRPM | SYSTOLIC BLOOD PRESSURE: 128 MMHG | TEMPERATURE: 97.7 F | BODY MASS INDEX: 34.65 KG/M2

## 2022-07-27 DIAGNOSIS — Z13.31 POSITIVE DEPRESSION SCREENING: ICD-10-CM

## 2022-07-27 DIAGNOSIS — E11.42 CONTROLLED TYPE 2 DIABETES MELLITUS WITH DIABETIC POLYNEUROPATHY, WITHOUT LONG-TERM CURRENT USE OF INSULIN (HCC): Primary | ICD-10-CM

## 2022-07-27 DIAGNOSIS — F41.9 ANXIETY AND DEPRESSION: ICD-10-CM

## 2022-07-27 DIAGNOSIS — F32.A ANXIETY AND DEPRESSION: ICD-10-CM

## 2022-07-27 DIAGNOSIS — E78.5 DYSLIPIDEMIA: ICD-10-CM

## 2022-07-27 LAB
ESTIMATED AVERAGE GLUCOSE: 134 MG/DL
HBA1C MFR BLD: 6.3 % (ref 4–6)

## 2022-07-27 PROCEDURE — 3044F HG A1C LEVEL LT 7.0%: CPT | Performed by: NURSE PRACTITIONER

## 2022-07-27 PROCEDURE — 99214 OFFICE O/P EST MOD 30 MIN: CPT | Performed by: NURSE PRACTITIONER

## 2022-07-27 RX ORDER — VENLAFAXINE HYDROCHLORIDE 150 MG/1
CAPSULE, EXTENDED RELEASE ORAL
Qty: 90 CAPSULE | Refills: 3 | Status: SHIPPED | OUTPATIENT
Start: 2022-07-27

## 2022-07-27 RX ORDER — GABAPENTIN 300 MG/1
CAPSULE ORAL
Qty: 180 CAPSULE | Refills: 0 | Status: SHIPPED | OUTPATIENT
Start: 2022-07-27 | End: 2022-10-27

## 2022-07-27 RX ORDER — VENLAFAXINE HYDROCHLORIDE 75 MG/1
CAPSULE, EXTENDED RELEASE ORAL
Qty: 90 CAPSULE | Refills: 3 | Status: SHIPPED | OUTPATIENT
Start: 2022-07-27

## 2022-07-27 ASSESSMENT — PATIENT HEALTH QUESTIONNAIRE - PHQ9
9. THOUGHTS THAT YOU WOULD BE BETTER OFF DEAD, OR OF HURTING YOURSELF: 0
2. FEELING DOWN, DEPRESSED OR HOPELESS: 1
SUM OF ALL RESPONSES TO PHQ QUESTIONS 1-9: 14
5. POOR APPETITE OR OVEREATING: 3
SUM OF ALL RESPONSES TO PHQ QUESTIONS 1-9: 14
SUM OF ALL RESPONSES TO PHQ QUESTIONS 1-9: 14
8. MOVING OR SPEAKING SO SLOWLY THAT OTHER PEOPLE COULD HAVE NOTICED. OR THE OPPOSITE, BEING SO FIGETY OR RESTLESS THAT YOU HAVE BEEN MOVING AROUND A LOT MORE THAN USUAL: 1
7. TROUBLE CONCENTRATING ON THINGS, SUCH AS READING THE NEWSPAPER OR WATCHING TELEVISION: 1
10. IF YOU CHECKED OFF ANY PROBLEMS, HOW DIFFICULT HAVE THESE PROBLEMS MADE IT FOR YOU TO DO YOUR WORK, TAKE CARE OF THINGS AT HOME, OR GET ALONG WITH OTHER PEOPLE: 0
3. TROUBLE FALLING OR STAYING ASLEEP: 3
6. FEELING BAD ABOUT YOURSELF - OR THAT YOU ARE A FAILURE OR HAVE LET YOURSELF OR YOUR FAMILY DOWN: 1
1. LITTLE INTEREST OR PLEASURE IN DOING THINGS: 1
4. FEELING TIRED OR HAVING LITTLE ENERGY: 3
SUM OF ALL RESPONSES TO PHQ9 QUESTIONS 1 & 2: 2
SUM OF ALL RESPONSES TO PHQ QUESTIONS 1-9: 14

## 2022-07-27 ASSESSMENT — ENCOUNTER SYMPTOMS
BACK PAIN: 0
SORE THROAT: 0
CONSTIPATION: 0
DIARRHEA: 0
VISUAL CHANGE: 0
COUGH: 0
ABDOMINAL PAIN: 0
WHEEZING: 0
RHINORRHEA: 0
SHORTNESS OF BREATH: 0
VOMITING: 0
NAUSEA: 0
TROUBLE SWALLOWING: 0

## 2022-07-27 ASSESSMENT — COLUMBIA-SUICIDE SEVERITY RATING SCALE - C-SSRS
1. WITHIN THE PAST MONTH, HAVE YOU WISHED YOU WERE DEAD OR WISHED YOU COULD GO TO SLEEP AND NOT WAKE UP?: NO
6. HAVE YOU EVER DONE ANYTHING, STARTED TO DO ANYTHING, OR PREPARED TO DO ANYTHING TO END YOUR LIFE?: NO
2. HAVE YOU ACTUALLY HAD ANY THOUGHTS OF KILLING YOURSELF?: NO

## 2022-07-27 NOTE — PROGRESS NOTES
Name: Genny Batista  : 1985         Chief Complaint:     Chief Complaint   Patient presents with    Diabetes     Routine check. No concerns. Review labs    Depression     Due to multiple family deaths. Would like to discuss medication adjustment       History of Present Illness:      Genny Batista is a 31 y.o.  female who presents with Diabetes (Routine check. No concerns. Review labs) and Depression (Due to multiple family deaths. Would like to discuss medication adjustment)      Scarlet Gilford is here today for a routine office visit. Anxiety and depression-worsening, patient states she is still dealing with a lot of drug issues in her family. Her sister also recently passed away right after Mother's Day from lymphoma that spread to her brain. See below for further comment. Diabetes  She presents for her follow-up diabetic visit. She has type 2 diabetes mellitus. Onset time: YEARS. Her disease course has been stable. Hypoglycemia symptoms include nervousness/anxiousness. Pertinent negatives for hypoglycemia include no confusion, dizziness, headaches, pallor, seizures, sleepiness or speech difficulty. Pertinent negatives for diabetes include no chest pain, no fatigue, no foot paresthesias, no polydipsia, no polyphagia, no polyuria, no visual change and no weakness. There are no hypoglycemic complications. Pertinent negatives for hypoglycemia complications include no blackouts, no required assistance and no required glucagon injection. Symptoms are stable. There are no diabetic complications. Pertinent negatives for diabetic complications include no CVA, heart disease, nephropathy or peripheral neuropathy. Risk factors for coronary artery disease include diabetes mellitus, dyslipidemia, family history, obesity, sedentary lifestyle and stress. Current diabetic treatment includes insulin injections and oral agent (triple therapy). She is compliant with treatment all of the time.  Her weight is decreasing steadily. She is following a generally healthy diet. Meal planning includes avoidance of concentrated sweets. She has had a previous visit with a dietitian. She never participates in exercise. Her home blood glucose trend is decreasing steadily. Her breakfast blood glucose range is generally 130-140 mg/dl. An ACE inhibitor/angiotensin II receptor blocker is being taken. She does not see a podiatrist.Eye exam is current. Hyperlipidemia  This is a chronic problem. The current episode started more than 1 year ago. The problem is controlled. Recent lipid tests were reviewed and are normal. Exacerbating diseases include diabetes and obesity. She has no history of hypothyroidism or liver disease. Factors aggravating her hyperlipidemia include fatty foods and smoking. Pertinent negatives include no chest pain, focal sensory loss, focal weakness or shortness of breath. Current antihyperlipidemic treatment includes statins. The current treatment provides moderate improvement of lipids. Compliance problems include adherence to exercise and adherence to diet. Risk factors for coronary artery disease include diabetes mellitus, dyslipidemia, obesity and stress. Mental Health Problem  The primary symptoms do not include dysphoric mood, delusions, hallucinations, bizarre behavior, disorganized speech, negative symptoms or somatic symptoms. Primary symptoms comment: NERVOUSNESS. The current episode started more than 1 month ago. This is a chronic problem. The onset of the illness is precipitated by emotional stress. The degree of incapacity that she is experiencing as a consequence of her illness is moderate. Sequelae of the illness include harmed interpersonal relations. Additional symptoms of the illness include anhedonia, insomnia, attention impairment, flight of ideas and distractible.  Additional symptoms of the illness do not include hypersomnia, appetite change, unexpected weight change, fatigue, agitation, psychomotor retardation, feelings of worthlessness, euphoric mood, increased goal-directed activity, inflated self-esteem, decreased need for sleep, poor judgment, visual change, headaches, abdominal pain or seizures. She does not admit to suicidal ideas. She does not have a plan to attempt suicide. She does not contemplate harming herself. She has not already injured self. She does not contemplate injuring another person. She has not already  injured another person. Risk factors that are present for mental illness include substance abuse. Past Medical History:     Past Medical History:   Diagnosis Date    Acute bronchitis with asthma 6/19/2020    Anxiety and depression     Depression     Female stress incontinence 11/23/2015    Obesity     Type II or unspecified type diabetes mellitus without mention of complication, not stated as uncontrolled     Wears glasses       Reviewed all health maintenance requirements and ordered appropriate tests  There are no preventive care reminders to display for this patient. Past Surgical History:     Past Surgical History:   Procedure Laterality Date    TUBAL LIGATION      WISDOM TOOTH EXTRACTION          Medications:       Prior to Admission medications    Medication Sig Start Date End Date Taking?  Authorizing Provider   venlafaxine (EFFEXOR XR) 75 MG extended release capsule Take 1 capsule by mouth once daily 7/27/22  Yes Andrea Johnson APRN - CNP   venlafaxine (EFFEXOR XR) 150 MG extended release capsule Take 1 capsule by mouth once daily 7/27/22  Yes NINA Brooks - CNP   gabapentin (NEURONTIN) 300 MG capsule TAKE 1 CAPSULE BY MOUTH TWO TIMES DAILY FOR 90 DAYS 7/27/22 10/27/22 Yes Silvio Johnson APRN - CNP   RYBELSUS 14 MG TABS Take 1 tablet by mouth once daily 7/5/22  Yes Andrea Johnson APRN - CNP   ondansetron (ZOFRAN) 4 MG tablet TAKE 1 TABLET BY MOUTH THREE TIMES DAILY AS NEEDED FOR NAUSEA FOR VOMITING 7/5/22  Yes Andrea Johnson, APRN - LINN   atorvastatin (LIPITOR) 20 MG tablet Take 1 tablet by mouth once daily 5/31/22  Yes Hill Common Might, NINA Pack CNP   busPIRone (BUSPAR) 15 MG tablet Take 1 tablet by mouth twice daily 5/26/22  Yes Hill Common Might, APRN - CNP   montelukast (SINGULAIR) 10 MG tablet Take 1 tablet by mouth once daily 5/26/22  Yes [place] Might, NINA Pack CNP   Cholecalciferol (VITAMIN D3) 25 MCG (1000 UT) CAPS Take 2 capsules by mouth once daily 4/27/22  Yes Hill Common Might, NINA Pack CNP   lisinopril (PRINIVIL;ZESTRIL) 2.5 MG tablet Take 1 tablet by mouth once daily 4/27/22  Yes Hill Common Might, NINA Pack CNP   metFORMIN (GLUCOPHAGE) 1000 MG tablet TAKE 1 TABLET BY MOUTH TWICE DAILY WITH MEALS 4/27/22  Yes Hill Common Might, NINA Pack CNP   NONFORMULARY Medical Marijuana Card   Yes Historical Provider, MD   amitriptyline (ELAVIL) 50 MG tablet Take 1 tablet by mouth nightly 1/26/22  Yes Hill Common Might, NINA Pack CNP   glucose monitoring kit (FREESTYLE) monitoring kit 1 kit by Does not apply route daily Substitute as needed for insurance requirements. 5/7/21  Yes [de-identified] M NINA Minor CNP   Insulin Pen Needle (PEN NEEDLES) 31G X 6 MM MISC 1 each by Does not apply route daily 5/7/21  Yes NINA Brown CNP   blood glucose test strips (TRUE METRIX BLOOD GLUCOSE TEST) strip 1 each by In Vitro route daily As needed. 5/7/21  Yes [de-identified] M NINA Minor CNP   Lancets MISC Substitute as needed for insurance requirements. Dx 250.00, testing daily. 5/7/21 1/3/25 Yes [de-identified] M NINA Minor CNP        Allergies:       Patient has no known allergies. Social History:     Tobacco:    reports that she has been smoking cigarettes. She has been smoking an average of .25 packs per day. She has never used smokeless tobacco.  Alcohol:      reports that she does not currently use alcohol. Drug Use:  reports current drug use. Drug: Marijuana Rosemary Cocrhan).     Family History:     Family History   Adopted: Yes   Problem Relation Age of Onset    Other Mother         OVER Dose    Other Father         Over dose       Review of Systems:     Positive and Negative as described in HPI    Review of Systems   Constitutional:  Negative for appetite change, chills, fatigue, fever and unexpected weight change. HENT:  Negative for congestion, rhinorrhea, sneezing, sore throat and trouble swallowing. Eyes:  Negative for visual disturbance. Respiratory:  Negative for cough, shortness of breath and wheezing. Cardiovascular:  Negative for chest pain and palpitations. Gastrointestinal:  Negative for abdominal pain, constipation, diarrhea, nausea and vomiting. Endocrine: Negative for polydipsia, polyphagia and polyuria. Genitourinary:  Negative for difficulty urinating and dysuria. Musculoskeletal:  Negative for back pain, gait problem, neck pain and neck stiffness. Skin:  Negative for pallor and rash. Neurological:  Negative for dizziness, focal weakness, seizures, syncope, speech difficulty, weakness, light-headedness and headaches. Psychiatric/Behavioral:  Positive for sleep disturbance. Negative for agitation, behavioral problems, confusion, decreased concentration, dysphoric mood, hallucinations, self-injury and suicidal ideas. The patient is nervous/anxious and has insomnia. The patient is not hyperactive. Physical Exam:   Vitals:  /74 (Position: Sitting)   Pulse 84   Temp 97.7 °F (36.5 °C) (Temporal)   Resp 20   Wt 214 lb 11.2 oz (97.4 kg)   SpO2 98%   BMI 34.65 kg/m²     Physical Exam  Vitals and nursing note reviewed. Constitutional:       General: She is not in acute distress. Appearance: Normal appearance. She is well-developed. She is obese. She is not ill-appearing. HENT:      Mouth/Throat:      Mouth: Mucous membranes are moist. Mucous membranes are not dry. Eyes:      Conjunctiva/sclera: Conjunctivae normal.   Cardiovascular:      Rate and Rhythm: Normal rate and regular rhythm. Heart sounds: Normal heart sounds.    Pulmonary:      Effort: Pulmonary effort is normal.      Breath sounds: Normal breath sounds. No wheezing. Abdominal:      General: Bowel sounds are normal. There is no distension. Palpations: Abdomen is soft. Tenderness: There is no abdominal tenderness. Musculoskeletal:      Cervical back: Normal range of motion and neck supple. Right lower leg: No edema. Left lower leg: No edema. Lymphadenopathy:      Cervical: No cervical adenopathy. Skin:     General: Skin is warm and dry. Findings: No rash. Neurological:      Mental Status: She is alert and oriented to person, place, and time. Psychiatric:         Attention and Perception: Attention normal.         Mood and Affect: Affect normal. Mood is anxious (mild). Mood is not depressed. Speech: Speech normal.         Behavior: Behavior normal. Behavior is cooperative. Thought Content: Thought content normal. Thought content does not include homicidal or suicidal ideation. Thought content does not include homicidal or suicidal plan.          Cognition and Memory: Cognition normal.         Judgment: Judgment normal.       Data:     Lab Results   Component Value Date/Time     07/26/2022 07:35 AM    K 4.5 07/26/2022 07:35 AM     07/26/2022 07:35 AM    CO2 26 07/26/2022 07:35 AM    BUN 15 07/26/2022 07:35 AM    CREATININE 0.60 07/26/2022 07:35 AM    GLUCOSE 110 07/26/2022 07:35 AM    PROT 7.3 11/20/2019 08:53 AM    LABALBU 3.9 11/20/2019 08:53 AM    BILITOT <0.10 11/20/2019 08:53 AM    ALKPHOS 86 11/20/2019 08:53 AM    AST 18 07/26/2022 07:35 AM    ALT 24 07/26/2022 07:35 AM     Lab Results   Component Value Date/Time    WBC 11.0 07/26/2022 07:35 AM    RBC 4.09 07/26/2022 07:35 AM    HGB 12.6 07/26/2022 07:35 AM    HCT 39.8 07/26/2022 07:35 AM    MCV 97.3 07/26/2022 07:35 AM    MCH 30.8 07/26/2022 07:35 AM    MCHC 31.7 07/26/2022 07:35 AM    RDW 12.7 07/26/2022 07:35 AM     07/26/2022 07:35 AM    MPV 8.5 07/26/2022 07:35 AM     Lab Results Component Value Date/Time    TSH 1.86 06/10/2021 05:07 PM     Lab Results   Component Value Date/Time    CHOL 154 07/26/2022 07:34 AM    HDL 53 07/26/2022 07:34 AM    LABA1C 6.3 07/26/2022 07:35 AM       Assessment/Plan:      Diagnosis Orders   1. Controlled type 2 diabetes mellitus with diabetic polyneuropathy, without long-term current use of insulin ([address]        2. Dyslipidemia        3. Anxiety and depression  venlafaxine (EFFEXOR XR) 75 MG extended release capsule    venlafaxine (EFFEXOR XR) 150 MG extended release capsule      4. Positive depression screening          We will continue all current medications. Labs are stable. We will increase the gabapentin to 300 mg 2 times daily. Hopefully this will help with controlling anxiety. Call in a week or so with progress. Strongly encourage counseling. We will see her back in 6 months for routine check, sooner if any issues. 1.  Paulina Carmen received counseling on the following healthy behaviors: nutrition, exercise, and medication adherence  2. Patient given educational materials - see patient instructions  3. Was a self-tracking handout given in paper form or via [employer]? No  If yes, see orders or list here. 4.  Discussed use, benefit, and side effects of prescribed medications. Barriers to medication compliance addressed. All patient questions answered. Pt voiced understanding. 5.  Reviewed prior labs and health maintenance  6. Continue current medications, diet and exercise.     Completed Refills   Requested Prescriptions     Signed Prescriptions Disp Refills    venlafaxine (EFFEXOR XR) 75 MG extended release capsule 90 capsule 3     Sig: Take 1 capsule by mouth once daily    venlafaxine (EFFEXOR XR) 150 MG extended release capsule 90 capsule 3     Sig: Take 1 capsule by mouth once daily    gabapentin (NEURONTIN) 300 MG capsule 180 capsule 0     Sig: TAKE 1 CAPSULE BY MOUTH TWO TIMES DAILY FOR 90 DAYS         Return in about 6 months (around 1/27/2023) for Check up- A1c in office. PHQ-9 score today: (PHQ-9 Total Score: 14), additional evaluation and assessment performed, follow-up plan includes but not limited to: Medication management and Referral to /Specialist  for evaluation and management.

## 2022-07-27 NOTE — PATIENT INSTRUCTIONS
SURVEY:     You may be receiving a survey from [employer] regarding your visit today. Please complete the survey to enable us to provide the highest quality of care to you and your family. If you cannot score us a very good on any question, please call the office to discuss how we could have made your experience a very good one. Thank you.   Luz Johnson, APRN-LINN Ta, CNP  Maria Elena Fernandez, LPN  Charlene Escobar, SAJAN Romo, SAJAN Caceres, CMA  Adriana, PCA  [place], PM

## 2022-08-02 DIAGNOSIS — F51.01 PRIMARY INSOMNIA: ICD-10-CM

## 2022-08-02 DIAGNOSIS — E11.9 CONTROLLED TYPE 2 DIABETES MELLITUS WITHOUT COMPLICATION, WITHOUT LONG-TERM CURRENT USE OF INSULIN (HCC): ICD-10-CM

## 2022-08-02 DIAGNOSIS — E55.9 VITAMIN D DEFICIENCY: ICD-10-CM

## 2022-08-02 RX ORDER — LISINOPRIL 2.5 MG/1
TABLET ORAL
Qty: 90 TABLET | Refills: 0 | Status: SHIPPED | OUTPATIENT
Start: 2022-08-02 | End: 2022-10-27

## 2022-08-02 RX ORDER — CHOLECALCIFEROL (VITAMIN D3) 25 MCG
CAPSULE ORAL
Qty: 180 CAPSULE | Refills: 0 | Status: SHIPPED | OUTPATIENT
Start: 2022-08-02 | End: 2022-10-27

## 2022-08-02 RX ORDER — AMITRIPTYLINE HYDROCHLORIDE 50 MG/1
50 TABLET, FILM COATED ORAL NIGHTLY
Qty: 90 TABLET | Refills: 0 | Status: SHIPPED | OUTPATIENT
Start: 2022-08-02 | End: 2022-10-27

## 2022-10-27 DIAGNOSIS — F51.01 PRIMARY INSOMNIA: ICD-10-CM

## 2022-10-27 DIAGNOSIS — J30.1 SEASONAL ALLERGIC RHINITIS DUE TO POLLEN: ICD-10-CM

## 2022-10-27 DIAGNOSIS — F41.9 ANXIETY AND DEPRESSION: ICD-10-CM

## 2022-10-27 DIAGNOSIS — E55.9 VITAMIN D DEFICIENCY: ICD-10-CM

## 2022-10-27 DIAGNOSIS — F32.A ANXIETY AND DEPRESSION: ICD-10-CM

## 2022-10-27 DIAGNOSIS — E11.9 CONTROLLED TYPE 2 DIABETES MELLITUS WITHOUT COMPLICATION, WITHOUT LONG-TERM CURRENT USE OF INSULIN (HCC): ICD-10-CM

## 2022-10-27 RX ORDER — CHOLECALCIFEROL (VITAMIN D3) 25 MCG
CAPSULE ORAL
Qty: 180 CAPSULE | Refills: 1 | Status: SHIPPED | OUTPATIENT
Start: 2022-10-27

## 2022-10-27 RX ORDER — AMITRIPTYLINE HYDROCHLORIDE 50 MG/1
50 TABLET, FILM COATED ORAL NIGHTLY
Qty: 90 TABLET | Refills: 1 | Status: SHIPPED | OUTPATIENT
Start: 2022-10-27

## 2022-10-27 RX ORDER — MONTELUKAST SODIUM 10 MG/1
TABLET ORAL
Qty: 90 TABLET | Refills: 0 | Status: SHIPPED | OUTPATIENT
Start: 2022-10-27

## 2022-10-27 RX ORDER — BUSPIRONE HYDROCHLORIDE 15 MG/1
TABLET ORAL
Qty: 180 TABLET | Refills: 0 | Status: SHIPPED | OUTPATIENT
Start: 2022-10-27

## 2022-10-27 RX ORDER — GABAPENTIN 300 MG/1
CAPSULE ORAL
Qty: 180 CAPSULE | Refills: 0 | Status: SHIPPED | OUTPATIENT
Start: 2022-10-27 | End: 2023-01-27

## 2022-10-27 RX ORDER — LISINOPRIL 2.5 MG/1
TABLET ORAL
Qty: 90 TABLET | Refills: 1 | Status: SHIPPED | OUTPATIENT
Start: 2022-10-27

## 2023-01-04 RX ORDER — ORAL SEMAGLUTIDE 14 MG/1
TABLET ORAL
Qty: 90 TABLET | Refills: 1 | Status: SHIPPED | OUTPATIENT
Start: 2023-01-04

## 2023-01-27 ENCOUNTER — OFFICE VISIT (OUTPATIENT)
Dept: PRIMARY CARE CLINIC | Age: 38
End: 2023-01-27
Payer: COMMERCIAL

## 2023-01-27 VITALS
RESPIRATION RATE: 20 BRPM | OXYGEN SATURATION: 98 % | BODY MASS INDEX: 36.72 KG/M2 | DIASTOLIC BLOOD PRESSURE: 74 MMHG | HEART RATE: 78 BPM | TEMPERATURE: 97.7 F | SYSTOLIC BLOOD PRESSURE: 118 MMHG | WEIGHT: 227.5 LBS

## 2023-01-27 DIAGNOSIS — F41.9 ANXIETY AND DEPRESSION: ICD-10-CM

## 2023-01-27 DIAGNOSIS — E78.5 DYSLIPIDEMIA: ICD-10-CM

## 2023-01-27 DIAGNOSIS — F32.A ANXIETY AND DEPRESSION: ICD-10-CM

## 2023-01-27 DIAGNOSIS — J30.1 SEASONAL ALLERGIC RHINITIS DUE TO POLLEN: ICD-10-CM

## 2023-01-27 DIAGNOSIS — E11.9 CONTROLLED TYPE 2 DIABETES MELLITUS WITHOUT COMPLICATION, WITHOUT LONG-TERM CURRENT USE OF INSULIN (HCC): Primary | ICD-10-CM

## 2023-01-27 DIAGNOSIS — G62.9 PERIPHERAL POLYNEUROPATHY: Primary | ICD-10-CM

## 2023-01-27 LAB — HBA1C MFR BLD: 8 %

## 2023-01-27 PROCEDURE — 99214 OFFICE O/P EST MOD 30 MIN: CPT | Performed by: NURSE PRACTITIONER

## 2023-01-27 PROCEDURE — 2022F DILAT RTA XM EVC RTNOPTHY: CPT | Performed by: NURSE PRACTITIONER

## 2023-01-27 PROCEDURE — G8427 DOCREV CUR MEDS BY ELIG CLIN: HCPCS | Performed by: NURSE PRACTITIONER

## 2023-01-27 PROCEDURE — 83036 HEMOGLOBIN GLYCOSYLATED A1C: CPT | Performed by: NURSE PRACTITIONER

## 2023-01-27 PROCEDURE — 3052F HG A1C>EQUAL 8.0%<EQUAL 9.0%: CPT | Performed by: NURSE PRACTITIONER

## 2023-01-27 PROCEDURE — G8484 FLU IMMUNIZE NO ADMIN: HCPCS | Performed by: NURSE PRACTITIONER

## 2023-01-27 PROCEDURE — G8417 CALC BMI ABV UP PARAM F/U: HCPCS | Performed by: NURSE PRACTITIONER

## 2023-01-27 PROCEDURE — 4004F PT TOBACCO SCREEN RCVD TLK: CPT | Performed by: NURSE PRACTITIONER

## 2023-01-27 RX ORDER — MONTELUKAST SODIUM 10 MG/1
TABLET ORAL
Qty: 90 TABLET | Refills: 3 | Status: SHIPPED | OUTPATIENT
Start: 2023-01-27

## 2023-01-27 RX ORDER — ONDANSETRON 4 MG/1
TABLET, FILM COATED ORAL
Qty: 15 TABLET | Refills: 0 | Status: SHIPPED | OUTPATIENT
Start: 2023-01-27

## 2023-01-27 RX ORDER — GABAPENTIN 300 MG/1
CAPSULE ORAL
Qty: 180 CAPSULE | Refills: 0 | Status: SHIPPED | OUTPATIENT
Start: 2023-01-27 | End: 2023-04-27

## 2023-01-27 SDOH — ECONOMIC STABILITY: FOOD INSECURITY: WITHIN THE PAST 12 MONTHS, YOU WORRIED THAT YOUR FOOD WOULD RUN OUT BEFORE YOU GOT MONEY TO BUY MORE.: PATIENT DECLINED

## 2023-01-27 SDOH — ECONOMIC STABILITY: FOOD INSECURITY: WITHIN THE PAST 12 MONTHS, THE FOOD YOU BOUGHT JUST DIDN'T LAST AND YOU DIDN'T HAVE MONEY TO GET MORE.: PATIENT DECLINED

## 2023-01-27 ASSESSMENT — PATIENT HEALTH QUESTIONNAIRE - PHQ9
2. FEELING DOWN, DEPRESSED OR HOPELESS: 0
SUM OF ALL RESPONSES TO PHQ9 QUESTIONS 1 & 2: 0
10. IF YOU CHECKED OFF ANY PROBLEMS, HOW DIFFICULT HAVE THESE PROBLEMS MADE IT FOR YOU TO DO YOUR WORK, TAKE CARE OF THINGS AT HOME, OR GET ALONG WITH OTHER PEOPLE: 0
3. TROUBLE FALLING OR STAYING ASLEEP: 0
7. TROUBLE CONCENTRATING ON THINGS, SUCH AS READING THE NEWSPAPER OR WATCHING TELEVISION: 0
1. LITTLE INTEREST OR PLEASURE IN DOING THINGS: 0
8. MOVING OR SPEAKING SO SLOWLY THAT OTHER PEOPLE COULD HAVE NOTICED. OR THE OPPOSITE, BEING SO FIGETY OR RESTLESS THAT YOU HAVE BEEN MOVING AROUND A LOT MORE THAN USUAL: 0
SUM OF ALL RESPONSES TO PHQ QUESTIONS 1-9: 0
6. FEELING BAD ABOUT YOURSELF - OR THAT YOU ARE A FAILURE OR HAVE LET YOURSELF OR YOUR FAMILY DOWN: 0
9. THOUGHTS THAT YOU WOULD BE BETTER OFF DEAD, OR OF HURTING YOURSELF: 0
SUM OF ALL RESPONSES TO PHQ QUESTIONS 1-9: 0
4. FEELING TIRED OR HAVING LITTLE ENERGY: 0
5. POOR APPETITE OR OVEREATING: 0

## 2023-01-27 ASSESSMENT — ENCOUNTER SYMPTOMS
RHINORRHEA: 0
WHEEZING: 0
SHORTNESS OF BREATH: 0
COUGH: 0
NAUSEA: 1
BACK PAIN: 0
SORE THROAT: 0
VOMITING: 0
ABDOMINAL PAIN: 0
DIARRHEA: 0
CONSTIPATION: 0
VISUAL CHANGE: 0

## 2023-01-27 ASSESSMENT — SOCIAL DETERMINANTS OF HEALTH (SDOH): HOW HARD IS IT FOR YOU TO PAY FOR THE VERY BASICS LIKE FOOD, HOUSING, MEDICAL CARE, AND HEATING?: PATIENT DECLINED

## 2023-01-27 NOTE — PROGRESS NOTES
Name: Genny Jimenez  : 1985         Chief Complaint:     Chief Complaint   Patient presents with    Diabetes     Routine check       History of Present Illness:      Genny Jimenez is a 30 y.o.  female who presents with Diabetes (Routine check)      Tabitha Gonzalez is here today for a routine office visit. Diabetes  She presents for her follow-up diabetic visit. She has type 2 diabetes mellitus. Onset time: YEARS. Her disease course has been worsening. Pertinent negatives for hypoglycemia include no dizziness, headaches, pallor, seizures, sleepiness or speech difficulty. Pertinent negatives for diabetes include no chest pain, no fatigue, no foot paresthesias, no polydipsia, no polyphagia, no polyuria, no visual change and no weakness. There are no hypoglycemic complications. Pertinent negatives for hypoglycemia complications include no blackouts, no required assistance and no required glucagon injection. Symptoms are stable. There are no diabetic complications. Pertinent negatives for diabetic complications include no CVA, heart disease, nephropathy or peripheral neuropathy. Risk factors for coronary artery disease include diabetes mellitus, dyslipidemia, family history, obesity, sedentary lifestyle and stress. Current diabetic treatment includes insulin injections and oral agent (triple therapy). She is compliant with treatment all of the time. Her weight is stable. She is following a generally unhealthy diet. When asked about meal planning, she reported none. She has had a previous visit with a dietitian. She never participates in exercise. Her home blood glucose trend is increasing steadily. Her breakfast blood glucose range is generally 140-180 mg/dl. An ACE inhibitor/angiotensin II receptor blocker is being taken. She does not see a podiatrist.Eye exam is current. Hyperlipidemia  This is a chronic problem. The current episode started more than 1 year ago. The problem is controlled.  Recent lipid tests were reviewed and are normal. Exacerbating diseases include diabetes and obesity. She has no history of hypothyroidism or liver disease. Factors aggravating her hyperlipidemia include fatty foods and smoking. Pertinent negatives include no chest pain, focal sensory loss, focal weakness or shortness of breath. Current antihyperlipidemic treatment includes statins. The current treatment provides moderate improvement of lipids. Compliance problems include adherence to exercise and adherence to diet. Risk factors for coronary artery disease include diabetes mellitus, dyslipidemia, obesity and stress. Past Medical History:     Past Medical History:   Diagnosis Date    Acute bronchitis with asthma 6/19/2020    Anxiety and depression     Depression     Female stress incontinence 11/23/2015    Obesity     Type II or unspecified type diabetes mellitus without mention of complication, not stated as uncontrolled     Wears glasses       Reviewed all health maintenance requirements and ordered appropriate tests  Health Maintenance Due   Topic Date Due    Cervical cancer screen  Never done       Past Surgical History:     Past Surgical History:   Procedure Laterality Date    TUBAL LIGATION      WISDOM TOOTH EXTRACTION          Medications:       Prior to Admission medications    Medication Sig Start Date End Date Taking?  Authorizing Provider   ondansetron (ZOFRAN) 4 MG tablet TAKE 1 TABLET BY MOUTH THREE TIMES DAILY AS NEEDED FOR NAUSEA FOR VOMITING 1/27/23  Yes Natalie Johnson, NINA - CNP   empagliflozin (JARDIANCE) 25 MG tablet Take 1 tablet by mouth daily 1/27/23  Yes NINA Munguia CNP   RYBELSUS 14 MG TABS Take 1 tablet by mouth once daily 1/4/23  Yes NINA Munguia - CNP   Cholecalciferol (VITAMIN D-3) 25 MCG (1000 UT) CAPS Take 2 capsules by mouth once daily 10/27/22  Yes NINA Munguia CNP   lisinopril (PRINIVIL;ZESTRIL) 2.5 MG tablet Take 1 tablet by mouth once daily 10/27/22  Yes Natalie Doran Might, NINA - CNP   busPIRone (BUSPAR) 15 MG tablet Take 1 tablet by mouth twice daily 10/27/22  Yes Sparks [place] Failing Might, NINA - CNP   amitriptyline (ELAVIL) 50 MG tablet Take 1 tablet by mouth nightly 10/27/22  Yes Sparks [place] Failing Might, NINA - CNP   metFORMIN (GLUCOPHAGE) 1000 MG tablet TAKE 1 TABLET BY MOUTH TWICE DAILY WITH MEALS 10/27/22  Yes Sparks [place] Failing Might, APRN - CNP   montelukast (SINGULAIR) 10 MG tablet Take 1 tablet by mouth once daily 10/27/22  Yes Sparks [place] Failing Might, NINA - CNP   gabapentin (NEURONTIN) 300 MG capsule Take 1 capsule by mouth twice daily 10/27/22 1/27/23 Yes Sparks [place] Failing Might, NINA - CNP   venlafaxine (EFFEXOR XR) 75 MG extended release capsule Take 1 capsule by mouth once daily 7/27/22  Yes Sparks [place] Failing Might, NINA - CNP   venlafaxine (EFFEXOR XR) 150 MG extended release capsule Take 1 capsule by mouth once daily 7/27/22  Yes Sparks [place] Failing Might, NINA Pack CNP   atorvastatin (LIPITOR) 20 MG tablet Take 1 tablet by mouth once daily 5/31/22  Yes Sparks [place] Failing Might, NINA Pack CNP   NONFORMULARY Medical Marijuana Card   Yes Historical Provider, MD   glucose monitoring kit (FREESTYLE) monitoring kit 1 kit by Does not apply route daily Substitute as needed for insurance requirements. 5/7/21  Yes NINA Brown CNP   blood glucose test strips (TRUE METRIX BLOOD GLUCOSE TEST) strip 1 each by In Vitro route daily As needed. 5/7/21  Yes [de-identified] M NINA Minor CNP   Lancets MISC Substitute as needed for insurance requirements. Dx 250.00, testing daily. 5/7/21 1/3/25 Yes [de-identified] M NINA Minor CNP        Allergies:       Patient has no known allergies. Social History:     Tobacco:    reports that she has been smoking cigarettes. She has been smoking an average of .25 packs per day. She has never used smokeless tobacco.  Alcohol:      reports that she does not currently use alcohol. Drug Use:  reports current drug use. Drug: Marijuana Hulon Baum).     Family History:     Family History   Adopted: Yes   Problem Relation Age of Onset    Other Mother         OVER Dose    Other Father         Over dose       Review of Systems:     Positive and Negative as described in HPI    Review of Systems   Constitutional:  Negative for appetite change, chills, fatigue, fever and unexpected weight change. HENT:  Negative for congestion, rhinorrhea and sore throat. Eyes:  Negative for visual disturbance. Respiratory:  Negative for cough, shortness of breath and wheezing. Cardiovascular:  Negative for chest pain and palpitations. Gastrointestinal:  Positive for nausea. Negative for abdominal pain, constipation, diarrhea and vomiting. Endocrine: Negative for polydipsia, polyphagia and polyuria. Genitourinary:  Negative for difficulty urinating and dysuria. Musculoskeletal:  Negative for back pain, gait problem, neck pain and neck stiffness. Skin:  Negative for pallor and rash. Neurological:  Negative for dizziness, focal weakness, seizures, syncope, speech difficulty, weakness, light-headedness and headaches. Physical Exam:   Vitals:  /74 (Position: Sitting)   Pulse 78   Temp 97.7 °F (36.5 °C) (Temporal)   Resp 20   Wt 227 lb 8 oz (103.2 kg)   SpO2 98%   BMI 36.72 kg/m²     Physical Exam  Vitals and nursing note reviewed. Constitutional:       General: She is not in acute distress. Appearance: Normal appearance. She is well-developed. She is obese. She is not ill-appearing. HENT:      Mouth/Throat:      Mouth: Mucous membranes are moist. Mucous membranes are not dry. Eyes:      Conjunctiva/sclera: Conjunctivae normal.   Cardiovascular:      Rate and Rhythm: Normal rate and regular rhythm. Heart sounds: Normal heart sounds. Pulmonary:      Effort: Pulmonary effort is normal.      Breath sounds: Normal breath sounds. No wheezing. Abdominal:      General: Bowel sounds are normal. There is no distension. Palpations: Abdomen is soft. Tenderness: There is no abdominal tenderness.    Musculoskeletal:      Cervical back: Normal range of motion and neck supple. Right lower leg: No edema. Left lower leg: No edema. Lymphadenopathy:      Cervical: No cervical adenopathy. Skin:     General: Skin is warm and dry. Findings: No rash. Neurological:      Mental Status: She is alert and oriented to person, place, and time. Data:     Lab Results   Component Value Date/Time     07/26/2022 07:35 AM    K 4.5 07/26/2022 07:35 AM     07/26/2022 07:35 AM    CO2 26 07/26/2022 07:35 AM    BUN 15 07/26/2022 07:35 AM    CREATININE 0.60 07/26/2022 07:35 AM    GLUCOSE 110 07/26/2022 07:35 AM    PROT 7.3 11/20/2019 08:53 AM    LABALBU 3.9 11/20/2019 08:53 AM    BILITOT <0.10 11/20/2019 08:53 AM    ALKPHOS 86 11/20/2019 08:53 AM    AST 18 07/26/2022 07:35 AM    ALT 24 07/26/2022 07:35 AM     Lab Results   Component Value Date/Time    WBC 11.0 07/26/2022 07:35 AM    RBC 4.09 07/26/2022 07:35 AM    HGB 12.6 07/26/2022 07:35 AM    HCT 39.8 07/26/2022 07:35 AM    MCV 97.3 07/26/2022 07:35 AM    MCH 30.8 07/26/2022 07:35 AM    MCHC 31.7 07/26/2022 07:35 AM    RDW 12.7 07/26/2022 07:35 AM     07/26/2022 07:35 AM    MPV 8.5 07/26/2022 07:35 AM     Lab Results   Component Value Date/Time    TSH 1.86 06/10/2021 05:07 PM     Lab Results   Component Value Date/Time    CHOL 154 07/26/2022 07:34 AM    HDL 53 07/26/2022 07:34 AM    LABA1C 8.0 01/27/2023 07:10 AM       Assessment/Plan:      Diagnosis Orders   1. Controlled type 2 diabetes mellitus without complication, without long-term current use of insulin (HCC)  POCT glycosylated hemoglobin (Hb A1C)    CBC with Auto Differential    ALT    AST    Basic Metabolic Panel    Hemoglobin A1C    Lipid Panel    Microalbumin, Ur    empagliflozin (JARDIANCE) 25 MG tablet      2. Dyslipidemia          We will continue all current medications and add Jardiance 25 mg daily. I would like her to record fasting blood sugars and call weekly with readings.   Sooner if readings over 200.    Try to improve diet especially with avoiding drinking regular soda. We will see her back in 6 months with routine labs, sooner if any issues. 1.  Elliott Penny received counseling on the following healthy behaviors: nutrition, exercise, and medication adherence  2. Patient given educational materials - see patient instructions  3. Was a self-tracking handout given in paper form or via [employer]? No  If yes, see orders or list here. 4.  Discussed use, benefit, and side effects of prescribed medications. Barriers to medication compliance addressed. All patient questions answered. Pt voiced understanding. 5.  Reviewed prior labs and health maintenance  6. Continue current medications, diet and exercise. Completed Refills   Requested Prescriptions     Signed Prescriptions Disp Refills    ondansetron (ZOFRAN) 4 MG tablet 15 tablet 0     Sig: TAKE 1 TABLET BY MOUTH THREE TIMES DAILY AS NEEDED FOR NAUSEA FOR VOMITING    empagliflozin (JARDIANCE) 25 MG tablet 90 tablet 3     Sig: Take 1 tablet by mouth daily         Return in about 6 months (around 7/27/2023) for Check up.

## 2023-01-27 NOTE — PATIENT INSTRUCTIONS
SURVEY:     You may be receiving a survey from [employer] regarding your visit today. Please complete the survey to enable us to provide the highest quality of care to you and your family. If you cannot score us a very good on any question, please call the office to discuss how we could have made your experience a very good one.      Thank you,    Robert Johnson, APRN-CNP  Jenny Tam, APRN-CNP  Nilsa Bernal, PEPE Tam, SAJAN Robert, SAJAN Caceres, CMA  Adriana, PCA  Shelby, PM

## 2023-01-29 RX ORDER — BUSPIRONE HYDROCHLORIDE 15 MG/1
TABLET ORAL
Qty: 180 TABLET | Refills: 3 | Status: SHIPPED | OUTPATIENT
Start: 2023-01-29

## 2023-02-22 ENCOUNTER — PATIENT MESSAGE (OUTPATIENT)
Dept: PRIMARY CARE CLINIC | Age: 38
End: 2023-02-22

## 2023-02-22 DIAGNOSIS — E11.9 CONTROLLED TYPE 2 DIABETES MELLITUS WITHOUT COMPLICATION, WITHOUT LONG-TERM CURRENT USE OF INSULIN (HCC): Primary | ICD-10-CM

## 2023-02-22 NOTE — TELEPHONE ENCOUNTER
From: Luís Juan  To: Alex Casanovas Might  Sent: 2/22/2023 8:09 AM EST  Subject: Pilo and strips    My pilo is not working right can you send in another one and Im almost out of strips.  Thank you so much

## 2023-02-22 NOTE — TELEPHONE ENCOUNTER
Health Maintenance   Topic Date Due    Cervical cancer screen  Never done    Diabetic retinal exam  08/06/2023 (Originally 3/29/2019)    Diabetic foot exam  01/27/2024 (Originally 12/1/2021)    Hepatitis B vaccine (1 of 3 - Risk 3-dose series) 01/27/2024 (Originally 9/2/2006)    DTaP/Tdap/Td vaccine (2 - Tdap) 01/27/2024 (Originally 6/28/2015)    Flu vaccine (1) 01/27/2024 (Originally 8/1/2022)    COVID-19 Vaccine (4 - Booster for Pfizer series) 01/27/2024 (Originally 3/27/2022)    Diabetic Alb to Cr ratio (uACR) test  07/26/2023    Lipids  07/26/2023    GFR test (Diabetes, CKD 3-4, OR last GFR 15-59)  07/26/2023    A1C test (Diabetic or Prediabetic)  01/27/2024    Depression Monitoring  01/27/2024    Pneumococcal 0-64 years Vaccine  Completed    Hepatitis C screen  Completed    HIV screen  Completed    Hepatitis A vaccine  Aged Out    Hib vaccine  Aged Out    Meningococcal (ACWY) vaccine  Aged Out    Varicella vaccine  Discontinued             (applicable per patient's age: Cancer Screenings, Depression Screening, Fall Risk Screening, Immunizations)    Hemoglobin A1C (%)   Date Value   01/27/2023 8.0   07/26/2022 6.3 (H)   04/04/2022 5.8     Microalb/Crt.  Ratio (mcg/mg creat)   Date Value   07/26/2022 11     LDL Cholesterol (mg/dL)   Date Value   07/26/2022 76     AST (U/L)   Date Value   07/26/2022 18     ALT (U/L)   Date Value   07/26/2022 24     BUN (mg/dL)   Date Value   07/26/2022 15      (goal A1C is < 7)   (goal LDL is <100) need 30-50% reduction from baseline     BP Readings from Last 3 Encounters:   01/27/23 118/74   07/27/22 128/74   06/21/22 105/73    (goal /80)      All Future Testing planned in [facility]:  Lab Frequency Next Occurrence   CBC with Auto Differential Once 07/26/2023   ALT Once 07/27/2023   AST Once 08/65/5879   Basic Metabolic Panel Once 05/41/3791   Hemoglobin A1C Once 07/26/2023   Lipid Panel Once 07/26/2023   Microalbumin, Ur Once 07/26/2023       Next Visit Date:  Future Appointments   Date Time Provider Rohini Cabral   8/2/2023  4:20 PM Adama Johnson, APRN - CNP [facility]            Patient Active Problem List:     Dyslipidemia     Insomnia     Anxiety and depression     Adopted     Urinary urgency     Female stress incontinence     Controlled type 2 diabetes mellitus without complication, without long-term current use of insulin (HCC)     Nocturia     Frequency of urination     Mixed incontinence     Acute bronchitis with asthma

## 2023-05-02 DIAGNOSIS — G62.9 PERIPHERAL POLYNEUROPATHY: ICD-10-CM

## 2023-05-02 DIAGNOSIS — E11.9 CONTROLLED TYPE 2 DIABETES MELLITUS WITHOUT COMPLICATION, WITHOUT LONG-TERM CURRENT USE OF INSULIN (HCC): ICD-10-CM

## 2023-05-02 RX ORDER — GABAPENTIN 300 MG/1
CAPSULE ORAL
Qty: 180 CAPSULE | Refills: 0 | Status: SHIPPED | OUTPATIENT
Start: 2023-05-02 | End: 2023-08-02

## 2023-05-02 RX ORDER — ISOPROPYL ALCOHOL 0.75 G/1
SWAB TOPICAL
Qty: 100 EACH | Refills: 0 | Status: SHIPPED | OUTPATIENT
Start: 2023-05-02

## 2023-05-02 RX ORDER — LISINOPRIL 2.5 MG/1
TABLET ORAL
Qty: 90 TABLET | Refills: 3 | Status: SHIPPED | OUTPATIENT
Start: 2023-05-02

## 2023-05-19 DIAGNOSIS — F51.01 PRIMARY INSOMNIA: ICD-10-CM

## 2023-05-19 DIAGNOSIS — E55.9 VITAMIN D DEFICIENCY: ICD-10-CM

## 2023-05-19 RX ORDER — ISOPROPYL ALCOHOL 0.75 G/1
SWAB TOPICAL
Qty: 100 EACH | Refills: 0 | Status: SHIPPED | OUTPATIENT
Start: 2023-05-19

## 2023-05-19 RX ORDER — AMITRIPTYLINE HYDROCHLORIDE 50 MG/1
50 TABLET, FILM COATED ORAL NIGHTLY
Qty: 90 TABLET | Refills: 1 | Status: SHIPPED | OUTPATIENT
Start: 2023-05-19

## 2023-05-19 RX ORDER — CHOLECALCIFEROL (VITAMIN D3) 25 MCG
CAPSULE ORAL
Qty: 180 CAPSULE | Refills: 1 | Status: SHIPPED | OUTPATIENT
Start: 2023-05-19

## 2023-05-19 NOTE — TELEPHONE ENCOUNTER
Health Maintenance   Topic Date Due    Cervical cancer screen  Never done    Diabetic retinal exam  08/06/2023 (Originally 3/29/2019)    Diabetic foot exam  01/27/2024 (Originally 12/1/2021)    Hepatitis B vaccine (1 of 3 - Risk 3-dose series) 01/27/2024 (Originally 9/2/2006)    DTaP/Tdap/Td vaccine (2 - Tdap) 01/27/2024 (Originally 6/28/2015)    Flu vaccine (Season Ended) 01/27/2024 (Originally 8/1/2023)    COVID-19 Vaccine (4 - Booster for Pfizer series) 01/27/2024 (Originally 3/27/2022)    Diabetic Alb to Cr ratio (uACR) test  07/26/2023    Lipids  07/26/2023    GFR test (Diabetes, CKD 3-4, OR last GFR 15-59)  07/26/2023    A1C test (Diabetic or Prediabetic)  01/27/2024    Depression Monitoring  01/27/2024    Pneumococcal 0-64 years Vaccine  Completed    Hepatitis C screen  Completed    HIV screen  Completed    Hepatitis A vaccine  Aged Out    Hib vaccine  Aged Out    Meningococcal (ACWY) vaccine  Aged Out    Varicella vaccine  Discontinued             (applicable per patient's age: Cancer Screenings, Depression Screening, Fall Risk Screening, Immunizations)    Hemoglobin A1C (%)   Date Value   01/27/2023 8.0   07/26/2022 6.3 (H)   04/04/2022 5.8     Microalb/Crt.  Ratio (mcg/mg creat)   Date Value   07/26/2022 11     LDL Cholesterol (mg/dL)   Date Value   07/26/2022 76     AST (U/L)   Date Value   07/26/2022 18     ALT (U/L)   Date Value   07/26/2022 24     BUN (mg/dL)   Date Value   07/26/2022 15      (goal A1C is < 7)   (goal LDL is <100) need 30-50% reduction from baseline     BP Readings from Last 3 Encounters:   01/27/23 118/74   07/27/22 128/74   06/21/22 105/73    (goal /80)      All Future Testing planned in [facility]:  Lab Frequency Next Occurrence   CBC with Auto Differential Once 07/26/2023   ALT Once 07/27/2023   AST Once 90/70/7876   Basic Metabolic Panel Once 63/70/1804   Hemoglobin A1C Once 07/26/2023   Lipid Panel Once 07/26/2023   Microalbumin, Ur Once 07/26/2023       Next Visit

## 2023-05-23 ENCOUNTER — NURSE ONLY (OUTPATIENT)
Dept: PRIMARY CARE CLINIC | Age: 38
End: 2023-05-23

## 2023-05-23 ENCOUNTER — TELEPHONE (OUTPATIENT)
Dept: PRIMARY CARE CLINIC | Age: 38
End: 2023-05-23

## 2023-05-23 VITALS
SYSTOLIC BLOOD PRESSURE: 122 MMHG | DIASTOLIC BLOOD PRESSURE: 72 MMHG | RESPIRATION RATE: 18 BRPM | TEMPERATURE: 98.2 F | BODY MASS INDEX: 36.64 KG/M2 | WEIGHT: 227 LBS

## 2023-05-23 DIAGNOSIS — E11.9 CONTROLLED TYPE 2 DIABETES MELLITUS WITHOUT COMPLICATION, WITHOUT LONG-TERM CURRENT USE OF INSULIN (HCC): Primary | ICD-10-CM

## 2023-05-23 SDOH — ECONOMIC STABILITY: INCOME INSECURITY: HOW HARD IS IT FOR YOU TO PAY FOR THE VERY BASICS LIKE FOOD, HOUSING, MEDICAL CARE, AND HEATING?: NOT HARD AT ALL

## 2023-05-23 SDOH — ECONOMIC STABILITY: HOUSING INSECURITY
IN THE LAST 12 MONTHS, WAS THERE A TIME WHEN YOU DID NOT HAVE A STEADY PLACE TO SLEEP OR SLEPT IN A SHELTER (INCLUDING NOW)?: NO

## 2023-05-23 SDOH — ECONOMIC STABILITY: FOOD INSECURITY: WITHIN THE PAST 12 MONTHS, THE FOOD YOU BOUGHT JUST DIDN'T LAST AND YOU DIDN'T HAVE MONEY TO GET MORE.: NEVER TRUE

## 2023-05-23 SDOH — ECONOMIC STABILITY: FOOD INSECURITY: WITHIN THE PAST 12 MONTHS, YOU WORRIED THAT YOUR FOOD WOULD RUN OUT BEFORE YOU GOT MONEY TO BUY MORE.: NEVER TRUE

## 2023-05-23 NOTE — TELEPHONE ENCOUNTER
Patient was in today for a continues glucose monitor Libre2 put on. She will need a new script for refills sent to the pharmacy for the sensors.   Please advise thank you

## 2023-06-05 ENCOUNTER — PATIENT MESSAGE (OUTPATIENT)
Dept: PRIMARY CARE CLINIC | Age: 38
End: 2023-06-05

## 2023-06-05 NOTE — TELEPHONE ENCOUNTER
From: Be Terrell  To: Sydell Mcardle  Sent: 6/5/2023 8:44 AM EDT  Subject: Edin Jimenez 2    Is there Any way I can get a note for my work stating I have to have my phone on me for my oscar. They said I just need a note and Ill be fine. Just something saying I need my phone and watch on at work to monitor my sugars. Thank you so much.  And this oscar is working out great

## 2023-07-04 RX ORDER — ORAL SEMAGLUTIDE 14 MG/1
TABLET ORAL
Qty: 90 TABLET | Refills: 3 | Status: SHIPPED | OUTPATIENT
Start: 2023-07-04

## 2023-07-26 ENCOUNTER — TELEPHONE (OUTPATIENT)
Dept: PRIMARY CARE CLINIC | Age: 38
End: 2023-07-26

## 2023-07-27 ENCOUNTER — HOSPITAL ENCOUNTER (OUTPATIENT)
Age: 38
Discharge: HOME | End: 2023-07-27
Payer: COMMERCIAL

## 2023-07-27 DIAGNOSIS — G62.9 PERIPHERAL POLYNEUROPATHY: ICD-10-CM

## 2023-07-27 DIAGNOSIS — E78.5 DYSLIPIDEMIA: ICD-10-CM

## 2023-07-27 DIAGNOSIS — E11.9 CONTROLLED TYPE 2 DIABETES MELLITUS WITHOUT COMPLICATION, WITHOUT LONG-TERM CURRENT USE OF INSULIN (HCC): ICD-10-CM

## 2023-07-27 LAB
ALT SERPL-CCNC: 21 U/L (ref 5–33)
ANION GAP SERPL CALCULATED.3IONS-SCNC: 12 MMOL/L (ref 9–17)
AST SERPL-CCNC: 17 U/L
BASOPHILS # BLD: 0.1 K/UL (ref 0–0.2)
BASOPHILS NFR BLD: 1 % (ref 0–2)
BUN SERPL-MCNC: 11 MG/DL (ref 6–20)
BUN/CREAT SERPL: 22 (ref 9–20)
CALCIUM SERPL-MCNC: 9.3 MG/DL (ref 8.6–10.4)
CHLORIDE SERPL-SCNC: 99 MMOL/L (ref 98–107)
CHOLEST SERPL-MCNC: 137 MG/DL
CHOLESTEROL/HDL RATIO: 3.4
CO2 SERPL-SCNC: 25 MMOL/L (ref 20–31)
CREAT SERPL-MCNC: 0.5 MG/DL (ref 0.5–0.9)
CREAT UR-MCNC: 82.8 MG/DL (ref 28–217)
EOSINOPHIL # BLD: 0.47 K/UL (ref 0–0.44)
EOSINOPHILS RELATIVE PERCENT: 6 % (ref 1–4)
ERYTHROCYTE [DISTWIDTH] IN BLOOD BY AUTOMATED COUNT: 13 % (ref 11.8–14.4)
GFR SERPL CREATININE-BSD FRML MDRD: >60 ML/MIN/1.73M2
GLUCOSE SERPL-MCNC: 107 MG/DL (ref 70–99)
HCT VFR BLD AUTO: 44.7 % (ref 36.3–47.1)
HDLC SERPL-MCNC: 40 MG/DL
HGB BLD-MCNC: 14 G/DL (ref 11.9–15.1)
IMM GRANULOCYTES # BLD AUTO: 0.03 K/UL (ref 0–0.3)
IMM GRANULOCYTES NFR BLD: 0 %
LDLC SERPL CALC-MCNC: 74 MG/DL (ref 0–130)
LYMPHOCYTES NFR BLD: 2.48 K/UL (ref 1.1–3.7)
LYMPHOCYTES RELATIVE PERCENT: 30 % (ref 24–43)
MCH RBC QN AUTO: 29.6 PG (ref 25.2–33.5)
MCHC RBC AUTO-ENTMCNC: 31.3 G/DL (ref 28.4–34.8)
MCV RBC AUTO: 94.5 FL (ref 82.6–102.9)
MICROALBUMIN UR-MCNC: <12 MG/L
MICROALBUMIN/CREAT UR-RTO: NORMAL MCG/MG CREAT
MONOCYTES NFR BLD: 0.47 K/UL (ref 0.1–1.2)
MONOCYTES NFR BLD: 6 % (ref 3–12)
NEUTROPHILS NFR BLD: 57 % (ref 36–65)
NEUTS SEG NFR BLD: 4.6 K/UL (ref 1.5–8.1)
NRBC BLD-RTO: 0 PER 100 WBC
PLATELET # BLD AUTO: 511 K/UL (ref 138–453)
PMV BLD AUTO: 9 FL (ref 8.1–13.5)
POTASSIUM SERPL-SCNC: 4.5 MMOL/L (ref 3.7–5.3)
RBC # BLD AUTO: 4.73 M/UL (ref 3.95–5.11)
SODIUM SERPL-SCNC: 136 MMOL/L (ref 135–144)
TRIGL SERPL-MCNC: 113 MG/DL
WBC OTHER # BLD: 8.2 K/UL (ref 3.5–11.3)

## 2023-07-27 PROCEDURE — 80061 LIPID PANEL: CPT

## 2023-07-27 PROCEDURE — 80048 BASIC METABOLIC PNL TOTAL CA: CPT

## 2023-07-27 PROCEDURE — 36415 COLL VENOUS BLD VENIPUNCTURE: CPT

## 2023-07-27 PROCEDURE — 85027 COMPLETE CBC AUTOMATED: CPT

## 2023-07-27 PROCEDURE — 82043 UR ALBUMIN QUANTITATIVE: CPT

## 2023-07-27 PROCEDURE — 83036 HEMOGLOBIN GLYCOSYLATED A1C: CPT

## 2023-07-27 PROCEDURE — 82570 ASSAY OF URINE CREATININE: CPT

## 2023-07-27 PROCEDURE — 84450 TRANSFERASE (AST) (SGOT): CPT

## 2023-07-27 PROCEDURE — 84460 ALANINE AMINO (ALT) (SGPT): CPT

## 2023-07-28 LAB
EST. AVERAGE GLUCOSE BLD GHB EST-MCNC: 163 MG/DL
HBA1C MFR BLD: 7.3 % (ref 4–6)

## 2023-07-28 RX ORDER — GABAPENTIN 300 MG/1
CAPSULE ORAL
Qty: 180 CAPSULE | Refills: 0 | Status: SHIPPED | OUTPATIENT
Start: 2023-07-28 | End: 2023-10-31

## 2023-07-28 RX ORDER — ATORVASTATIN CALCIUM 20 MG/1
TABLET, FILM COATED ORAL
Qty: 90 TABLET | Refills: 1 | Status: SHIPPED | OUTPATIENT
Start: 2023-07-28

## 2023-07-28 NOTE — TELEPHONE ENCOUNTER
Health Maintenance   Topic Date Due    Cervical cancer screen  Never done    Diabetic retinal exam  08/06/2023 (Originally 3/29/2019)    Diabetic foot exam  01/27/2024 (Originally 12/1/2021)    Hepatitis B vaccine (1 of 3 - Risk 3-dose series) 01/27/2024 (Originally 9/2/2006)    DTaP/Tdap/Td vaccine (2 - Tdap) 01/27/2024 (Originally 6/28/2015)    COVID-19 Vaccine (4 - Booster for Pfizer series) 01/27/2024 (Originally 3/27/2022)    Flu vaccine (1) 08/01/2023    Depression Monitoring  01/27/2024    A1C test (Diabetic or Prediabetic)  07/27/2024    Diabetic Alb to Cr ratio (uACR) test  07/27/2024    Lipids  07/27/2024    GFR test (Diabetes, CKD 3-4, OR last GFR 15-59)  07/27/2024    Pneumococcal 0-64 years Vaccine  Completed    Hepatitis C screen  Completed    HIV screen  Completed    Hepatitis A vaccine  Aged Out    Hib vaccine  Aged Out    Meningococcal (ACWY) vaccine  Aged Out    Varicella vaccine  Discontinued             (applicable per patient's age: Cancer Screenings, Depression Screening, Fall Risk Screening, Immunizations)    Hemoglobin A1C (%)   Date Value   07/27/2023 7.3 (H)   01/27/2023 8.0   07/26/2022 6.3 (H)     LDL Cholesterol (mg/dL)   Date Value   07/27/2023 74     AST (U/L)   Date Value   07/27/2023 17     ALT (U/L)   Date Value   07/27/2023 21     BUN (mg/dL)   Date Value   07/27/2023 11      (goal A1C is < 7)   (goal LDL is <100) need 30-50% reduction from baseline     BP Readings from Last 3 Encounters:   05/23/23 122/72   01/27/23 118/74   07/27/22 128/74    (goal /80)      All Future Testing planned in [facility]:  Lab Frequency Next Occurrence       Next Visit Date:  Future Appointments   Date Time Provider [address]   8/2/2023  4:20 PM Anna Johnson, APRN - CNP [facility]            Patient Active Problem List:     Dyslipidemia     Insomnia     Anxiety and depression     Adopted     Urinary urgency     Female stress incontinence     Controlled type 2 diabetes mellitus

## 2023-08-02 ENCOUNTER — OFFICE VISIT (OUTPATIENT)
Dept: PRIMARY CARE CLINIC | Age: 38
End: 2023-08-02
Payer: COMMERCIAL

## 2023-08-02 VITALS
WEIGHT: 212.6 LBS | SYSTOLIC BLOOD PRESSURE: 124 MMHG | DIASTOLIC BLOOD PRESSURE: 82 MMHG | TEMPERATURE: 98.5 F | RESPIRATION RATE: 20 BRPM | HEART RATE: 78 BPM | HEIGHT: 66 IN | BODY MASS INDEX: 34.17 KG/M2 | OXYGEN SATURATION: 98 %

## 2023-08-02 DIAGNOSIS — D75.839 THROMBOCYTOSIS: ICD-10-CM

## 2023-08-02 DIAGNOSIS — E78.5 DYSLIPIDEMIA: ICD-10-CM

## 2023-08-02 DIAGNOSIS — E11.42 CONTROLLED TYPE 2 DIABETES MELLITUS WITH DIABETIC POLYNEUROPATHY, WITHOUT LONG-TERM CURRENT USE OF INSULIN (HCC): Primary | ICD-10-CM

## 2023-08-02 PROBLEM — R35.1 NOCTURIA: Status: RESOLVED | Noted: 2018-09-18 | Resolved: 2023-08-02

## 2023-08-02 PROCEDURE — G8417 CALC BMI ABV UP PARAM F/U: HCPCS | Performed by: NURSE PRACTITIONER

## 2023-08-02 PROCEDURE — 4004F PT TOBACCO SCREEN RCVD TLK: CPT | Performed by: NURSE PRACTITIONER

## 2023-08-02 PROCEDURE — 99214 OFFICE O/P EST MOD 30 MIN: CPT | Performed by: NURSE PRACTITIONER

## 2023-08-02 PROCEDURE — 2022F DILAT RTA XM EVC RTNOPTHY: CPT | Performed by: NURSE PRACTITIONER

## 2023-08-02 PROCEDURE — G8427 DOCREV CUR MEDS BY ELIG CLIN: HCPCS | Performed by: NURSE PRACTITIONER

## 2023-08-02 PROCEDURE — 3051F HG A1C>EQUAL 7.0%<8.0%: CPT | Performed by: NURSE PRACTITIONER

## 2023-08-02 SDOH — ECONOMIC STABILITY: HOUSING INSECURITY
IN THE LAST 12 MONTHS, WAS THERE A TIME WHEN YOU DID NOT HAVE A STEADY PLACE TO SLEEP OR SLEPT IN A SHELTER (INCLUDING NOW)?: PATIENT REFUSED

## 2023-08-02 SDOH — ECONOMIC STABILITY: INCOME INSECURITY: HOW HARD IS IT FOR YOU TO PAY FOR THE VERY BASICS LIKE FOOD, HOUSING, MEDICAL CARE, AND HEATING?: PATIENT DECLINED

## 2023-08-02 SDOH — ECONOMIC STABILITY: FOOD INSECURITY: WITHIN THE PAST 12 MONTHS, THE FOOD YOU BOUGHT JUST DIDN'T LAST AND YOU DIDN'T HAVE MONEY TO GET MORE.: PATIENT DECLINED

## 2023-08-02 SDOH — ECONOMIC STABILITY: FOOD INSECURITY: WITHIN THE PAST 12 MONTHS, YOU WORRIED THAT YOUR FOOD WOULD RUN OUT BEFORE YOU GOT MONEY TO BUY MORE.: PATIENT DECLINED

## 2023-08-02 ASSESSMENT — PATIENT HEALTH QUESTIONNAIRE - PHQ9
9. THOUGHTS THAT YOU WOULD BE BETTER OFF DEAD, OR OF HURTING YOURSELF: 0
SUM OF ALL RESPONSES TO PHQ QUESTIONS 1-9: 0
4. FEELING TIRED OR HAVING LITTLE ENERGY: 0
SUM OF ALL RESPONSES TO PHQ9 QUESTIONS 1 & 2: 0
3. TROUBLE FALLING OR STAYING ASLEEP: 0
1. LITTLE INTEREST OR PLEASURE IN DOING THINGS: 0
6. FEELING BAD ABOUT YOURSELF - OR THAT YOU ARE A FAILURE OR HAVE LET YOURSELF OR YOUR FAMILY DOWN: 0
SUM OF ALL RESPONSES TO PHQ QUESTIONS 1-9: 0
2. FEELING DOWN, DEPRESSED OR HOPELESS: 0
10. IF YOU CHECKED OFF ANY PROBLEMS, HOW DIFFICULT HAVE THESE PROBLEMS MADE IT FOR YOU TO DO YOUR WORK, TAKE CARE OF THINGS AT HOME, OR GET ALONG WITH OTHER PEOPLE: 0
8. MOVING OR SPEAKING SO SLOWLY THAT OTHER PEOPLE COULD HAVE NOTICED. OR THE OPPOSITE, BEING SO FIGETY OR RESTLESS THAT YOU HAVE BEEN MOVING AROUND A LOT MORE THAN USUAL: 0
SUM OF ALL RESPONSES TO PHQ QUESTIONS 1-9: 0
SUM OF ALL RESPONSES TO PHQ QUESTIONS 1-9: 0
5. POOR APPETITE OR OVEREATING: 0
7. TROUBLE CONCENTRATING ON THINGS, SUCH AS READING THE NEWSPAPER OR WATCHING TELEVISION: 0

## 2023-08-02 ASSESSMENT — ENCOUNTER SYMPTOMS
SORE THROAT: 0
VISUAL CHANGE: 0
NAUSEA: 0
BACK PAIN: 0
ABDOMINAL PAIN: 0
RHINORRHEA: 0
DIARRHEA: 0
SHORTNESS OF BREATH: 0
VOMITING: 0
WHEEZING: 0
COUGH: 0
CONSTIPATION: 0

## 2023-08-02 NOTE — PROGRESS NOTES
Component Value Date/Time     07/27/2023 01:34 PM    K 4.5 07/27/2023 01:34 PM    CL 99 07/27/2023 01:34 PM    CO2 25 07/27/2023 01:34 PM    BUN 11 07/27/2023 01:34 PM    CREATININE 0.5 07/27/2023 01:34 PM    GLUCOSE 107 07/27/2023 01:34 PM    PROT 7.3 11/20/2019 08:53 AM    LABALBU 3.9 11/20/2019 08:53 AM    BILITOT <0.10 11/20/2019 08:53 AM    ALKPHOS 86 11/20/2019 08:53 AM    AST 17 07/27/2023 01:34 PM    ALT 21 07/27/2023 01:34 PM     Lab Results   Component Value Date/Time    WBC 8.2 07/27/2023 01:34 PM    RBC 4.73 07/27/2023 01:34 PM    HGB 14.0 07/27/2023 01:34 PM    HCT 44.7 07/27/2023 01:34 PM    MCV 94.5 07/27/2023 01:34 PM    MCH 29.6 07/27/2023 01:34 PM    MCHC 31.3 07/27/2023 01:34 PM    RDW 13.0 07/27/2023 01:34 PM     07/27/2023 01:34 PM    MPV 9.0 07/27/2023 01:34 PM     Lab Results   Component Value Date/Time    TSH 1.86 06/10/2021 05:07 PM     Lab Results   Component Value Date/Time    CHOL 137 07/27/2023 01:34 PM    HDL 40 07/27/2023 01:34 PM    LABA1C 7.3 07/27/2023 01:34 PM       Assessment/Plan:      Diagnosis Orders   1. Controlled type 2 diabetes mellitus with diabetic polyneuropathy, without long-term current use of insulin (HCC)  Hemoglobin S2J    Basic Metabolic Panel      2. Dyslipidemia        3. Thrombocytosis  Lizzie Barros MD, Hematology/Oncology, [place]        Continue all current medications. A1c is improving. Labs are stable other than thrombocytosis. We will have her see hematology for evaluation. Continue healthy lifestyle including diet and exercise. We will see her back in 6 months with routine check, sooner if any problems. 1.  Annye Salkum received counseling on the following healthy behaviors: nutrition, exercise, and medication adherence  2. Patient given educational materials - see patient instructions  3. Was a self-tracking handout given in paper form or via [employer]? No  If yes, see orders or list here.   4.  Discussed use, benefit,

## 2023-08-02 NOTE — PATIENT INSTRUCTIONS
SURVEY:     You may be receiving a survey from [employer] regarding your visit today. Please complete the survey to enable us to provide the highest quality of care to you and your family. If you cannot score us a very good on any question, please call the office to discuss how we could have made your experience a very good one.      Thank you,    Devonte Johnson, APRN-CNP  Adrianne Garrett, APRN-CNP  Alejandra Cody, SELMAN  Nahum Oconnell, CMA  Veronica Maza, CMA  Nicki, CMA  Adriana, PCA  Shelby, PM

## 2023-08-19 DIAGNOSIS — F41.9 ANXIETY AND DEPRESSION: ICD-10-CM

## 2023-08-19 DIAGNOSIS — F32.A ANXIETY AND DEPRESSION: ICD-10-CM

## 2023-08-21 ENCOUNTER — OFFICE VISIT (OUTPATIENT)
Dept: ONCOLOGY | Age: 38
End: 2023-08-21
Payer: COMMERCIAL

## 2023-08-21 ENCOUNTER — HOSPITAL ENCOUNTER (OUTPATIENT)
Age: 38
Discharge: HOME | End: 2023-08-21
Payer: COMMERCIAL

## 2023-08-21 VITALS
SYSTOLIC BLOOD PRESSURE: 100 MMHG | RESPIRATION RATE: 16 BRPM | BODY MASS INDEX: 35.51 KG/M2 | TEMPERATURE: 98.6 F | HEART RATE: 74 BPM | DIASTOLIC BLOOD PRESSURE: 70 MMHG | WEIGHT: 220 LBS

## 2023-08-21 DIAGNOSIS — N92.0 MENORRHAGIA WITH REGULAR CYCLE: ICD-10-CM

## 2023-08-21 DIAGNOSIS — D75.839 THROMBOCYTOSIS: Primary | ICD-10-CM

## 2023-08-21 LAB
BASOPHILS # BLD: 0.1 K/UL (ref 0–0.2)
BASOPHILS NFR BLD: 1 % (ref 0–2)
EOSINOPHIL # BLD: 0.52 K/UL (ref 0–0.44)
EOSINOPHILS RELATIVE PERCENT: 6 % (ref 1–4)
ERYTHROCYTE [DISTWIDTH] IN BLOOD BY AUTOMATED COUNT: 12.7 % (ref 11.8–14.4)
FERRITIN SERPL-MCNC: 22 NG/ML (ref 13–150)
FOLATE SERPL-MCNC: 16 NG/ML
HCT VFR BLD AUTO: 40.8 % (ref 36.3–47.1)
HGB BLD-MCNC: 13 G/DL (ref 11.9–15.1)
IMM GRANULOCYTES # BLD AUTO: 0.04 K/UL (ref 0–0.3)
IMM GRANULOCYTES NFR BLD: 1 %
IMM RETICS NFR: 27.7 % (ref 2.7–18.3)
IRON SATN MFR SERPL: 19 % (ref 20–55)
IRON SERPL-MCNC: 75 UG/DL (ref 37–145)
LYMPHOCYTES NFR BLD: 2.76 K/UL (ref 1.1–3.7)
LYMPHOCYTES RELATIVE PERCENT: 33 % (ref 24–43)
MCH RBC QN AUTO: 29.9 PG (ref 25.2–33.5)
MCHC RBC AUTO-ENTMCNC: 31.9 G/DL (ref 28.4–34.8)
MCV RBC AUTO: 93.8 FL (ref 82.6–102.9)
MONOCYTES NFR BLD: 0.44 K/UL (ref 0.1–1.2)
MONOCYTES NFR BLD: 5 % (ref 3–12)
NEUTROPHILS NFR BLD: 54 % (ref 36–65)
NEUTS SEG NFR BLD: 4.49 K/UL (ref 1.5–8.1)
NRBC BLD-RTO: 0 PER 100 WBC
PLATELET # BLD AUTO: 525 K/UL (ref 138–453)
PMV BLD AUTO: 8.4 FL (ref 8.1–13.5)
RBC # BLD AUTO: 4.35 M/UL (ref 3.95–5.11)
RETIC HEMOGLOBIN: 32.6 PG (ref 28.2–35.7)
RETICS # AUTO: 0.09 M/UL (ref 0.03–0.08)
RETICS/RBC NFR AUTO: 2.2 % (ref 0.5–1.9)
TIBC SERPL-MCNC: 388 UG/DL (ref 250–450)
UNSATURATED IRON BINDING CAPACITY: 313 UG/DL (ref 112–347)
VIT B12 SERPL-MCNC: 402 PG/ML (ref 232–1245)
WBC OTHER # BLD: 8.4 K/UL (ref 3.5–11.3)

## 2023-08-21 PROCEDURE — 82607 VITAMIN B-12: CPT

## 2023-08-21 PROCEDURE — 85025 COMPLETE CBC W/AUTO DIFF WBC: CPT

## 2023-08-21 PROCEDURE — G8417 CALC BMI ABV UP PARAM F/U: HCPCS | Performed by: INTERNAL MEDICINE

## 2023-08-21 PROCEDURE — 4004F PT TOBACCO SCREEN RCVD TLK: CPT | Performed by: INTERNAL MEDICINE

## 2023-08-21 PROCEDURE — 36415 COLL VENOUS BLD VENIPUNCTURE: CPT

## 2023-08-21 PROCEDURE — 82746 ASSAY OF FOLIC ACID SERUM: CPT

## 2023-08-21 PROCEDURE — 85045 AUTOMATED RETICULOCYTE COUNT: CPT

## 2023-08-21 PROCEDURE — 83540 ASSAY OF IRON: CPT

## 2023-08-21 PROCEDURE — 83550 IRON BINDING TEST: CPT

## 2023-08-21 PROCEDURE — 99204 OFFICE O/P NEW MOD 45 MIN: CPT | Performed by: INTERNAL MEDICINE

## 2023-08-21 PROCEDURE — G8427 DOCREV CUR MEDS BY ELIG CLIN: HCPCS | Performed by: INTERNAL MEDICINE

## 2023-08-21 PROCEDURE — 82728 ASSAY OF FERRITIN: CPT

## 2023-08-21 RX ORDER — VENLAFAXINE HYDROCHLORIDE 150 MG/1
CAPSULE, EXTENDED RELEASE ORAL
Qty: 90 CAPSULE | Refills: 1 | Status: SHIPPED | OUTPATIENT
Start: 2023-08-21

## 2023-08-21 RX ORDER — VENLAFAXINE HYDROCHLORIDE 75 MG/1
CAPSULE, EXTENDED RELEASE ORAL
Qty: 90 CAPSULE | Refills: 1 | Status: SHIPPED | OUTPATIENT
Start: 2023-08-21

## 2023-08-21 NOTE — TELEPHONE ENCOUNTER
Health Maintenance   Topic Date Due    Diabetic retinal exam  03/29/2019    Diabetic foot exam  01/27/2024 (Originally 12/1/2021)    Hepatitis B vaccine (1 of 3 - Risk 3-dose series) 01/27/2024 (Originally 9/2/2006)    DTaP/Tdap/Td vaccine (2 - Tdap) 01/27/2024 (Originally 6/28/2015)    COVID-19 Vaccine (4 - Booster for Pfizer series) 01/27/2024 (Originally 3/27/2022)    Flu vaccine (1) 06/30/2024 (Originally 8/1/2023)    Cervical cancer screen  08/02/2024 (Originally 9/2/2008)    A1C test (Diabetic or Prediabetic)  07/27/2024    Diabetic Alb to Cr ratio (uACR) test  07/27/2024    Lipids  07/27/2024    GFR test (Diabetes, CKD 3-4, OR last GFR 15-59)  07/27/2024    Depression Monitoring  08/02/2024    Pneumococcal 0-64 years Vaccine  Completed    Hepatitis C screen  Completed    HIV screen  Completed    Hepatitis A vaccine  Aged Out    Hib vaccine  Aged Out    Meningococcal (ACWY) vaccine  Aged Out    Varicella vaccine  Discontinued             (applicable per patient's age: Cancer Screenings, Depression Screening, Fall Risk Screening, Immunizations)    Hemoglobin A1C (%)   Date Value   07/27/2023 7.3 (H)   01/27/2023 8.0   07/26/2022 6.3 (H)     LDL Cholesterol (mg/dL)   Date Value   07/27/2023 74     AST (U/L)   Date Value   07/27/2023 17     ALT (U/L)   Date Value   07/27/2023 21     BUN (mg/dL)   Date Value   07/27/2023 11      (goal A1C is < 7)   (goal LDL is <100) need 30-50% reduction from baseline     BP Readings from Last 3 Encounters:   08/02/23 124/82   05/23/23 122/72   01/27/23 118/74    (goal /80)      All Future Testing planned in [facility]:  Lab Frequency Next Occurrence   Hemoglobin A1C Once 18/10/3462   Basic Metabolic Panel Once 26/97/5414       Next Visit Date:  Future Appointments   Date Time Provider [address]   8/21/2023  3:15 PM Sang Brannon MD [facility]   2/5/2024  1:00 PM PatiNINA Fontenot - CNP [facility]            Patient Active Problem List:

## 2023-08-21 NOTE — PROGRESS NOTES
RDW 13.0 07/27/2023    NEUTOPHILPCT 57 07/27/2023    MONOPCT 6 07/27/2023    BASOPCT 1 07/27/2023    NEUTROABS 4.60 07/27/2023    LYMPHSABS 2.48 07/27/2023    MONOSABS 0.47 07/27/2023    EOSABS 0.47 (H) 07/27/2023    BASOSABS 0.10 07/27/2023         Chemistry        Component Value Date/Time     07/27/2023 1334    K 4.5 07/27/2023 1334    CL 99 07/27/2023 1334    CO2 25 07/27/2023 1334    BUN 11 07/27/2023 1334    CREATININE 0.5 07/27/2023 1334        Component Value Date/Time    CALCIUM 9.3 07/27/2023 1334    ALKPHOS 86 11/20/2019 0853    AST 17 07/27/2023 1334    ALT 21 07/27/2023 1334    BILITOT <0.10 (L) 11/20/2019 0853            PATHOLOGY DATA:         IMAGING DATA:      [facility] CINDY DIGITAL DIAGNOSTIC UNILATERAL RIGHT  Narrative: EXAMINATION:  DIAGNOSTIC DIGITAL RIGHT BREAST MAMMOGRAM WITH TOMOSYNTHESIS, 8/2/2021 2:58 pm    TECHNIQUE:  Diagnostic mammography of the right breast was performed with tomosynthesis. 2D standard and 3D tomosynthesis combination imaging performed through the  right breast.  Computer aided detection was utilized in the interpretation of  this exam.    Views: Craniocaudal, 90 degree lateral and mediolateral oblique mammograms of  the right breast.    COMPARISON:  4 December 2020    HISTORY:  ORDERING SYSTEM PROVIDED HISTORY: Abnormal mammogram of right breast  TECHNOLOGIST PROVIDED HISTORY:  Is the patient pregnant?->No    FINDINGS:  There are scattered areas of fibroglandular density. Again visualized is regional asymmetry within the upper outer middle 3rd of  the right breast, unchanged. No masses, suspicious calcifications, foci of  architectural distortion or other significant interval changes are detected. Impression: Right breast regional asymmetry demonstrates nearly 8 months of stability and  continues to be classified as a probably benign finding. BIRADS:  BIRADS - CATEGORY 3    Probably Benign Findings.  A short interval follow-up right breast mammogram  is

## 2023-08-23 LAB
PATH REV BLD -IMP: NORMAL
SURGICAL PATHOLOGY REPORT: NORMAL

## 2023-08-31 ENCOUNTER — TELEPHONE (OUTPATIENT)
Dept: PRIMARY CARE CLINIC | Age: 38
End: 2023-08-31

## 2023-08-31 ENCOUNTER — OFFICE VISIT (OUTPATIENT)
Dept: PRIMARY CARE CLINIC | Age: 38
End: 2023-08-31
Payer: COMMERCIAL

## 2023-08-31 VITALS
HEART RATE: 78 BPM | TEMPERATURE: 98.5 F | OXYGEN SATURATION: 98 % | SYSTOLIC BLOOD PRESSURE: 118 MMHG | WEIGHT: 216.1 LBS | BODY MASS INDEX: 34.88 KG/M2 | RESPIRATION RATE: 22 BRPM | DIASTOLIC BLOOD PRESSURE: 68 MMHG

## 2023-08-31 DIAGNOSIS — K52.9 AGE (ACUTE GASTROENTERITIS): Primary | ICD-10-CM

## 2023-08-31 DIAGNOSIS — E86.0 MILD DEHYDRATION: ICD-10-CM

## 2023-08-31 PROCEDURE — G8417 CALC BMI ABV UP PARAM F/U: HCPCS | Performed by: NURSE PRACTITIONER

## 2023-08-31 PROCEDURE — 4004F PT TOBACCO SCREEN RCVD TLK: CPT | Performed by: NURSE PRACTITIONER

## 2023-08-31 PROCEDURE — 99214 OFFICE O/P EST MOD 30 MIN: CPT | Performed by: NURSE PRACTITIONER

## 2023-08-31 PROCEDURE — G8427 DOCREV CUR MEDS BY ELIG CLIN: HCPCS | Performed by: NURSE PRACTITIONER

## 2023-08-31 RX ORDER — ONDANSETRON 4 MG/1
TABLET, FILM COATED ORAL
Qty: 15 TABLET | Refills: 0 | Status: SHIPPED | OUTPATIENT
Start: 2023-08-31

## 2023-08-31 SDOH — ECONOMIC STABILITY: INCOME INSECURITY: HOW HARD IS IT FOR YOU TO PAY FOR THE VERY BASICS LIKE FOOD, HOUSING, MEDICAL CARE, AND HEATING?: PATIENT DECLINED

## 2023-08-31 SDOH — ECONOMIC STABILITY: FOOD INSECURITY: WITHIN THE PAST 12 MONTHS, YOU WORRIED THAT YOUR FOOD WOULD RUN OUT BEFORE YOU GOT MONEY TO BUY MORE.: PATIENT DECLINED

## 2023-08-31 SDOH — ECONOMIC STABILITY: FOOD INSECURITY: WITHIN THE PAST 12 MONTHS, THE FOOD YOU BOUGHT JUST DIDN'T LAST AND YOU DIDN'T HAVE MONEY TO GET MORE.: PATIENT DECLINED

## 2023-08-31 ASSESSMENT — PATIENT HEALTH QUESTIONNAIRE - PHQ9
SUM OF ALL RESPONSES TO PHQ QUESTIONS 1-9: 0
4. FEELING TIRED OR HAVING LITTLE ENERGY: 0
3. TROUBLE FALLING OR STAYING ASLEEP: 0
SUM OF ALL RESPONSES TO PHQ QUESTIONS 1-9: 0
8. MOVING OR SPEAKING SO SLOWLY THAT OTHER PEOPLE COULD HAVE NOTICED. OR THE OPPOSITE, BEING SO FIGETY OR RESTLESS THAT YOU HAVE BEEN MOVING AROUND A LOT MORE THAN USUAL: 0
6. FEELING BAD ABOUT YOURSELF - OR THAT YOU ARE A FAILURE OR HAVE LET YOURSELF OR YOUR FAMILY DOWN: 0
SUM OF ALL RESPONSES TO PHQ QUESTIONS 1-9: 0
10. IF YOU CHECKED OFF ANY PROBLEMS, HOW DIFFICULT HAVE THESE PROBLEMS MADE IT FOR YOU TO DO YOUR WORK, TAKE CARE OF THINGS AT HOME, OR GET ALONG WITH OTHER PEOPLE: 0
7. TROUBLE CONCENTRATING ON THINGS, SUCH AS READING THE NEWSPAPER OR WATCHING TELEVISION: 0
9. THOUGHTS THAT YOU WOULD BE BETTER OFF DEAD, OR OF HURTING YOURSELF: 0
1. LITTLE INTEREST OR PLEASURE IN DOING THINGS: 0
5. POOR APPETITE OR OVEREATING: 0
2. FEELING DOWN, DEPRESSED OR HOPELESS: 0
SUM OF ALL RESPONSES TO PHQ9 QUESTIONS 1 & 2: 0
SUM OF ALL RESPONSES TO PHQ QUESTIONS 1-9: 0

## 2023-08-31 NOTE — PROGRESS NOTES
voiced understanding. 5.  Reviewed prior labs and health maintenance  6. Continue current medications, diet and exercise. Completed Refills   Requested Prescriptions     Signed Prescriptions Disp Refills    ondansetron (ZOFRAN) 4 MG tablet 15 tablet 0     Sig: TAKE 1 TABLET BY MOUTH THREE TIMES DAILY AS NEEDED FOR NAUSEA FOR VOMITING         Return if symptoms worsen or fail to improve.

## 2023-08-31 NOTE — PATIENT INSTRUCTIONS
SURVEY:     You may be receiving a survey from [employer] regarding your visit today. Please complete the survey to enable us to provide the highest quality of care to you and your family. If you cannot score us a very good on any question, please call the office to discuss how we could have made your experience a very good one.      Thank you,    Nhi Johnson, APRN-LINN Nieto, APRN-CNP  Delilah Sterling, PEPE Tyler, SAJAN George, SAJAN Caceres, CMA  Adriana, RADHA Ryan, PM

## 2023-09-01 ASSESSMENT — ENCOUNTER SYMPTOMS
ABDOMINAL PAIN: 0
SWOLLEN GLANDS: 0
SHORTNESS OF BREATH: 0
NAUSEA: 1
DIARRHEA: 0
VISUAL CHANGE: 0
WHEEZING: 0
BACK PAIN: 0
CHANGE IN BOWEL HABIT: 0
COUGH: 0
VOMITING: 1
SORE THROAT: 0
RHINORRHEA: 0

## 2023-09-03 ENCOUNTER — PATIENT MESSAGE (OUTPATIENT)
Dept: PRIMARY CARE CLINIC | Age: 38
End: 2023-09-03

## 2023-09-04 DIAGNOSIS — E11.9 CONTROLLED TYPE 2 DIABETES MELLITUS WITHOUT COMPLICATION, WITHOUT LONG-TERM CURRENT USE OF INSULIN (HCC): ICD-10-CM

## 2023-09-04 RX ORDER — LISINOPRIL 2.5 MG/1
TABLET ORAL
Qty: 30 TABLET | Refills: 5 | Status: SHIPPED | OUTPATIENT
Start: 2023-09-04

## 2023-09-05 ENCOUNTER — TELEPHONE (OUTPATIENT)
Dept: PRIMARY CARE CLINIC | Age: 38
End: 2023-09-05

## 2023-09-05 DIAGNOSIS — K52.9 AGE (ACUTE GASTROENTERITIS): ICD-10-CM

## 2023-09-05 RX ORDER — ONDANSETRON 4 MG/1
TABLET, FILM COATED ORAL
Qty: 15 TABLET | Refills: 0 | Status: SHIPPED | OUTPATIENT
Start: 2023-09-05

## 2023-09-05 NOTE — TELEPHONE ENCOUNTER
Patient contacted the office in regards to still having nausea, vomiting, and diarrhea. Patient was seen in the office on 8/31/23 for this issue and still having it. Patient wondering what she can do or take. Please advise.

## 2023-09-05 NOTE — TELEPHONE ENCOUNTER
Patient requesting a note off of work with a return date of 9/7/23. Okay to write letter? Please advise.

## 2023-09-05 NOTE — TELEPHONE ENCOUNTER
I will refill Zofran and I would recommend over-the-counter Imodium as needed. If she continues to worsen though I would recommend she be seen in the ER for IV fluids. Thank you.

## 2023-09-14 ENCOUNTER — OFFICE VISIT (OUTPATIENT)
Dept: ONCOLOGY | Age: 38
End: 2023-09-14

## 2023-09-14 VITALS
DIASTOLIC BLOOD PRESSURE: 74 MMHG | BODY MASS INDEX: 34.7 KG/M2 | HEART RATE: 88 BPM | SYSTOLIC BLOOD PRESSURE: 113 MMHG | RESPIRATION RATE: 16 BRPM | WEIGHT: 215 LBS | TEMPERATURE: 97 F

## 2023-09-14 DIAGNOSIS — D75.839 THROMBOCYTOSIS: Primary | ICD-10-CM

## 2023-09-14 DIAGNOSIS — R53.83 OTHER FATIGUE: ICD-10-CM

## 2023-09-14 DIAGNOSIS — E61.1 IRON DEFICIENCY: ICD-10-CM

## 2023-09-14 RX ORDER — FERROUS SULFATE 325(65) MG
325 TABLET ORAL
Qty: 90 TABLET | Refills: 1 | Status: SHIPPED | OUTPATIENT
Start: 2023-09-14

## 2023-09-22 DIAGNOSIS — K52.9 AGE (ACUTE GASTROENTERITIS): ICD-10-CM

## 2023-09-24 RX ORDER — ONDANSETRON 4 MG/1
TABLET, FILM COATED ORAL
Qty: 15 TABLET | Refills: 0 | Status: SHIPPED | OUTPATIENT
Start: 2023-09-24

## 2023-11-10 ENCOUNTER — HOSPITAL ENCOUNTER (OUTPATIENT)
Age: 38
Discharge: HOME | End: 2023-11-10
Payer: COMMERCIAL

## 2023-11-10 DIAGNOSIS — G62.9 PERIPHERAL POLYNEUROPATHY: ICD-10-CM

## 2023-11-10 DIAGNOSIS — E61.1 IRON DEFICIENCY: ICD-10-CM

## 2023-11-10 DIAGNOSIS — D75.839 THROMBOCYTOSIS: ICD-10-CM

## 2023-11-10 LAB
BASOPHILS # BLD: 0.08 K/UL (ref 0–0.2)
BASOPHILS NFR BLD: 1 % (ref 0–2)
EOSINOPHIL # BLD: 0.38 K/UL (ref 0–0.44)
EOSINOPHILS RELATIVE PERCENT: 5 % (ref 1–4)
ERYTHROCYTE [DISTWIDTH] IN BLOOD BY AUTOMATED COUNT: 12.9 % (ref 11.8–14.4)
HCT VFR BLD AUTO: 41.2 % (ref 36.3–47.1)
HGB BLD-MCNC: 12.9 G/DL (ref 11.9–15.1)
IMM GRANULOCYTES # BLD AUTO: 0.03 K/UL (ref 0–0.3)
IMM GRANULOCYTES NFR BLD: 0 %
LYMPHOCYTES NFR BLD: 3.33 K/UL (ref 1.1–3.7)
LYMPHOCYTES RELATIVE PERCENT: 41 % (ref 24–43)
MCH RBC QN AUTO: 29.4 PG (ref 25.2–33.5)
MCHC RBC AUTO-ENTMCNC: 31.3 G/DL (ref 28.4–34.8)
MCV RBC AUTO: 93.8 FL (ref 82.6–102.9)
MONOCYTES NFR BLD: 0.43 K/UL (ref 0.1–1.2)
MONOCYTES NFR BLD: 5 % (ref 3–12)
NEUTROPHILS NFR BLD: 48 % (ref 36–65)
NEUTS SEG NFR BLD: 3.94 K/UL (ref 1.5–8.1)
NRBC BLD-RTO: 0 PER 100 WBC
PLATELET # BLD AUTO: 490 K/UL (ref 138–453)
PMV BLD AUTO: 8.4 FL (ref 8.1–13.5)
RBC # BLD AUTO: 4.39 M/UL (ref 3.95–5.11)
WBC OTHER # BLD: 8.2 K/UL (ref 3.5–11.3)

## 2023-11-10 PROCEDURE — 83550 IRON BINDING TEST: CPT

## 2023-11-10 PROCEDURE — 36415 COLL VENOUS BLD VENIPUNCTURE: CPT

## 2023-11-10 PROCEDURE — 85025 COMPLETE CBC W/AUTO DIFF WBC: CPT

## 2023-11-10 PROCEDURE — 83540 ASSAY OF IRON: CPT

## 2023-11-10 PROCEDURE — 82728 ASSAY OF FERRITIN: CPT

## 2023-11-10 RX ORDER — GABAPENTIN 300 MG/1
CAPSULE ORAL
Qty: 180 CAPSULE | Refills: 0 | Status: SHIPPED | OUTPATIENT
Start: 2023-11-10 | End: 2024-02-10

## 2023-11-10 NOTE — TELEPHONE ENCOUNTER
Health Maintenance   Topic Date Due    Pneumococcal 0-64 years Vaccine (2 - PCV) 06/02/2018    COVID-19 Vaccine (4 - 2023-24 season) 09/01/2023    Diabetic foot exam  01/27/2024 (Originally 12/1/2021)    Hepatitis B vaccine (1 of 3 - 3-dose series) 01/27/2024 (Originally 1985)    DTaP/Tdap/Td vaccine (2 - Tdap) 01/27/2024 (Originally 6/28/2015)    Flu vaccine (1) 06/30/2024 (Originally 8/1/2023)    Cervical cancer screen  08/02/2024 (Originally 9/2/2008)    Diabetic retinal exam  09/09/2024 (Originally 3/29/2019)    A1C test (Diabetic or Prediabetic)  07/27/2024    Diabetic Alb to Cr ratio (uACR) test  07/27/2024    Lipids  07/27/2024    GFR test (Diabetes, CKD 3-4, OR last GFR 15-59)  07/27/2024    Depression Monitoring  08/31/2024    Hepatitis C screen  Completed    HIV screen  Completed    Hepatitis A vaccine  Aged Out    Hib vaccine  Aged Out    HPV vaccine  Aged Out    Meningococcal (ACWY) vaccine  Aged Out    Varicella vaccine  Discontinued             (applicable per patient's age: Cancer Screenings, Depression Screening, Fall Risk Screening, Immunizations)    Hemoglobin A1C (%)   Date Value   07/27/2023 7.3 (H)   01/27/2023 8.0   07/26/2022 6.3 (H)     LDL Cholesterol (mg/dL)   Date Value   07/27/2023 74     AST (U/L)   Date Value   07/27/2023 17     ALT (U/L)   Date Value   07/27/2023 21     BUN (mg/dL)   Date Value   07/27/2023 11      (goal A1C is < 7)   (goal LDL is <100) need 30-50% reduction from baseline     BP Readings from Last 3 Encounters:   09/14/23 113/74   08/31/23 118/68   08/21/23 100/70    (goal /80)      All Future Testing planned in [facility]:  Lab Frequency Next Occurrence   Hemoglobin A1C Once 60/17/3396   Basic Metabolic Panel Once 54/75/1048   Iron and TIBC Once 09/14/2023   Ferritin Once 09/14/2023   CBC with Auto Differential Once 09/21/2023       Next Visit Date:  Future Appointments   Date Time Provider [address]   11/13/2023  4:30 PM Russella Goodell, MD [facility]

## 2023-11-11 LAB
FERRITIN SERPL-MCNC: 21 NG/ML (ref 13–150)
IRON SATN MFR SERPL: 22 % (ref 20–55)
IRON SERPL-MCNC: 82 UG/DL (ref 37–145)
TIBC SERPL-MCNC: 378 UG/DL (ref 250–450)
UNSATURATED IRON BINDING CAPACITY: 296 UG/DL (ref 112–347)

## 2023-11-18 DIAGNOSIS — E55.9 VITAMIN D DEFICIENCY: ICD-10-CM

## 2023-11-19 RX ORDER — CHOLECALCIFEROL (VITAMIN D3) 25 MCG
CAPSULE ORAL
Qty: 180 CAPSULE | Refills: 1 | Status: SHIPPED | OUTPATIENT
Start: 2023-11-19

## 2023-12-03 DIAGNOSIS — F51.01 PRIMARY INSOMNIA: ICD-10-CM

## 2023-12-04 RX ORDER — AMITRIPTYLINE HYDROCHLORIDE 50 MG/1
50 TABLET, FILM COATED ORAL NIGHTLY
Qty: 90 TABLET | Refills: 1 | Status: SHIPPED | OUTPATIENT
Start: 2023-12-04

## 2023-12-04 RX ORDER — ISOPROPYL ALCOHOL 0.75 G/1
SWAB TOPICAL
Qty: 100 EACH | Refills: 3 | Status: SHIPPED | OUTPATIENT
Start: 2023-12-04

## 2023-12-04 NOTE — TELEPHONE ENCOUNTER
Health Maintenance   Topic Date Due    Pneumococcal 0-64 years Vaccine (2 - PCV) 06/02/2018    COVID-19 Vaccine (4 - 2023-24 season) 09/01/2023    Diabetic foot exam  01/27/2024 (Originally 12/1/2021)    Hepatitis B vaccine (1 of 3 - 3-dose series) 01/27/2024 (Originally 1985)    DTaP/Tdap/Td vaccine (2 - Tdap) 01/27/2024 (Originally 6/28/2015)    Flu vaccine (1) 06/30/2024 (Originally 8/1/2023)    Cervical cancer screen  08/02/2024 (Originally 9/2/2008)    Diabetic retinal exam  09/09/2024 (Originally 3/29/2019)    A1C test (Diabetic or Prediabetic)  07/27/2024    Diabetic Alb to Cr ratio (uACR) test  07/27/2024    Lipids  07/27/2024    GFR test (Diabetes, CKD 3-4, OR last GFR 15-59)  07/27/2024    Depression Monitoring  08/31/2024    Hepatitis C screen  Completed    HIV screen  Completed    Hepatitis A vaccine  Aged Out    Hib vaccine  Aged Out    HPV vaccine  Aged Out    Meningococcal (ACWY) vaccine  Aged Out    Varicella vaccine  Discontinued             (applicable per patient's age: Cancer Screenings, Depression Screening, Fall Risk Screening, Immunizations)    Hemoglobin A1C (%)   Date Value   07/27/2023 7.3 (H)   01/27/2023 8.0   07/26/2022 6.3 (H)     LDL Cholesterol (mg/dL)   Date Value   07/27/2023 74     AST (U/L)   Date Value   07/27/2023 17     ALT (U/L)   Date Value   07/27/2023 21     BUN (mg/dL)   Date Value   07/27/2023 11      (goal A1C is < 7)   (goal LDL is <100) need 30-50% reduction from baseline     BP Readings from Last 3 Encounters:   09/14/23 113/74   08/31/23 118/68   08/21/23 100/70    (goal /80)      All Future Testing planned in [facility]:  Lab Frequency Next Occurrence   Hemoglobin A1C Once 01/29/2024   Basic Metabolic Panel Once 02/02/2024       Next Visit Date:  Future Appointments   Date Time Provider Department Center   1/11/2024  4:15 PM Bebo Calzada MD [facility]   2/5/2024  1:00 PM Silvio Johnson, NINA - CNP [facility]            Patient Active

## 2024-01-02 ENCOUNTER — HOSPITAL ENCOUNTER (EMERGENCY)
Age: 39
Discharge: HOME | End: 2024-01-02
Attending: EMERGENCY MEDICINE
Payer: COMMERCIAL

## 2024-01-02 VITALS
HEART RATE: 81 BPM | DIASTOLIC BLOOD PRESSURE: 87 MMHG | OXYGEN SATURATION: 98 % | SYSTOLIC BLOOD PRESSURE: 134 MMHG | WEIGHT: 213 LBS | RESPIRATION RATE: 16 BRPM | TEMPERATURE: 97.5 F | BODY MASS INDEX: 34.38 KG/M2

## 2024-01-02 DIAGNOSIS — J02.0 ACUTE STREPTOCOCCAL PHARYNGITIS: Primary | ICD-10-CM

## 2024-01-02 LAB
FLUAV AG SPEC QL: NEGATIVE
FLUBV AG SPEC QL: NEGATIVE
SARS-COV-2 RDRP RESP QL NAA+PROBE: NOT DETECTED
SPECIMEN DESCRIPTION: NORMAL
SPECIMEN SOURCE: ABNORMAL
STREP A, MOLECULAR: POSITIVE

## 2024-01-02 PROCEDURE — 87804 INFLUENZA ASSAY W/OPTIC: CPT

## 2024-01-02 PROCEDURE — 87635 SARS-COV-2 COVID-19 AMP PRB: CPT

## 2024-01-02 PROCEDURE — 99283 EMERGENCY DEPT VISIT LOW MDM: CPT

## 2024-01-02 RX ORDER — PENICILLIN V POTASSIUM 500 MG/1
500 TABLET ORAL 3 TIMES DAILY
Qty: 21 TABLET | Refills: 0 | Status: SHIPPED | OUTPATIENT
Start: 2024-01-02 | End: 2024-01-09

## 2024-01-02 ASSESSMENT — LIFESTYLE VARIABLES
HOW MANY STANDARD DRINKS CONTAINING ALCOHOL DO YOU HAVE ON A TYPICAL DAY: PATIENT DOES NOT DRINK
HOW OFTEN DO YOU HAVE A DRINK CONTAINING ALCOHOL: NEVER

## 2024-01-02 ASSESSMENT — PAIN - FUNCTIONAL ASSESSMENT: PAIN_FUNCTIONAL_ASSESSMENT: 0-10

## 2024-01-02 NOTE — ED PROVIDER NOTES
[facility] ED      EMERGENCY MEDICINE     Pt Name: Lilly Butler  MRN: [id number]  Birthdate 1985  Date of evaluation: 1/2/2024  Provider: JACOB MUELLER DO, FACEP    CHIEF COMPLAINT       Chief Complaint   Patient presents with    Cough     Cough, chills, sore throat, painful to swallow, and body aches since Friday.       HISTORY OF PRESENT ILLNESS   Lilly Butler is a pleasant 38 y.o. female who presents to the emergency department for evaluation of a sore throat, myalgias, chills and mild cough.  Cough now improved. Symptoms x 5 days. No headache, no stiff or painful neck. Not sure about fever.      PASTMEDICAL HISTORY/Co-Morbid Conditions:     Past Medical History:   Diagnosis Date    Acute bronchitis with asthma 06/19/2020    Anxiety and depression     Bipolar disorder ([facility])     Chronic back pain     Depression     Dyslipidemia 09/04/2012    Female stress incontinence 11/23/2015    Neuropathy     Obesity     Restless legs syndrome     Type 2 diabetes mellitus without complication ([facility])     Type II or unspecified type diabetes mellitus without mention of complication, not stated as uncontrolled     Wears glasses        Patient Active Problem List   Diagnosis Code    Dyslipidemia E78.5    Insomnia G47.00    Anxiety and depression F41.9, F32.A    Adopted Z02.82    Urinary urgency R39.15    Female stress incontinence N39.3    Controlled type 2 diabetes mellitus with diabetic polyneuropathy, without long-term current use of insulin ([facility]) E11.42    Frequency of urination R35.0    Mixed incontinence N39.46    Acute bronchitis with asthma J20.9, J45.909    Thrombocytosis D75.839    Menorrhagia with regular cycle N92.0    Iron deficiency E61.1    Other fatigue R53.83     SURGICAL HISTORY       Past Surgical History:   Procedure Laterality Date    TUBAL LIGATION      WISDOM TOOTH EXTRACTION         CURRENT MEDICATIONS       Previous Medications    ALCOHOL SWABS (B-D SINGLE USE SWABS REGULAR)

## 2024-01-02 NOTE — DISCHARGE INSTRUCTIONS
Return to the Emergency Department immediately if you develop worsening sore throat, difficulty swallowing, pain, neck pain or neck stiffness, high fevers, shortness of breath , or you have any other concerns.  Please follow up with your primary care doctor in 2-3 days.

## 2024-01-11 ENCOUNTER — OFFICE VISIT (OUTPATIENT)
Dept: ONCOLOGY | Age: 39
End: 2024-01-11
Payer: COMMERCIAL

## 2024-01-11 VITALS
RESPIRATION RATE: 18 BRPM | SYSTOLIC BLOOD PRESSURE: 123 MMHG | DIASTOLIC BLOOD PRESSURE: 75 MMHG | HEART RATE: 82 BPM | TEMPERATURE: 98.2 F | WEIGHT: 204 LBS | BODY MASS INDEX: 32.93 KG/M2

## 2024-01-11 DIAGNOSIS — E61.1 IRON DEFICIENCY: ICD-10-CM

## 2024-01-11 DIAGNOSIS — D75.839 THROMBOCYTOSIS: Primary | ICD-10-CM

## 2024-01-11 DIAGNOSIS — Z82.49 FAMILY HISTORY OF PULMONARY EMBOLISM: ICD-10-CM

## 2024-01-11 DIAGNOSIS — N92.0 MENORRHAGIA WITH REGULAR CYCLE: ICD-10-CM

## 2024-01-11 PROCEDURE — G8484 FLU IMMUNIZE NO ADMIN: HCPCS | Performed by: INTERNAL MEDICINE

## 2024-01-11 PROCEDURE — 99214 OFFICE O/P EST MOD 30 MIN: CPT | Performed by: INTERNAL MEDICINE

## 2024-01-11 PROCEDURE — G8427 DOCREV CUR MEDS BY ELIG CLIN: HCPCS | Performed by: INTERNAL MEDICINE

## 2024-01-11 PROCEDURE — 4004F PT TOBACCO SCREEN RCVD TLK: CPT | Performed by: INTERNAL MEDICINE

## 2024-01-11 PROCEDURE — G8417 CALC BMI ABV UP PARAM F/U: HCPCS | Performed by: INTERNAL MEDICINE

## 2024-01-11 NOTE — PROGRESS NOTES
Patient ID: Lilly Butler, 1985, [id number], 38 y.o.  Referred by :  No ref. provider found   Reason for consultation: Thrombocytosis      HISTORY OF PRESENT ILLNESS:    Oncologic History:    Lilly Butler is a very pleasant 38 y.o. female.  History of obesity/metabolic syndrome referred for persistent thrombocytosis, platelet count 467 in July 2022 and advised to 511 on July 23, hemoglobin and WBC in the normal limits  She does not have heavy menstrual cycles but regular      Interval history    Molecular study negative for MPN  Iron panel corrected and platelets still elevated  Reported her mother had factor V/thrombophilia was thromboembolism  Fatigue      Past Medical History:   Diagnosis Date    Acute bronchitis with asthma 06/19/2020    Anxiety and depression     Bipolar disorder ([facility])     Chronic back pain     Depression     Dyslipidemia 09/04/2012    Female stress incontinence 11/23/2015    Neuropathy     Obesity     Restless legs syndrome     Type 2 diabetes mellitus without complication ([facility])     Type II or unspecified type diabetes mellitus without mention of complication, not stated as uncontrolled     Wears glasses        Past Surgical History:   Procedure Laterality Date    TUBAL LIGATION      WISDOM TOOTH EXTRACTION         No Known Allergies    Current Outpatient Medications   Medication Sig Dispense Refill    JARDIANCE 25 MG tablet Take 1 tablet by mouth once daily 90 tablet 1    Alcohol Swabs (B-D SINGLE USE SWABS REGULAR) PADS USE 1 SWAB EXTERNALLY ONCE DAILY AND AS NEEDED FOR  SYMPTOMS  OF  IRREGULAR  BLOOD  GLUCOSE. 100 each 3    amitriptyline (ELAVIL) 50 MG tablet Take 1 tablet by mouth nightly 90 tablet 1    metFORMIN (GLUCOPHAGE) 1000 MG tablet TAKE 1 TABLET BY MOUTH TWICE DAILY WITH MEALS 180 tablet 1    Cholecalciferol (VITAMIN D-3) 25 MCG (1000 UT) CAPS Take 2 capsules by mouth once daily 180 capsule 1    gabapentin (NEURONTIN) 300 MG capsule Take 1 capsule by mouth twice

## 2024-01-24 ENCOUNTER — HOSPITAL ENCOUNTER (OUTPATIENT)
Dept: CT IMAGING | Age: 39
Discharge: HOME | End: 2024-01-26
Payer: COMMERCIAL

## 2024-01-24 VITALS
TEMPERATURE: 97.3 F | RESPIRATION RATE: 19 BRPM | DIASTOLIC BLOOD PRESSURE: 76 MMHG | HEART RATE: 67 BPM | SYSTOLIC BLOOD PRESSURE: 126 MMHG | OXYGEN SATURATION: 100 %

## 2024-01-24 DIAGNOSIS — Z82.49 FAMILY HISTORY OF PULMONARY EMBOLISM: ICD-10-CM

## 2024-01-24 DIAGNOSIS — D75.839 THROMBOCYTOSIS: ICD-10-CM

## 2024-01-24 LAB
BASOPHILS # BLD: 0.06 K/UL (ref 0–0.2)
BASOPHILS NFR BLD: 1 % (ref 0–2)
CARDIOLIPIN IGA SER IA-ACNC: ABNORMAL APL
CARDIOLIPIN IGG SER IA-ACNC: ABNORMAL GPL
CARDIOLIPIN IGM SER IA-ACNC: ABNORMAL MPL
DILUTE RUSSELL VIPER VENOM TIME: ABNORMAL
EOSINOPHIL # BLD: 0.26 K/UL (ref 0–0.44)
EOSINOPHILS RELATIVE PERCENT: 4 % (ref 1–4)
ERYTHROCYTE [DISTWIDTH] IN BLOOD BY AUTOMATED COUNT: 12.7 % (ref 11.8–14.4)
HCT VFR BLD AUTO: 43.5 % (ref 36.3–47.1)
HGB BLD-MCNC: 13.9 G/DL (ref 11.9–15.1)
IMM GRANULOCYTES # BLD AUTO: 0.03 K/UL (ref 0–0.3)
IMM GRANULOCYTES NFR BLD: 1 %
IMM RETICS NFR: 16.7 % (ref 2.7–18.3)
INR PPP: 0.9
LUPUS ANTICOAG: ABNORMAL
LYMPHOCYTES NFR BLD: 2.33 K/UL (ref 1.1–3.7)
LYMPHOCYTES RELATIVE PERCENT: 36 % (ref 24–43)
MCH RBC QN AUTO: 30.3 PG (ref 25.2–33.5)
MCHC RBC AUTO-ENTMCNC: 32 G/DL (ref 28.4–34.8)
MCV RBC AUTO: 95 FL (ref 82.6–102.9)
MONOCYTES NFR BLD: 0.35 K/UL (ref 0.1–1.2)
MONOCYTES NFR BLD: 5 % (ref 3–12)
NEUTROPHILS NFR BLD: 54 % (ref 36–65)
NEUTS SEG NFR BLD: 3.54 K/UL (ref 1.5–8.1)
NRBC BLD-RTO: 0 PER 100 WBC
PARTIAL THROMBOPLASTIN TIME: 24.4 SEC (ref 26.8–34.8)
PLATELET # BLD AUTO: 512 K/UL (ref 138–453)
PMV BLD AUTO: 8.8 FL (ref 8.1–13.5)
PROTHROMBIN TIME: 12.5 SEC (ref 11.9–14.8)
RBC # BLD AUTO: 4.58 M/UL (ref 3.95–5.11)
RETIC HEMOGLOBIN: 33.8 PG (ref 28.2–35.7)
RETICS # AUTO: 0.1 M/UL (ref 0.03–0.08)
RETICS/RBC NFR AUTO: 2.2 % (ref 0.5–1.9)
WBC OTHER # BLD: 6.6 K/UL (ref 3.5–11.3)

## 2024-01-24 PROCEDURE — 85610 PROTHROMBIN TIME: CPT

## 2024-01-24 PROCEDURE — 36415 COLL VENOUS BLD VENIPUNCTURE: CPT

## 2024-01-24 PROCEDURE — 85025 COMPLETE CBC W/AUTO DIFF WBC: CPT

## 2024-01-24 PROCEDURE — 85305 CLOT INHIBIT PROT S TOTAL: CPT

## 2024-01-24 PROCEDURE — 85300 ANTITHROMBIN III ACTIVITY: CPT

## 2024-01-24 PROCEDURE — 85240 CLOT FACTOR VIII AHG 1 STAGE: CPT

## 2024-01-24 PROCEDURE — 85306 CLOT INHIBIT PROT S FREE: CPT

## 2024-01-24 PROCEDURE — 88280 CHROMOSOME KARYOTYPE STUDY: CPT

## 2024-01-24 PROCEDURE — 81241 F5 GENE: CPT

## 2024-01-24 PROCEDURE — 2500000003 HC RX 250 WO HCPCS: Performed by: RADIOLOGY

## 2024-01-24 PROCEDURE — 81240 F2 GENE: CPT

## 2024-01-24 PROCEDURE — 85613 RUSSELL VIPER VENOM DILUTED: CPT

## 2024-01-24 PROCEDURE — 88237 TISSUE CULTURE BONE MARROW: CPT

## 2024-01-24 PROCEDURE — 86147 CARDIOLIPIN ANTIBODY EA IG: CPT

## 2024-01-24 PROCEDURE — 77012 CT SCAN FOR NEEDLE BIOPSY: CPT

## 2024-01-24 PROCEDURE — 85045 AUTOMATED RETICULOCYTE COUNT: CPT

## 2024-01-24 PROCEDURE — 88264 CHROMOSOME ANALYSIS 20-25: CPT

## 2024-01-24 PROCEDURE — 85730 THROMBOPLASTIN TIME PARTIAL: CPT

## 2024-01-24 PROCEDURE — 85303 CLOT INHIBIT PROT C ACTIVITY: CPT

## 2024-01-24 RX ORDER — LIDOCAINE HYDROCHLORIDE 10 MG/ML
INJECTION, SOLUTION EPIDURAL; INFILTRATION; INTRACAUDAL; PERINEURAL PRN
Status: COMPLETED | OUTPATIENT
Start: 2024-01-24 | End: 2024-01-24

## 2024-01-24 RX ADMIN — LIDOCAINE HYDROCHLORIDE 5 ML: 10 INJECTION, SOLUTION EPIDURAL; INFILTRATION; INTRACAUDAL; PERINEURAL at 09:33

## 2024-01-24 NOTE — PROGRESS NOTES
All discharge instructions given with spouse at bedside. All questions answered at this time. All belongings returned. PIV removed per protocol.

## 2024-01-24 NOTE — OR NURSING
Core specimen obtained by doctor and placed in formalin. Pressure applied to biopsy site. Patient tolerated procedure well.

## 2024-01-24 NOTE — OR NURSING
imaging completed and reviewed by doctor. Right lower back/ buttock area prepped and draped in sterile fashion by doctor.

## 2024-01-26 LAB — PROT S AG ACT/NOR PPP IA: 119 % (ref 63–126)

## 2024-01-27 LAB
F2 C.20210G>A GENO BLD/T: NEGATIVE
F5 P.R506Q BLD/T QL: NEGATIVE
SPECIMEN SOURCE: NORMAL
SPECIMEN SOURCE: NORMAL

## 2024-01-29 DIAGNOSIS — F41.9 ANXIETY AND DEPRESSION: ICD-10-CM

## 2024-01-29 DIAGNOSIS — E78.5 DYSLIPIDEMIA: ICD-10-CM

## 2024-01-29 DIAGNOSIS — F32.A ANXIETY AND DEPRESSION: ICD-10-CM

## 2024-01-29 DIAGNOSIS — G62.9 PERIPHERAL POLYNEUROPATHY: ICD-10-CM

## 2024-01-29 LAB
BONE MARROW REPORT: NORMAL
CHROMOSOME STUDY: NORMAL
FLOW CYTOMETRY, BM: NORMAL

## 2024-01-29 RX ORDER — VENLAFAXINE HYDROCHLORIDE 150 MG/1
CAPSULE, EXTENDED RELEASE ORAL
Qty: 90 CAPSULE | Refills: 1 | Status: SHIPPED | OUTPATIENT
Start: 2024-01-29

## 2024-01-29 RX ORDER — GABAPENTIN 300 MG/1
CAPSULE ORAL
Qty: 60 CAPSULE | Refills: 0 | Status: SHIPPED | OUTPATIENT
Start: 2024-01-29 | End: 2024-02-28

## 2024-01-29 RX ORDER — BUSPIRONE HYDROCHLORIDE 15 MG/1
TABLET ORAL
Qty: 180 TABLET | Refills: 1 | Status: SHIPPED | OUTPATIENT
Start: 2024-01-29

## 2024-01-29 RX ORDER — ATORVASTATIN CALCIUM 20 MG/1
TABLET, FILM COATED ORAL
Qty: 90 TABLET | Refills: 1 | Status: SHIPPED | OUTPATIENT
Start: 2024-01-29

## 2024-01-29 RX ORDER — VENLAFAXINE HYDROCHLORIDE 75 MG/1
CAPSULE, EXTENDED RELEASE ORAL
Qty: 90 CAPSULE | Refills: 1 | Status: SHIPPED | OUTPATIENT
Start: 2024-01-29

## 2024-01-31 LAB
AT III ACT/NOR PPP CHRO: 147 % (ref 83–122)
CARDIOLIPIN IGA SER IA-ACNC: 5.4 APL (ref 0–14)
CARDIOLIPIN IGG SER IA-ACNC: 1.7 GPL (ref 0–10)
CARDIOLIPIN IGM SER IA-ACNC: <0.8 MPL (ref 0–10)
DILUTE RUSSELL VIPER VENOM TIME: ABNORMAL
FACTOR VIII ACTIVITY: 125 % (ref 50–150)
INR PPP: 0.9
PARTIAL THROMBOPLASTIN TIME: 24.4 SEC (ref 26.8–34.8)
PROTEIN C ACTIVITY: >150 %
PROTEIN S ACTIVITY: 78 % (ref 59–130)
PROTHROMBIN TIME: 12.5 SEC (ref 11.9–14.8)

## 2024-02-12 ENCOUNTER — OFFICE VISIT (OUTPATIENT)
Dept: ONCOLOGY | Age: 39
End: 2024-02-12
Payer: COMMERCIAL

## 2024-02-12 VITALS
TEMPERATURE: 97.8 F | BODY MASS INDEX: 32.12 KG/M2 | WEIGHT: 199 LBS | SYSTOLIC BLOOD PRESSURE: 106 MMHG | RESPIRATION RATE: 18 BRPM | HEART RATE: 89 BPM | DIASTOLIC BLOOD PRESSURE: 71 MMHG

## 2024-02-12 DIAGNOSIS — D75.839 THROMBOCYTOSIS: ICD-10-CM

## 2024-02-12 DIAGNOSIS — E61.1 IRON DEFICIENCY: Primary | ICD-10-CM

## 2024-02-12 DIAGNOSIS — N92.0 MENORRHAGIA WITH REGULAR CYCLE: ICD-10-CM

## 2024-02-12 PROCEDURE — G8417 CALC BMI ABV UP PARAM F/U: HCPCS | Performed by: INTERNAL MEDICINE

## 2024-02-12 PROCEDURE — 4004F PT TOBACCO SCREEN RCVD TLK: CPT | Performed by: INTERNAL MEDICINE

## 2024-02-12 PROCEDURE — G8427 DOCREV CUR MEDS BY ELIG CLIN: HCPCS | Performed by: INTERNAL MEDICINE

## 2024-02-12 PROCEDURE — G8484 FLU IMMUNIZE NO ADMIN: HCPCS | Performed by: INTERNAL MEDICINE

## 2024-02-12 PROCEDURE — 99214 OFFICE O/P EST MOD 30 MIN: CPT | Performed by: INTERNAL MEDICINE

## 2024-02-12 NOTE — PROGRESS NOTES
explanation of the procedure  including risks.  Universal protocol was followed. Sterile masks, hats, and  gloves were utilized for maximal sterile barrier. A  scan was performed  through the superior pelvis including at the level of the posterior superior  iliac spines. A site was selected for biopsy in the posterior aspect of the  right ilium. The overlying skin, subcutaneous tissue, and periosteum were  anesthetized with 1% lidocaine (including intermittent CT guidance of the  needle tip).  Under intermittent CT guidance, an 11 gauge Tizra bone  marrow biopsy needle was advanced into the right iliac bone. 12 mL of marrow  were aspirated.  Upon removal of the coring device, the bone core sample was  placed in formalin. Blood loss was less than 50 mL. The patient tolerated the  procedure well.    COMPLICATIONS:  None immediately apparent.  Impression: Successful CT-guided right posterior ilium bone marrow biopsy, as above.       ASSESSMENT:       Diagnosis Orders   1. Iron deficiency  CBC with Auto Differential    Iron and TIBC    Ferritin      2. Menorrhagia with regular cycle  CBC with Auto Differential      3. Thrombocytosis  US LIVER SPLEEN                 PLAN:     I personally reviewed results of lab work-up imaging studies,outside records and other relevant clinical data. I had a detailed discussion with the patient.I explained the significance of these abnormalities and possible etiology and management options   Isolated thrombocytosis persist even after correcting iron deficiency anemia> bone marrow biopsy molecular study all negative and this is likely normal/reactive to menorrhagia> will monitor platelet count every 6 months  Continue iron supplement for the iron deficiency anemia related to menorrhagia> repeat iron panel in 4 months  Family history of factor V and pulmonary embolism/thromboembolism> thrombophilia workup is negative  RTC in 4 months with labs/ultrasound of the spleen and the

## 2024-02-13 ENCOUNTER — TELEPHONE (OUTPATIENT)
Dept: PRIMARY CARE CLINIC | Age: 39
End: 2024-02-13

## 2024-02-21 ENCOUNTER — HOSPITAL ENCOUNTER (OUTPATIENT)
Age: 39
Discharge: HOME | End: 2024-02-21
Payer: COMMERCIAL

## 2024-02-21 ENCOUNTER — HOSPITAL ENCOUNTER (OUTPATIENT)
Dept: ULTRASOUND IMAGING | Age: 39
Discharge: HOME | End: 2024-02-23
Payer: COMMERCIAL

## 2024-02-21 DIAGNOSIS — D75.839 THROMBOCYTOSIS: ICD-10-CM

## 2024-02-21 DIAGNOSIS — E11.42 CONTROLLED TYPE 2 DIABETES MELLITUS WITH DIABETIC POLYNEUROPATHY, WITHOUT LONG-TERM CURRENT USE OF INSULIN (HCC): ICD-10-CM

## 2024-02-21 LAB
ANION GAP SERPL CALCULATED.3IONS-SCNC: 12 MMOL/L (ref 9–17)
BUN SERPL-MCNC: 14 MG/DL (ref 6–20)
BUN/CREAT SERPL: 28 (ref 9–20)
CALCIUM SERPL-MCNC: 9.2 MG/DL (ref 8.6–10.4)
CHLORIDE SERPL-SCNC: 104 MMOL/L (ref 98–107)
CO2 SERPL-SCNC: 24 MMOL/L (ref 20–31)
CREAT SERPL-MCNC: 0.5 MG/DL (ref 0.5–0.9)
EST. AVERAGE GLUCOSE BLD GHB EST-MCNC: 128 MG/DL
GFR SERPL CREATININE-BSD FRML MDRD: >60 ML/MIN/1.73M2
GLUCOSE SERPL-MCNC: 122 MG/DL (ref 70–99)
HBA1C MFR BLD: 6.1 % (ref 4–6)
POTASSIUM SERPL-SCNC: 4.5 MMOL/L (ref 3.7–5.3)
SODIUM SERPL-SCNC: 140 MMOL/L (ref 135–144)

## 2024-02-21 PROCEDURE — 76705 ECHO EXAM OF ABDOMEN: CPT

## 2024-02-21 PROCEDURE — 80048 BASIC METABOLIC PNL TOTAL CA: CPT

## 2024-02-21 PROCEDURE — 83036 HEMOGLOBIN GLYCOSYLATED A1C: CPT

## 2024-02-21 PROCEDURE — 36415 COLL VENOUS BLD VENIPUNCTURE: CPT

## 2024-02-22 ENCOUNTER — OFFICE VISIT (OUTPATIENT)
Dept: PRIMARY CARE CLINIC | Age: 39
End: 2024-02-22
Payer: COMMERCIAL

## 2024-02-22 VITALS
RESPIRATION RATE: 18 BRPM | TEMPERATURE: 98.5 F | SYSTOLIC BLOOD PRESSURE: 118 MMHG | BODY MASS INDEX: 32.2 KG/M2 | DIASTOLIC BLOOD PRESSURE: 70 MMHG | OXYGEN SATURATION: 99 % | HEART RATE: 74 BPM | WEIGHT: 199.5 LBS

## 2024-02-22 DIAGNOSIS — F32.A ANXIETY AND DEPRESSION: ICD-10-CM

## 2024-02-22 DIAGNOSIS — F41.9 ANXIETY AND DEPRESSION: ICD-10-CM

## 2024-02-22 DIAGNOSIS — B37.2 CANDIDAL INTERTRIGO: ICD-10-CM

## 2024-02-22 DIAGNOSIS — E11.42 CONTROLLED TYPE 2 DIABETES MELLITUS WITH DIABETIC POLYNEUROPATHY, WITHOUT LONG-TERM CURRENT USE OF INSULIN (HCC): Primary | ICD-10-CM

## 2024-02-22 PROCEDURE — G8427 DOCREV CUR MEDS BY ELIG CLIN: HCPCS | Performed by: NURSE PRACTITIONER

## 2024-02-22 PROCEDURE — G8417 CALC BMI ABV UP PARAM F/U: HCPCS | Performed by: NURSE PRACTITIONER

## 2024-02-22 PROCEDURE — 3044F HG A1C LEVEL LT 7.0%: CPT | Performed by: NURSE PRACTITIONER

## 2024-02-22 PROCEDURE — G8484 FLU IMMUNIZE NO ADMIN: HCPCS | Performed by: NURSE PRACTITIONER

## 2024-02-22 PROCEDURE — 2022F DILAT RTA XM EVC RTNOPTHY: CPT | Performed by: NURSE PRACTITIONER

## 2024-02-22 PROCEDURE — 99214 OFFICE O/P EST MOD 30 MIN: CPT | Performed by: NURSE PRACTITIONER

## 2024-02-22 PROCEDURE — 4004F PT TOBACCO SCREEN RCVD TLK: CPT | Performed by: NURSE PRACTITIONER

## 2024-02-22 RX ORDER — NYSTATIN 100000 [USP'U]/G
POWDER TOPICAL
Qty: 60 G | Refills: 3 | Status: SHIPPED | OUTPATIENT
Start: 2024-02-22

## 2024-02-22 ASSESSMENT — PATIENT HEALTH QUESTIONNAIRE - PHQ9
4. FEELING TIRED OR HAVING LITTLE ENERGY: 0
10. IF YOU CHECKED OFF ANY PROBLEMS, HOW DIFFICULT HAVE THESE PROBLEMS MADE IT FOR YOU TO DO YOUR WORK, TAKE CARE OF THINGS AT HOME, OR GET ALONG WITH OTHER PEOPLE: 0
2. FEELING DOWN, DEPRESSED OR HOPELESS: 0
1. LITTLE INTEREST OR PLEASURE IN DOING THINGS: 0
8. MOVING OR SPEAKING SO SLOWLY THAT OTHER PEOPLE COULD HAVE NOTICED. OR THE OPPOSITE, BEING SO FIGETY OR RESTLESS THAT YOU HAVE BEEN MOVING AROUND A LOT MORE THAN USUAL: 0
3. TROUBLE FALLING OR STAYING ASLEEP: 0
6. FEELING BAD ABOUT YOURSELF - OR THAT YOU ARE A FAILURE OR HAVE LET YOURSELF OR YOUR FAMILY DOWN: 0
SUM OF ALL RESPONSES TO PHQ9 QUESTIONS 1 & 2: 0
SUM OF ALL RESPONSES TO PHQ QUESTIONS 1-9: 0
7. TROUBLE CONCENTRATING ON THINGS, SUCH AS READING THE NEWSPAPER OR WATCHING TELEVISION: 0
5. POOR APPETITE OR OVEREATING: 0
SUM OF ALL RESPONSES TO PHQ QUESTIONS 1-9: 0

## 2024-02-22 ASSESSMENT — ENCOUNTER SYMPTOMS
SHORTNESS OF BREATH: 0
ABDOMINAL PAIN: 0
DIARRHEA: 0
VOMITING: 0
NAUSEA: 0
CONSTIPATION: 0
COUGH: 0
VISUAL CHANGE: 0
RHINORRHEA: 0
SORE THROAT: 0
WHEEZING: 0

## 2024-02-22 NOTE — PROGRESS NOTES
Name: Lilly Butler  : 1985         Chief Complaint:     Chief Complaint   Patient presents with    Diabetes     Routine check. No concerns.    Yeast Infection     Under abdominal fold    Mental Health Problem     Doing well       History of Present Illness:      Lilly Butler is a 38 y.o.  female who presents with Diabetes (Routine check. No concerns.), Yeast Infection (Under abdominal fold), and Mental Health Problem (Doing well)      Lilly is here today for a routine office visit.    Overall she states she is doing well.  She states she has been compliant with her medication.  She does have a CGM and states this is very helpful.  She does have a flare of her abdominal fold rash.  See below for further comments.    Diabetes  She presents for her follow-up diabetic visit. She has type 2 diabetes mellitus. Onset time: YEARS. Her disease course has been stable. Pertinent negatives for hypoglycemia include no confusion, dizziness, headaches, nervousness/anxiousness, pallor, seizures, sleepiness or speech difficulty. Associated symptoms include foot paresthesias. Pertinent negatives for diabetes include no chest pain, no fatigue, no polydipsia, no polyphagia, no polyuria, no visual change and no weakness. There are no hypoglycemic complications. Pertinent negatives for hypoglycemia complications include no blackouts, no required assistance and no required glucagon injection. Symptoms are stable. There are no diabetic complications. Pertinent negatives for diabetic complications include no CVA, heart disease, nephropathy or peripheral neuropathy. Risk factors for coronary artery disease include diabetes mellitus, dyslipidemia, family history, obesity, sedentary lifestyle and stress. Current diabetic treatment includes insulin injections and oral agent (triple therapy). She is compliant with treatment all of the time. Her weight is stable. She is following a generally healthy diet. Meal planning

## 2024-02-22 NOTE — PATIENT INSTRUCTIONS
SURVEY:     You may be receiving a survey from [facility] regarding your visit today.     Please complete the survey to enable us to provide the highest quality of care to you and your family.     If you cannot score us a very good on any question, please call the office to discuss how we could have made your experience a very good one.     Thank you,    Silvio Johnson, APRN-CNP  Lacey Zuniga, APRN-CNP  Tammi, LPN  Marycruz, CMA  Demetrius, CMA  Nicki, CMA  Adriana, PCA  Ca, CMA  Shelby, PM

## 2024-03-06 DIAGNOSIS — E11.9 CONTROLLED TYPE 2 DIABETES MELLITUS WITHOUT COMPLICATION, WITHOUT LONG-TERM CURRENT USE OF INSULIN (HCC): ICD-10-CM

## 2024-03-06 RX ORDER — LISINOPRIL 2.5 MG/1
TABLET ORAL
Qty: 31 TABLET | Refills: 5 | Status: SHIPPED | OUTPATIENT
Start: 2024-03-06

## 2024-03-06 NOTE — TELEPHONE ENCOUNTER
Health Maintenance   Topic Date Due    Flu vaccine (1) 06/30/2024 (Originally 8/1/2023)    Cervical cancer screen  08/02/2024 (Originally 9/2/2008)    Hepatitis B vaccine (1 of 3 - 3-dose series) 02/22/2025 (Originally 1985)    DTaP/Tdap/Td vaccine (2 - Tdap) 02/22/2025 (Originally 6/28/2015)    Pneumococcal 0-64 years Vaccine (2 - PCV) 02/22/2025 (Originally 6/2/2018)    COVID-19 Vaccine (4 - 2023-24 season) 02/22/2025 (Originally 9/1/2023)    Diabetic Alb to Cr ratio (uACR) test  07/27/2024    Lipids  07/27/2024    A1C test (Diabetic or Prediabetic)  02/21/2025    GFR test (Diabetes, CKD 3-4, OR last GFR 15-59)  02/21/2025    Diabetic foot exam  02/22/2025    Depression Monitoring  02/22/2025    Diabetic retinal exam  02/23/2025    Hepatitis C screen  Completed    HIV screen  Completed    Hepatitis A vaccine  Aged Out    Hib vaccine  Aged Out    HPV vaccine  Aged Out    Polio vaccine  Aged Out    Meningococcal (ACWY) vaccine  Aged Out    Varicella vaccine  Discontinued             (applicable per patient's age: Cancer Screenings, Depression Screening, Fall Risk Screening, Immunizations)    Hemoglobin A1C (%)   Date Value   02/21/2024 6.1 (H)   07/27/2023 7.3 (H)   01/27/2023 8.0     LDL Cholesterol (mg/dL)   Date Value   07/27/2023 74     AST (U/L)   Date Value   07/27/2023 17     ALT (U/L)   Date Value   07/27/2023 21     BUN (mg/dL)   Date Value   02/21/2024 14      (goal A1C is < 7)   (goal LDL is <100) need 30-50% reduction from baseline     BP Readings from Last 3 Encounters:   02/22/24 118/70   02/12/24 106/71   01/24/24 126/76    (goal /80)      All Future Testing planned in [facility]:  Lab Frequency Next Occurrence   CBC with Auto Differential Once 02/19/2024   Iron and TIBC Once 02/12/2024   Ferritin Once 02/12/2024   AST Once 08/22/2024   ALT Once 08/22/2024   Microalbumin, Ur Once 08/20/2024   Lipid Panel Once 08/20/2024   Basic Metabolic Panel Once 08/22/2024   Hemoglobin A1C Once 08/20/2024

## 2024-03-13 ENCOUNTER — TELEPHONE (OUTPATIENT)
Dept: PRIMARY CARE CLINIC | Age: 39
End: 2024-03-13

## 2024-03-13 RX ORDER — ORAL SEMAGLUTIDE 3 MG/1
6 TABLET ORAL
Qty: 30 TABLET | Refills: 0 | Status: SHIPPED | OUTPATIENT
Start: 2024-03-13 | End: 2024-04-12

## 2024-03-13 NOTE — TELEPHONE ENCOUNTER
Call to Lilly to  [facility] Patient Assistance Form.    Lilly only has 1 more dose of Rybelsus left.

## 2024-03-13 NOTE — TELEPHONE ENCOUNTER
Lilly is on Rybelsus and recently lost her insurance.  Is there a cheaper version or a patient assistance available so she can continue taking the medication?

## 2024-03-13 NOTE — TELEPHONE ENCOUNTER
Patient only has 1 dose of Rybelsus left. We do not have samples in 14mg, did you want her to do anything else until she is able to get approved for patient assistance?   Please advise thank you

## 2024-03-25 DIAGNOSIS — G62.9 PERIPHERAL POLYNEUROPATHY: ICD-10-CM

## 2024-03-25 RX ORDER — FERROUS SULFATE 325(65) MG
1 TABLET ORAL
Qty: 90 TABLET | Refills: 0 | Status: SHIPPED | OUTPATIENT
Start: 2024-03-25

## 2024-03-25 RX ORDER — GABAPENTIN 300 MG/1
CAPSULE ORAL
Qty: 60 CAPSULE | Refills: 0 | Status: SHIPPED | OUTPATIENT
Start: 2024-03-25 | End: 2024-04-25

## 2024-03-25 NOTE — TELEPHONE ENCOUNTER
Health Maintenance   Topic Date Due    Flu vaccine (1) 06/30/2024 (Originally 8/1/2023)    Cervical cancer screen  08/02/2024 (Originally 9/2/2008)    Hepatitis B vaccine (1 of 3 - 3-dose series) 02/22/2025 (Originally 1985)    DTaP/Tdap/Td vaccine (2 - Tdap) 02/22/2025 (Originally 6/28/2015)    Pneumococcal 0-64 years Vaccine (2 of 2 - PCV) 02/22/2025 (Originally 6/2/2018)    COVID-19 Vaccine (4 - 2023-24 season) 02/22/2025 (Originally 9/1/2023)    Diabetic Alb to Cr ratio (uACR) test  07/27/2024    Lipids  07/27/2024    A1C test (Diabetic or Prediabetic)  02/21/2025    GFR test (Diabetes, CKD 3-4, OR last GFR 15-59)  02/21/2025    Diabetic foot exam  02/22/2025    Depression Monitoring  02/22/2025    Diabetic retinal exam  02/23/2025    Hepatitis C screen  Completed    HIV screen  Completed    Hepatitis A vaccine  Aged Out    Hib vaccine  Aged Out    HPV vaccine  Aged Out    Polio vaccine  Aged Out    Meningococcal (ACWY) vaccine  Aged Out    Varicella vaccine  Discontinued             (applicable per patient's age: Cancer Screenings, Depression Screening, Fall Risk Screening, Immunizations)    Hemoglobin A1C (%)   Date Value   02/21/2024 6.1 (H)   07/27/2023 7.3 (H)   01/27/2023 8.0     LDL Cholesterol (mg/dL)   Date Value   07/27/2023 74     AST (U/L)   Date Value   07/27/2023 17     ALT (U/L)   Date Value   07/27/2023 21     BUN (mg/dL)   Date Value   02/21/2024 14      (goal A1C is < 7)   (goal LDL is <100) need 30-50% reduction from baseline     BP Readings from Last 3 Encounters:   02/22/24 118/70   02/12/24 106/71   01/24/24 126/76    (goal /80)      All Future Testing planned in [facility]:  Lab Frequency Next Occurrence   CBC with Auto Differential Once 02/19/2024   Iron and TIBC Once 02/12/2024   Ferritin Once 02/12/2024   AST Once 08/22/2024   ALT Once 08/22/2024   Microalbumin, Ur Once 08/20/2024   Lipid Panel Once 08/20/2024   Basic Metabolic Panel Once 08/22/2024   Hemoglobin A1C Once

## 2024-04-17 ENCOUNTER — TELEPHONE (OUTPATIENT)
Dept: PRIMARY CARE CLINIC | Age: 39
End: 2024-04-17

## 2024-04-17 NOTE — TELEPHONE ENCOUNTER
Received notice from [employer] Patient Assistance.  Writer notified Lilly and she is already pursuing  Medicaid

## 2024-04-19 DIAGNOSIS — G62.9 PERIPHERAL POLYNEUROPATHY: ICD-10-CM

## 2024-04-19 RX ORDER — GABAPENTIN 300 MG/1
CAPSULE ORAL
Qty: 60 CAPSULE | Refills: 0 | OUTPATIENT
Start: 2024-04-19 | End: 2024-05-19

## 2024-04-19 NOTE — TELEPHONE ENCOUNTER
Health Maintenance   Topic Date Due    Cervical cancer screen  08/02/2024 (Originally 9/2/2008)    Hepatitis B vaccine (1 of 3 - 3-dose series) 02/22/2025 (Originally 1985)    DTaP/Tdap/Td vaccine (2 - Tdap) 02/22/2025 (Originally 6/28/2015)    Pneumococcal 0-64 years Vaccine (2 of 2 - PCV) 02/22/2025 (Originally 6/2/2018)    COVID-19 Vaccine (4 - 2023-24 season) 02/22/2025 (Originally 9/1/2023)    Diabetic Alb to Cr ratio (uACR) test  07/27/2024    Lipids  07/27/2024    Flu vaccine (Season Ended) 08/01/2024    A1C test (Diabetic or Prediabetic)  02/21/2025    GFR test (Diabetes, CKD 3-4, OR last GFR 15-59)  02/21/2025    Diabetic foot exam  02/22/2025    Depression Monitoring  02/22/2025    Diabetic retinal exam  02/23/2025    Hepatitis C screen  Completed    HIV screen  Completed    Hepatitis A vaccine  Aged Out    Hib vaccine  Aged Out    HPV vaccine  Aged Out    Polio vaccine  Aged Out    Meningococcal (ACWY) vaccine  Aged Out    Varicella vaccine  Discontinued             (applicable per patient's age: Cancer Screenings, Depression Screening, Fall Risk Screening, Immunizations)    Hemoglobin A1C (%)   Date Value   02/21/2024 6.1 (H)   07/27/2023 7.3 (H)   01/27/2023 8.0     LDL Cholesterol (mg/dL)   Date Value   07/27/2023 74     AST (U/L)   Date Value   07/27/2023 17     ALT (U/L)   Date Value   07/27/2023 21     BUN (mg/dL)   Date Value   02/21/2024 14      (goal A1C is < 7)   (goal LDL is <100) need 30-50% reduction from baseline     BP Readings from Last 3 Encounters:   02/22/24 118/70   02/12/24 106/71   01/24/24 126/76    (goal /80)      All Future Testing planned in [facility]:  Lab Frequency Next Occurrence   CBC with Auto Differential Once 02/19/2024   Iron and TIBC Once 02/12/2024   Ferritin Once 02/12/2024   AST Once 08/22/2024   ALT Once 08/22/2024   Microalbumin, Ur Once 08/20/2024   Lipid Panel Once 08/20/2024   Basic Metabolic Panel Once 08/22/2024   Hemoglobin A1C Once 08/20/2024

## 2024-04-30 ENCOUNTER — OFFICE VISIT (OUTPATIENT)
Dept: PRIMARY CARE CLINIC | Age: 39
End: 2024-04-30
Payer: COMMERCIAL

## 2024-04-30 ENCOUNTER — HOSPITAL ENCOUNTER (OUTPATIENT)
Age: 39
Setting detail: SPECIMEN
Discharge: HOME | End: 2024-04-30

## 2024-04-30 VITALS
RESPIRATION RATE: 16 BRPM | DIASTOLIC BLOOD PRESSURE: 80 MMHG | OXYGEN SATURATION: 99 % | SYSTOLIC BLOOD PRESSURE: 122 MMHG | HEART RATE: 73 BPM | TEMPERATURE: 97.1 F | BODY MASS INDEX: 33.89 KG/M2 | WEIGHT: 210 LBS

## 2024-04-30 DIAGNOSIS — R31.9 HEMATURIA, UNSPECIFIED TYPE: ICD-10-CM

## 2024-04-30 DIAGNOSIS — F41.9 ANXIETY AND DEPRESSION: ICD-10-CM

## 2024-04-30 DIAGNOSIS — R80.9 PROTEINURIA, UNSPECIFIED TYPE: ICD-10-CM

## 2024-04-30 DIAGNOSIS — N30.01 ACUTE CYSTITIS WITH HEMATURIA: Primary | ICD-10-CM

## 2024-04-30 DIAGNOSIS — R35.0 URINARY FREQUENCY: ICD-10-CM

## 2024-04-30 DIAGNOSIS — E55.9 VITAMIN D DEFICIENCY: ICD-10-CM

## 2024-04-30 DIAGNOSIS — F32.A ANXIETY AND DEPRESSION: ICD-10-CM

## 2024-04-30 DIAGNOSIS — G62.9 PERIPHERAL POLYNEUROPATHY: ICD-10-CM

## 2024-04-30 DIAGNOSIS — E11.9 CONTROLLED TYPE 2 DIABETES MELLITUS WITHOUT COMPLICATION, WITHOUT LONG-TERM CURRENT USE OF INSULIN (HCC): ICD-10-CM

## 2024-04-30 LAB
BILIRUBIN, POC: ABNORMAL
BLOOD URINE, POC: ABNORMAL
CLARITY, POC: ABNORMAL
COLOR, POC: YELLOW
GLUCOSE URINE, POC: 100
KETONES, POC: ABNORMAL
LEUKOCYTE EST, POC: ABNORMAL
NITRITE, POC: ABNORMAL
PH, POC: 6
PROTEIN, POC: 100
SPECIFIC GRAVITY, POC: 1.03
UROBILINOGEN, POC: 0.2

## 2024-04-30 PROCEDURE — 99214 OFFICE O/P EST MOD 30 MIN: CPT | Performed by: NURSE PRACTITIONER

## 2024-04-30 PROCEDURE — G8427 DOCREV CUR MEDS BY ELIG CLIN: HCPCS | Performed by: NURSE PRACTITIONER

## 2024-04-30 PROCEDURE — 87077 CULTURE AEROBIC IDENTIFY: CPT

## 2024-04-30 PROCEDURE — G8417 CALC BMI ABV UP PARAM F/U: HCPCS | Performed by: NURSE PRACTITIONER

## 2024-04-30 PROCEDURE — 4004F PT TOBACCO SCREEN RCVD TLK: CPT | Performed by: NURSE PRACTITIONER

## 2024-04-30 PROCEDURE — 87186 SC STD MICRODIL/AGAR DIL: CPT

## 2024-04-30 PROCEDURE — 81003 URINALYSIS AUTO W/O SCOPE: CPT | Performed by: NURSE PRACTITIONER

## 2024-04-30 PROCEDURE — 87086 URINE CULTURE/COLONY COUNT: CPT

## 2024-04-30 RX ORDER — ORAL SEMAGLUTIDE 14 MG/1
1 TABLET ORAL DAILY
Qty: 30 TABLET | Refills: 5 | Status: SHIPPED | OUTPATIENT
Start: 2024-04-30

## 2024-04-30 RX ORDER — VENLAFAXINE HYDROCHLORIDE 75 MG/1
75 CAPSULE, EXTENDED RELEASE ORAL DAILY
Qty: 30 CAPSULE | Refills: 5 | Status: SHIPPED | OUTPATIENT
Start: 2024-04-30

## 2024-04-30 RX ORDER — VENLAFAXINE HYDROCHLORIDE 150 MG/1
150 CAPSULE, EXTENDED RELEASE ORAL DAILY
Qty: 30 CAPSULE | Refills: 5 | Status: SHIPPED | OUTPATIENT
Start: 2024-04-30

## 2024-04-30 RX ORDER — CHOLECALCIFEROL (VITAMIN D3) 25 MCG
2 CAPSULE ORAL DAILY
Qty: 60 CAPSULE | Refills: 5 | Status: SHIPPED | OUTPATIENT
Start: 2024-04-30

## 2024-04-30 RX ORDER — NITROFURANTOIN 25; 75 MG/1; MG/1
100 CAPSULE ORAL 2 TIMES DAILY
Qty: 10 CAPSULE | Refills: 0 | Status: SHIPPED | OUTPATIENT
Start: 2024-04-30 | End: 2024-05-02 | Stop reason: ALTCHOICE

## 2024-04-30 RX ORDER — GABAPENTIN 300 MG/1
300 CAPSULE ORAL 2 TIMES DAILY
Qty: 60 CAPSULE | Refills: 2 | Status: SHIPPED | OUTPATIENT
Start: 2024-04-30 | End: 2024-07-29

## 2024-04-30 RX ORDER — LISINOPRIL 2.5 MG/1
2.5 TABLET ORAL DAILY
Qty: 30 TABLET | Refills: 5 | Status: SHIPPED | OUTPATIENT
Start: 2024-04-30

## 2024-04-30 ASSESSMENT — PATIENT HEALTH QUESTIONNAIRE - PHQ9
9. THOUGHTS THAT YOU WOULD BE BETTER OFF DEAD, OR OF HURTING YOURSELF: NOT AT ALL
1. LITTLE INTEREST OR PLEASURE IN DOING THINGS: NOT AT ALL
SUM OF ALL RESPONSES TO PHQ9 QUESTIONS 1 & 2: 0
10. IF YOU CHECKED OFF ANY PROBLEMS, HOW DIFFICULT HAVE THESE PROBLEMS MADE IT FOR YOU TO DO YOUR WORK, TAKE CARE OF THINGS AT HOME, OR GET ALONG WITH OTHER PEOPLE: NOT DIFFICULT AT ALL
SUM OF ALL RESPONSES TO PHQ QUESTIONS 1-9: 0
3. TROUBLE FALLING OR STAYING ASLEEP: NOT AT ALL
SUM OF ALL RESPONSES TO PHQ QUESTIONS 1-9: 0
4. FEELING TIRED OR HAVING LITTLE ENERGY: NOT AT ALL
2. FEELING DOWN, DEPRESSED OR HOPELESS: NOT AT ALL
6. FEELING BAD ABOUT YOURSELF - OR THAT YOU ARE A FAILURE OR HAVE LET YOURSELF OR YOUR FAMILY DOWN: NOT AT ALL
5. POOR APPETITE OR OVEREATING: NOT AT ALL
8. MOVING OR SPEAKING SO SLOWLY THAT OTHER PEOPLE COULD HAVE NOTICED. OR THE OPPOSITE, BEING SO FIGETY OR RESTLESS THAT YOU HAVE BEEN MOVING AROUND A LOT MORE THAN USUAL: NOT AT ALL
7. TROUBLE CONCENTRATING ON THINGS, SUCH AS READING THE NEWSPAPER OR WATCHING TELEVISION: NOT AT ALL

## 2024-04-30 ASSESSMENT — ENCOUNTER SYMPTOMS
RESPIRATORY NEGATIVE: 1
EYES NEGATIVE: 1
ALLERGIC/IMMUNOLOGIC NEGATIVE: 1
GASTROINTESTINAL NEGATIVE: 1

## 2024-04-30 NOTE — PATIENT INSTRUCTIONS
SURVEY:     You may be receiving a survey from [facility] regarding your visit today.     Please complete the survey to enable us to provide the highest quality of care to you and your family.     If you cannot score us a very good on any question, please call the office to discuss how we could have made your experience a very good one.     Thank you,    Silvio Johnson, APRN-CNP  Lacey Zuniga, APRN-CNP  Tammi, LPN  Marycruz, CMA  Demetrius, CMA  Nicki, CMA  Adriana, PCA  Ca, CMA  Shelby, PM

## 2024-04-30 NOTE — PROGRESS NOTES
Take 1 tablet by mouth nightly 12/4/23  Yes Lacey Zuniga APRN - CNP   ondansetron (ZOFRAN) 4 MG tablet TAKE 1 TABLET BY MOUTH THREE TIMES DAILY AS NEEDED FOR NAUSEA FOR VOMITING 9/24/23  Yes Silvio Johnson APRN - CNP   blood glucose monitor supplies Reli On Monitor, Alcohol Swabs, Lancets, Test Strips. Test 1 times a day & as needed for symptoms of irregular blood glucose. Dispense sufficient amount for indicated testing frequency plus additional to accommodate PRN testing needs. 2/22/23  Yes Silvio Johnson APRN - CNP   montelukast (SINGULAIR) 10 MG tablet Take 1 tablet by mouth once daily 1/27/23  Yes Silvio Johnson APRN - CNP   glucose monitoring kit (FREESTYLE) monitoring kit 1 kit by Does not apply route daily Substitute as needed for insurance requirements. 5/7/21  Yes Campos Minor APRN - CNP        Allergies:       Patient has no known allergies.    Social History:     Tobacco:    reports that she has been smoking cigarettes. She started smoking about 23 years ago. She has a 4.7 pack-year smoking history. She has never used smokeless tobacco.  Alcohol:      reports that she does not currently use alcohol after a past usage of about 1.0 standard drink of alcohol per week.  Drug Use:  reports current drug use. Drug: Marijuana (Weed).    Family History:     Family History   Adopted: Yes   Problem Relation Age of Onset    Other Mother         OVER Dose    Alcohol Abuse Mother     Depression Mother     Mental Illness Mother     Substance Abuse Mother     Other Father         Over dose    High Blood Pressure Maternal Grandfather        Review of Systems:     Positive and Negative as described in HPI    Review of Systems   Constitutional: Negative.  Negative for fever.   HENT: Negative.     Eyes: Negative.    Respiratory: Negative.     Cardiovascular: Negative.    Gastrointestinal: Negative.    Endocrine: Negative.    Genitourinary:  Positive for dysuria, frequency and urgency. Negative for flank pain and

## 2024-04-30 NOTE — TELEPHONE ENCOUNTER
Patient now has insurance and needs medication sent to the pharmacy. Can you please refill?   Thank you

## 2024-05-01 ENCOUNTER — HOSPITAL ENCOUNTER (OUTPATIENT)
Dept: CT IMAGING | Age: 39
Discharge: HOME | End: 2024-05-03

## 2024-05-01 ENCOUNTER — TELEPHONE (OUTPATIENT)
Dept: PRIMARY CARE CLINIC | Age: 39
End: 2024-05-01

## 2024-05-01 DIAGNOSIS — R35.0 URINARY FREQUENCY: ICD-10-CM

## 2024-05-01 DIAGNOSIS — R80.9 PROTEINURIA, UNSPECIFIED TYPE: ICD-10-CM

## 2024-05-01 DIAGNOSIS — R31.9 HEMATURIA, UNSPECIFIED TYPE: ICD-10-CM

## 2024-05-01 PROCEDURE — 74176 CT ABD & PELVIS W/O CONTRAST: CPT

## 2024-05-01 NOTE — TELEPHONE ENCOUNTER
----- Message from Lilly Butler sent at 5/1/2024  2:28 PM EDT -----  Regarding: Still miserable   Contact: [phone]  What are we doing about my diabetic meds since I can’t afford them and I’m still going pee all the time little bits and it’s burning still. I was there yesterday but I can’t afford the ct scan for the kidney stones. I’m miserable is there anything we can do.

## 2024-05-01 NOTE — TELEPHONE ENCOUNTER
Lilly called to ask about her message earlier today.  She did not get the CT scheduled this morning due to lack of insurance.  She also needs help with her diabetic medications.    Writer advised that [facility] has financial assistance available and she should complete the CT in order to determine the cause of her symptoms.    Writer transferred pt to centralized scheduling.

## 2024-05-01 NOTE — TELEPHONE ENCOUNTER
Call to pt.  [employer]\[facility] [place] [facility]\"" approved [employer] for Lilly on 4/25/24.  Writer informed Lilly shipment would be received at the office  in 10-14 days and she would be contacted to .    Lilly was enroute to hospital for CT.

## 2024-05-01 NOTE — TELEPHONE ENCOUNTER
Lilly contacted scheduling and was told since it was ordered STAT, a new order was needed.   Destiney re-entered STAT order

## 2024-05-01 NOTE — TELEPHONE ENCOUNTER
Unfortunately I am not fully aware of the entire situation as I did not see her for this acute issue.  I am in agreement she should have the CT if that is what Lacey ordered.  I am also unaware of the situation with her medications.  Does she have insurance now?  Does she need new prescriptions?  Please let me know how we can help.

## 2024-05-02 ENCOUNTER — TELEPHONE (OUTPATIENT)
Dept: PRIMARY CARE CLINIC | Age: 39
End: 2024-05-02

## 2024-05-02 DIAGNOSIS — N30.01 ACUTE CYSTITIS WITH HEMATURIA: Primary | ICD-10-CM

## 2024-05-02 LAB
MICROORGANISM SPEC CULT: ABNORMAL
SPECIMEN DESCRIPTION: ABNORMAL

## 2024-05-02 RX ORDER — CEPHALEXIN 500 MG/1
500 CAPSULE ORAL 3 TIMES DAILY
Qty: 30 CAPSULE | Refills: 0 | Status: SHIPPED | OUTPATIENT
Start: 2024-05-02 | End: 2024-05-12

## 2024-05-02 NOTE — TELEPHONE ENCOUNTER
----- Message from NINA Wilburn - CNP sent at 5/2/2024  1:11 PM EDT -----  Please notify patient of urine culture results.  The macrobid prescribed may not be working.  Ask her if she would like to stop in for a Rocephin shot or I can prescribe a different antibiotic.  Thanks Lacey

## 2024-05-02 NOTE — TELEPHONE ENCOUNTER
----- Message from NINA Shea CNP sent at 5/1/2024  9:59 PM EDT -----  No stone, but it does appear that she has a UTI, continue ATB that Lacey prescribed.

## 2024-05-07 ENCOUNTER — TELEPHONE (OUTPATIENT)
Dept: PRIMARY CARE CLINIC | Age: 39
End: 2024-05-07

## 2024-06-17 RX ORDER — FERROUS SULFATE 325(65) MG
1 TABLET ORAL
Qty: 90 TABLET | Refills: 0 | Status: SHIPPED | OUTPATIENT
Start: 2024-06-17

## 2024-07-26 DIAGNOSIS — D75.839 THROMBOCYTOSIS: ICD-10-CM

## 2024-07-26 DIAGNOSIS — E61.1 IRON DEFICIENCY: Primary | ICD-10-CM

## 2024-08-11 DIAGNOSIS — E11.9 CONTROLLED TYPE 2 DIABETES MELLITUS WITHOUT COMPLICATION, WITHOUT LONG-TERM CURRENT USE OF INSULIN (HCC): ICD-10-CM

## 2024-08-12 DIAGNOSIS — G62.9 PERIPHERAL POLYNEUROPATHY: ICD-10-CM

## 2024-08-12 RX ORDER — GABAPENTIN 300 MG/1
300 CAPSULE ORAL 2 TIMES DAILY
Qty: 60 CAPSULE | Refills: 2 | Status: SHIPPED | OUTPATIENT
Start: 2024-08-12 | End: 2024-11-10

## 2024-08-12 RX ORDER — ORAL SEMAGLUTIDE 14 MG/1
1 TABLET ORAL DAILY
Qty: 30 TABLET | Refills: 5 | Status: SHIPPED | OUTPATIENT
Start: 2024-08-12

## 2024-08-13 ENCOUNTER — TELEPHONE (OUTPATIENT)
Dept: PRIMARY CARE CLINIC | Age: 39
End: 2024-08-13

## 2024-08-13 DIAGNOSIS — G62.9 PERIPHERAL POLYNEUROPATHY: ICD-10-CM

## 2024-08-13 RX ORDER — GABAPENTIN 300 MG/1
300 CAPSULE ORAL 2 TIMES DAILY
Qty: 60 CAPSULE | Refills: 0 | OUTPATIENT
Start: 2024-08-13

## 2024-08-13 NOTE — TELEPHONE ENCOUNTER
On 8/9/24 patients [facility] [[email or web address]] patient assistance was attempted to be delivered.  [facility]up[facility] next door took the delivery and placed it in the refrigerator as they did not know what was in side of the box.  Writer called [employer] and they advised that the medicaion would not be able to be taken anymore because it was kept under the recommended temp.  Letter is needed to be sent to get a replacement shipment and office will not receive notification of shipment of new prescription but patient should receive notification when shipped and the processing and shipping should take 10-14 business days.  Letter typed up for Silvio to sign and writer to fax to [phone].

## 2024-08-13 NOTE — TELEPHONE ENCOUNTER
Informed patient of medication issue and letter faxed, medication will arrive 10-14 days. Patiwent verbalizes understanding.

## 2024-08-28 ENCOUNTER — TELEPHONE (OUTPATIENT)
Dept: PRIMARY CARE CLINIC | Age: 39
End: 2024-08-28

## 2024-09-27 RX ORDER — FERROUS SULFATE 325(65) MG
1 TABLET ORAL
Qty: 90 TABLET | Refills: 0 | Status: SHIPPED | OUTPATIENT
Start: 2024-09-27

## 2024-10-03 ENCOUNTER — TELEPHONE (OUTPATIENT)
Dept: PRIMARY CARE CLINIC | Age: 39
End: 2024-10-03

## 2024-11-05 ENCOUNTER — TELEPHONE (OUTPATIENT)
Dept: PRIMARY CARE CLINIC | Age: 39
End: 2024-11-05

## 2024-11-26 ENCOUNTER — TELEPHONE (OUTPATIENT)
Dept: ONCOLOGY | Age: 39
End: 2024-11-26

## 2024-11-26 NOTE — TELEPHONE ENCOUNTER
Patient failed to show to appointment on 7/29/24.  LM for patient to call office to reschedule on 8/26/24, 9/17/24 and 10/18/24.  No return call.  Letter sent.

## 2025-02-12 DIAGNOSIS — E11.9 CONTROLLED TYPE 2 DIABETES MELLITUS WITHOUT COMPLICATION, WITHOUT LONG-TERM CURRENT USE OF INSULIN (HCC): ICD-10-CM

## 2025-02-12 RX ORDER — EMPAGLIFLOZIN 25 MG/1
25 TABLET, FILM COATED ORAL DAILY
Qty: 30 TABLET | Refills: 0 | Status: SHIPPED | OUTPATIENT
Start: 2025-02-12

## 2025-02-12 NOTE — TELEPHONE ENCOUNTER
Health Maintenance   Topic Date Due    Cervical cancer screen  Never done    Diabetic Alb to Cr ratio (uACR) test  07/27/2024    Lipids  07/27/2024    Flu vaccine (1) 08/01/2024    COVID-19 Vaccine (4 - 2024-25 season) 09/01/2024    A1C test (Diabetic or Prediabetic)  02/21/2025    GFR test (Diabetes, CKD 3-4, OR last GFR 15-59)  02/21/2025    Diabetic foot exam  02/22/2025    Diabetic retinal exam  02/23/2025    Hepatitis B vaccine (1 of 3 - 19+ 3-dose series) 02/22/2025 (Originally 9/2/2006)    DTaP/Tdap/Td vaccine (2 - Tdap) 02/22/2025 (Originally 6/28/2015)    Pneumococcal 0-49 years Vaccine (2 of 2 - PCV) 02/22/2025 (Originally 6/2/2018)    Depression Monitoring  04/30/2025    Hepatitis C screen  Completed    HIV screen  Completed    Hepatitis A vaccine  Aged Out    Hib vaccine  Aged Out    HPV vaccine  Aged Out    Polio vaccine  Aged Out    Meningococcal (ACWY) vaccine  Aged Out    Varicella vaccine  Discontinued             (applicable per patient's age: Cancer Screenings, Depression Screening, Fall Risk Screening, Immunizations)    Hemoglobin A1C (%)   Date Value   02/21/2024 6.1 (H)   07/27/2023 7.3 (H)   01/27/2023 8.0     AST (U/L)   Date Value   07/27/2023 17     ALT (U/L)   Date Value   07/27/2023 21     BUN (mg/dL)   Date Value   02/21/2024 14      (goal A1C is < 7)   (goal LDL is <100) need 30-50% reduction from baseline     BP Readings from Last 3 Encounters:   04/30/24 122/80   02/22/24 118/70   02/12/24 106/71    (goal /80)      All Future Testing planned in [facility]:  Lab Frequency Next Occurrence   AST Once 08/22/2024   ALT Once 08/22/2024   Albumin/Creatinine Ratio, Urine Once 08/20/2024   Lipid Panel Once 08/20/2024   Basic Metabolic Panel Once 08/22/2024   Hemoglobin A1C Once 08/20/2024   CBC with Auto Differential Once 07/26/2024   Iron and TIBC Once 07/26/2024       Next Visit Date:  No future appointments.         Patient Active Problem List:     Dyslipidemia     Insomnia     Anxiety

## 2025-05-06 DIAGNOSIS — E11.9 CONTROLLED TYPE 2 DIABETES MELLITUS WITHOUT COMPLICATION, WITHOUT LONG-TERM CURRENT USE OF INSULIN (HCC): ICD-10-CM

## 2025-05-06 RX ORDER — ORAL SEMAGLUTIDE 14 MG/1
1 TABLET ORAL DAILY
Qty: 90 TABLET | Refills: 3 | Status: SHIPPED | OUTPATIENT
Start: 2025-05-06

## 2025-05-06 NOTE — TELEPHONE ENCOUNTER
Scripts need printed for patient assistance.  Forms need signed.  Waiting for patient to do her portion.

## 2025-06-13 ENCOUNTER — TELEPHONE (OUTPATIENT)
Dept: PRIMARY CARE CLINIC | Age: 40
End: 2025-06-13

## 2025-06-13 DIAGNOSIS — E11.9 CONTROLLED TYPE 2 DIABETES MELLITUS WITHOUT COMPLICATION, WITHOUT LONG-TERM CURRENT USE OF INSULIN (HCC): ICD-10-CM

## 2025-06-13 NOTE — TELEPHONE ENCOUNTER
The letter states that she has insurance that will cover the medication.  I have sent a prescription to the pharmacy.  Thanks

## 2025-06-19 ENCOUNTER — TELEPHONE (OUTPATIENT)
Dept: PRIMARY CARE CLINIC | Age: 40
End: 2025-06-19

## 2025-07-02 ENCOUNTER — OFFICE VISIT (OUTPATIENT)
Dept: PRIMARY CARE CLINIC | Age: 40
End: 2025-07-02
Payer: COMMERCIAL

## 2025-07-02 VITALS
RESPIRATION RATE: 20 BRPM | SYSTOLIC BLOOD PRESSURE: 122 MMHG | HEART RATE: 88 BPM | BODY MASS INDEX: 36.68 KG/M2 | DIASTOLIC BLOOD PRESSURE: 70 MMHG | WEIGHT: 207 LBS | HEIGHT: 63 IN

## 2025-07-02 DIAGNOSIS — E78.5 DYSLIPIDEMIA: ICD-10-CM

## 2025-07-02 DIAGNOSIS — E11.9 CONTROLLED TYPE 2 DIABETES MELLITUS WITHOUT COMPLICATION, WITHOUT LONG-TERM CURRENT USE OF INSULIN (HCC): Primary | ICD-10-CM

## 2025-07-02 LAB — HBA1C MFR BLD: 6.8 %

## 2025-07-02 PROCEDURE — 99214 OFFICE O/P EST MOD 30 MIN: CPT | Performed by: NURSE PRACTITIONER

## 2025-07-02 PROCEDURE — 3044F HG A1C LEVEL LT 7.0%: CPT | Performed by: NURSE PRACTITIONER

## 2025-07-02 PROCEDURE — 83036 HEMOGLOBIN GLYCOSYLATED A1C: CPT | Performed by: NURSE PRACTITIONER

## 2025-07-02 RX ORDER — LISINOPRIL 2.5 MG/1
2.5 TABLET ORAL DAILY
Qty: 90 TABLET | Refills: 1 | Status: SHIPPED | OUTPATIENT
Start: 2025-07-02

## 2025-07-02 RX ORDER — ATORVASTATIN CALCIUM 20 MG/1
TABLET, FILM COATED ORAL
Qty: 90 TABLET | Refills: 1 | Status: SHIPPED | OUTPATIENT
Start: 2025-07-02

## 2025-07-02 SDOH — ECONOMIC STABILITY: FOOD INSECURITY: WITHIN THE PAST 12 MONTHS, YOU WORRIED THAT YOUR FOOD WOULD RUN OUT BEFORE YOU GOT MONEY TO BUY MORE.: NEVER TRUE

## 2025-07-02 SDOH — ECONOMIC STABILITY: FOOD INSECURITY: WITHIN THE PAST 12 MONTHS, THE FOOD YOU BOUGHT JUST DIDN'T LAST AND YOU DIDN'T HAVE MONEY TO GET MORE.: NEVER TRUE

## 2025-07-02 ASSESSMENT — ENCOUNTER SYMPTOMS
SHORTNESS OF BREATH: 0
RHINORRHEA: 0
SORE THROAT: 0
VOMITING: 0
VISUAL CHANGE: 0
CONSTIPATION: 0
ABDOMINAL PAIN: 0
NAUSEA: 0
WHEEZING: 0
DIARRHEA: 0
COUGH: 0

## 2025-07-02 NOTE — PROGRESS NOTES
abdominal tenderness.   Musculoskeletal:      Cervical back: Normal range of motion and neck supple.      Right lower leg: No edema.      Left lower leg: No edema.      Right foot: Normal range of motion. No deformity.      Left foot: Normal range of motion. No deformity.   Feet:      Right foot:      Protective Sensation: 5 sites tested.  5 sites sensed.      Skin integrity: Skin integrity normal.      Toenail Condition: Right toenails are normal.      Left foot:      Protective Sensation: 5 sites tested.  5 sites sensed.      Skin integrity: Skin integrity normal.      Toenail Condition: Left toenails are normal.   Lymphadenopathy:      Cervical: No cervical adenopathy.   Skin:     General: Skin is warm and dry.      Findings: No rash.   Neurological:      Mental Status: She is alert and oriented to person, place, and time.   Psychiatric:         Mood and Affect: Mood normal.         Behavior: Behavior normal. Behavior is cooperative.         Data:     Lab Results   Component Value Date/Time     02/21/2024 09:58 AM    K 4.5 02/21/2024 09:58 AM     02/21/2024 09:58 AM    CO2 24 02/21/2024 09:58 AM    BUN 14 02/21/2024 09:58 AM    CREATININE 0.5 02/21/2024 09:58 AM    GLUCOSE 122 02/21/2024 09:58 AM    BILITOT <0.10 11/20/2019 08:53 AM    ALKPHOS 86 11/20/2019 08:53 AM    AST 17 07/27/2023 01:34 PM    ALT 21 07/27/2023 01:34 PM     Lab Results   Component Value Date/Time    WBC 6.6 01/24/2024 09:09 AM    RBC 4.58 01/24/2024 09:09 AM    HGB 13.9 01/24/2024 09:09 AM    HCT 43.5 01/24/2024 09:09 AM    MCV 95.0 01/24/2024 09:09 AM    MCH 30.3 01/24/2024 09:09 AM    MCHC 32.0 01/24/2024 09:09 AM    RDW 12.7 01/24/2024 09:09 AM     01/24/2024 09:09 AM    MPV 8.8 01/24/2024 09:09 AM     Lab Results   Component Value Date/Time    TSH 1.86 06/10/2021 05:07 PM     Lab Results   Component Value Date/Time    CHOL 137 07/27/2023 01:34 PM    LDL 74 07/27/2023 01:34 PM    HDL 40 07/27/2023 01:34 PM    LABA1C 6.1

## 2025-07-02 NOTE — PATIENT INSTRUCTIONS
SURVEY:     You may be receiving a survey from [facility] regarding your visit today.     Please complete the survey to enable us to provide the highest quality of care to you and your family.     If you cannot score us a very good on any question, please call the office to discuss how we could have made your experience a very good one.     Thank you,    Silvio Johnson, APRN-CNP  Lacye Zuniga, APRN-CNP  Tammi, LPN  Marycruz, CMA  Demetrius, CMA  Nicki, CMA  Adriana, PCA  Ca, CMA  Shelby, PM

## 2025-07-03 DIAGNOSIS — F32.A ANXIETY AND DEPRESSION: ICD-10-CM

## 2025-07-03 DIAGNOSIS — F41.9 ANXIETY AND DEPRESSION: ICD-10-CM

## 2025-07-03 RX ORDER — VENLAFAXINE HYDROCHLORIDE 75 MG/1
CAPSULE, EXTENDED RELEASE ORAL DAILY
Qty: 90 CAPSULE | Refills: 3 | Status: SHIPPED | OUTPATIENT
Start: 2025-07-03

## 2025-07-07 DIAGNOSIS — E55.9 VITAMIN D DEFICIENCY: ICD-10-CM

## 2025-07-07 NOTE — TELEPHONE ENCOUNTER
Health Maintenance   Topic Date Due    Diabetic Alb to Cr ratio (uACR) test  07/27/2024    Lipids  07/27/2024    Depression Monitoring  04/30/2025    Hepatitis B vaccine (1 of 3 - 19+ 3-dose series) 07/02/2026 (Originally 9/2/2006)    DTaP/Tdap/Td vaccine (2 - Tdap) 07/02/2026 (Originally 6/28/2015)    GFR test (Diabetes, CKD 3-4, OR last GFR 15-59)  07/02/2026 (Originally 2/21/2025)    Pneumococcal 0-49 years Vaccine (2 of 2 - PCV) 07/02/2026 (Originally 6/2/2018)    COVID-19 Vaccine (4 - 2024-25 season) 07/02/2026 (Originally 9/1/2024)    Cervical cancer screen  07/02/2026 (Originally 9/2/2017)    Diabetic retinal exam  07/12/2026 (Originally 2/23/2025)    Flu vaccine (1) 08/01/2025    Diabetic foot exam  07/02/2026    A1C test (Diabetic or Prediabetic)  07/02/2026    Hepatitis C screen  Completed    HIV screen  Completed    Hepatitis A vaccine  Aged Out    Hib vaccine  Aged Out    HPV vaccine  Aged Out    Polio vaccine  Aged Out    Meningococcal (ACWY) vaccine  Aged Out    Meningococcal B vaccine  Aged Out    Varicella vaccine  Discontinued             (applicable per patient's age: Cancer Screenings, Depression Screening, Fall Risk Screening, Immunizations)    Hemoglobin A1C (%)   Date Value   07/02/2025 6.8   02/21/2024 6.1 (H)   07/27/2023 7.3 (H)     AST (U/L)   Date Value   07/27/2023 17     ALT (U/L)   Date Value   07/27/2023 21     BUN (mg/dL)   Date Value   02/21/2024 14      (goal A1C is < 7)   (goal LDL is <100) need 30-50% reduction from baseline     BP Readings from Last 3 Encounters:   07/02/25 122/70   04/30/24 122/80   02/22/24 118/70    (goal /80)      All Future Testing planned in [facility]:  Lab Frequency Next Occurrence   CBC with Auto Differential Once 07/26/2024   Iron and TIBC Once 07/26/2024   CBC with Auto Differential Once 07/02/2025   Lipid Panel Once 07/02/2025   Basic Metabolic Panel Once 07/02/2025   AST Once 07/02/2025   ALT Once 07/02/2025   Albumin/Creatinine Ratio, Urine Once
